# Patient Record
Sex: FEMALE | Race: WHITE | NOT HISPANIC OR LATINO | Employment: OTHER | URBAN - NONMETROPOLITAN AREA
[De-identification: names, ages, dates, MRNs, and addresses within clinical notes are randomized per-mention and may not be internally consistent; named-entity substitution may affect disease eponyms.]

---

## 2017-11-05 ENCOUNTER — APPOINTMENT (EMERGENCY)
Dept: CT IMAGING | Facility: HOSPITAL | Age: 62
DRG: 617 | End: 2017-11-05
Payer: MEDICARE

## 2017-11-05 ENCOUNTER — HOSPITAL ENCOUNTER (INPATIENT)
Facility: HOSPITAL | Age: 62
LOS: 4 days | Discharge: HOME | DRG: 617 | End: 2017-11-09
Attending: EMERGENCY MEDICINE | Admitting: INTERNAL MEDICINE
Payer: MEDICARE

## 2017-11-05 DIAGNOSIS — E55.9 VITAMIN D DEFICIENCY: ICD-10-CM

## 2017-11-05 DIAGNOSIS — E83.39 HYPERPHOSPHATEMIA: ICD-10-CM

## 2017-11-05 DIAGNOSIS — E83.42 HYPOMAGNESEMIA: ICD-10-CM

## 2017-11-05 DIAGNOSIS — E11.9 DIABETES (HCC): ICD-10-CM

## 2017-11-05 DIAGNOSIS — E11.65 TYPE 2 DIABETES MELLITUS WITH HYPERGLYCEMIA, WITHOUT LONG-TERM CURRENT USE OF INSULIN (HCC): ICD-10-CM

## 2017-11-05 DIAGNOSIS — R74.8 ELEVATED ALKALINE PHOSPHATASE LEVEL: ICD-10-CM

## 2017-11-05 DIAGNOSIS — R59.0 INGUINAL LYMPHADENOPATHY: ICD-10-CM

## 2017-11-05 DIAGNOSIS — E83.51 HYPOCALCEMIA: ICD-10-CM

## 2017-11-05 DIAGNOSIS — E87.1 HYPONATREMIA: ICD-10-CM

## 2017-11-05 DIAGNOSIS — L97.519: ICD-10-CM

## 2017-11-05 DIAGNOSIS — L03.031 CELLULITIS OF THIRD TOE, RIGHT: ICD-10-CM

## 2017-11-05 DIAGNOSIS — M86.9 OSTEOMYELITIS (HCC): Primary | ICD-10-CM

## 2017-11-05 LAB
ALBUMIN SERPL BCP-MCNC: 3.6 G/DL (ref 3.5–5)
ALP SERPL-CCNC: 134 U/L (ref 46–116)
ALT SERPL W P-5'-P-CCNC: 23 U/L (ref 12–78)
ANION GAP SERPL CALCULATED.3IONS-SCNC: 9 MMOL/L (ref 4–13)
APTT PPP: 27 SECONDS (ref 23–35)
AST SERPL W P-5'-P-CCNC: 16 U/L (ref 5–45)
BACTERIA UR QL AUTO: ABNORMAL /HPF
BASOPHILS # BLD AUTO: 0.06 THOUSANDS/ΜL (ref 0–0.1)
BASOPHILS NFR BLD AUTO: 1 % (ref 0–1)
BILIRUB SERPL-MCNC: 0.5 MG/DL (ref 0.2–1)
BILIRUB UR QL STRIP: NEGATIVE
BUN SERPL-MCNC: 16 MG/DL (ref 5–25)
CALCIUM SERPL-MCNC: 9.5 MG/DL (ref 8.3–10.1)
CHLORIDE SERPL-SCNC: 98 MMOL/L (ref 100–108)
CLARITY UR: CLEAR
CO2 SERPL-SCNC: 28 MMOL/L (ref 21–32)
COLOR UR: YELLOW
CREAT SERPL-MCNC: 0.9 MG/DL (ref 0.6–1.3)
EOSINOPHIL # BLD AUTO: 0.09 THOUSAND/ΜL (ref 0–0.61)
EOSINOPHIL NFR BLD AUTO: 1 % (ref 0–6)
ERYTHROCYTE [DISTWIDTH] IN BLOOD BY AUTOMATED COUNT: 13.4 % (ref 11.6–15.1)
ERYTHROCYTE [SEDIMENTATION RATE] IN BLOOD: 56 MM/HOUR (ref 0–20)
GFR SERPL CREATININE-BSD FRML MDRD: 69 ML/MIN/1.73SQ M
GLUCOSE SERPL-MCNC: 292 MG/DL (ref 65–140)
GLUCOSE UR STRIP-MCNC: ABNORMAL MG/DL
HCT VFR BLD AUTO: 38.7 % (ref 34.8–46.1)
HGB BLD-MCNC: 13.2 G/DL (ref 11.5–15.4)
HGB UR QL STRIP.AUTO: ABNORMAL
INR PPP: 0.99 (ref 0.86–1.16)
KETONES UR STRIP-MCNC: ABNORMAL MG/DL
LACTATE SERPL-SCNC: 1.3 MMOL/L (ref 0.5–2)
LACTATE SERPL-SCNC: 1.3 MMOL/L (ref 0.5–2)
LEUKOCYTE ESTERASE UR QL STRIP: ABNORMAL
LYMPHOCYTES # BLD AUTO: 2.24 THOUSANDS/ΜL (ref 0.6–4.47)
LYMPHOCYTES NFR BLD AUTO: 20 % (ref 14–44)
MCH RBC QN AUTO: 28.9 PG (ref 26.8–34.3)
MCHC RBC AUTO-ENTMCNC: 34.1 G/DL (ref 31.4–37.4)
MCV RBC AUTO: 85 FL (ref 82–98)
MONOCYTES # BLD AUTO: 0.71 THOUSAND/ΜL (ref 0.17–1.22)
MONOCYTES NFR BLD AUTO: 6 % (ref 4–12)
NEUTROPHILS # BLD AUTO: 7.96 THOUSANDS/ΜL (ref 1.85–7.62)
NEUTS SEG NFR BLD AUTO: 72 % (ref 43–75)
NITRITE UR QL STRIP: NEGATIVE
NON-SQ EPI CELLS URNS QL MICRO: ABNORMAL /HPF
PH UR STRIP.AUTO: 5.5 [PH] (ref 4.5–8)
PLATELET # BLD AUTO: 295 THOUSANDS/UL (ref 149–390)
PMV BLD AUTO: 9.6 FL (ref 8.9–12.7)
POTASSIUM SERPL-SCNC: 3.8 MMOL/L (ref 3.5–5.3)
PROT SERPL-MCNC: 8.2 G/DL (ref 6.4–8.2)
PROT UR STRIP-MCNC: NEGATIVE MG/DL
PROTHROMBIN TIME: 12.9 SECONDS (ref 12.1–14.4)
RBC # BLD AUTO: 4.57 MILLION/UL (ref 3.81–5.12)
RBC #/AREA URNS AUTO: ABNORMAL /HPF
SODIUM SERPL-SCNC: 135 MMOL/L (ref 136–145)
SP GR UR STRIP.AUTO: 1.02 (ref 1–1.03)
TROPONIN I SERPL-MCNC: <0.02 NG/ML
UROBILINOGEN UR QL STRIP.AUTO: 0.2 E.U./DL
WBC # BLD AUTO: 11.06 THOUSAND/UL (ref 4.31–10.16)
WBC #/AREA URNS AUTO: ABNORMAL /HPF

## 2017-11-05 PROCEDURE — 99285 EMERGENCY DEPT VISIT HI MDM: CPT

## 2017-11-05 PROCEDURE — 80053 COMPREHEN METABOLIC PANEL: CPT | Performed by: EMERGENCY MEDICINE

## 2017-11-05 PROCEDURE — 73700 CT LOWER EXTREMITY W/O DYE: CPT

## 2017-11-05 PROCEDURE — 83605 ASSAY OF LACTIC ACID: CPT | Performed by: EMERGENCY MEDICINE

## 2017-11-05 PROCEDURE — 36415 COLL VENOUS BLD VENIPUNCTURE: CPT | Performed by: EMERGENCY MEDICINE

## 2017-11-05 PROCEDURE — 85652 RBC SED RATE AUTOMATED: CPT | Performed by: EMERGENCY MEDICINE

## 2017-11-05 PROCEDURE — 85730 THROMBOPLASTIN TIME PARTIAL: CPT | Performed by: EMERGENCY MEDICINE

## 2017-11-05 PROCEDURE — 85025 COMPLETE CBC W/AUTO DIFF WBC: CPT | Performed by: EMERGENCY MEDICINE

## 2017-11-05 PROCEDURE — 86140 C-REACTIVE PROTEIN: CPT | Performed by: EMERGENCY MEDICINE

## 2017-11-05 PROCEDURE — 85610 PROTHROMBIN TIME: CPT | Performed by: EMERGENCY MEDICINE

## 2017-11-05 PROCEDURE — 87040 BLOOD CULTURE FOR BACTERIA: CPT | Performed by: EMERGENCY MEDICINE

## 2017-11-05 PROCEDURE — 93005 ELECTROCARDIOGRAM TRACING: CPT | Performed by: EMERGENCY MEDICINE

## 2017-11-05 PROCEDURE — 84484 ASSAY OF TROPONIN QUANT: CPT | Performed by: EMERGENCY MEDICINE

## 2017-11-05 PROCEDURE — 81001 URINALYSIS AUTO W/SCOPE: CPT | Performed by: EMERGENCY MEDICINE

## 2017-11-05 PROCEDURE — 87081 CULTURE SCREEN ONLY: CPT | Performed by: INTERNAL MEDICINE

## 2017-11-05 RX ADMIN — CEFEPIME HYDROCHLORIDE 2000 MG: 2 INJECTION, SOLUTION INTRAVENOUS at 19:41

## 2017-11-05 RX ADMIN — SODIUM CHLORIDE 1000 ML: 0.9 INJECTION, SOLUTION INTRAVENOUS at 19:36

## 2017-11-05 RX ADMIN — VANCOMYCIN HYDROCHLORIDE 1750 MG: 1 INJECTION, POWDER, LYOPHILIZED, FOR SOLUTION INTRAVENOUS at 20:47

## 2017-11-05 NOTE — ED PROVIDER NOTES
History  Chief Complaint   Patient presents with    Foot Swelling     Right foot swelling for two weeks  States many objects that have fallen on her foot  The patient is a pleasant 58-year-old male that reports to the emergency department with redness and swelling to the right middle toe the past 2 weeks  She notes that she clips her toes and that she cut her toe about 2 weeks ago  She notes subjective fevers and chills  No vomiting or diarrhea  No crepitus  No lightheadedness or dizziness  No chest pain or shortness of breath  The medical decision making: This is a 58-year-old female concern for possible osteomyelitis, will workup, will perform septic workup as well  Prior to Admission Medications   Prescriptions Last Dose Informant Patient Reported? Taking? HYDROcodone-acetaminophen (VICODIN) 7 5-500 MG per tablet   Yes Yes   Sig: Take 1 tablet by mouth every 6 (six) hours as needed for moderate pain      Facility-Administered Medications: None       Past Medical History:   Diagnosis Date    Diabetes mellitus ([facility] [address] )        Past Surgical History:   Procedure Laterality Date    APPENDECTOMY      BREAST LUMPECTOMY Left     CHOLECYSTECTOMY         History reviewed  No pertinent family history  I have reviewed and agree with the history as documented  Social History   Substance Use Topics    Smoking status: Never Smoker    Smokeless tobacco: Never Used    Alcohol use No        Review of Systems   Constitutional:        Subjective fevers and chills   HENT: Negative for ear discharge and facial swelling  Respiratory: Negative for chest tightness, shortness of breath and wheezing  Cardiovascular: Negative for chest pain and palpitations  Gastrointestinal: Negative for abdominal pain, diarrhea and vomiting  Genitourinary: Negative for dysuria and hematuria  Musculoskeletal: Negative for arthralgias and neck stiffness     Skin: Positive for color change and wound  Negative for rash  Allergic/Immunologic: Negative  Neurological: Negative for tremors, seizures and syncope  Psychiatric/Behavioral: Negative for hallucinations and suicidal ideas  All other systems reviewed and are negative  Physical Exam  ED Triage Vitals [11/05/17 1731]   Temperature Pulse Respirations Blood Pressure SpO2   100 3 °F (37 9 °C) (!) 107 17 (!) 224/112 98 %      Temp Source Heart Rate Source Patient Position - Orthostatic VS BP Location FiO2 (%)   Temporal Monitor Lying Right arm --      Pain Score       5           Orthostatic Vital Signs  Vitals:    11/07/17 2342 11/07/17 2352 11/08/17 0718 11/08/17 0946   BP: (!) 180/81 (!) 172/78 (!) 191/102 160/88   Pulse: 72  (!) 114    Patient Position - Orthostatic VS: Lying  Lying Lying       Physical Exam   Constitutional: She is oriented to person, place, and time  She appears well-developed and well-nourished  HENT:   Head: Normocephalic and atraumatic  Right Ear: External ear normal    Left Ear: External ear normal    Eyes: Conjunctivae and EOM are normal    Neck: Normal range of motion  Neck supple  No JVD present  No tracheal deviation present  Cardiovascular: Normal rate, regular rhythm and normal heart sounds  Pulmonary/Chest: Effort normal  No respiratory distress  She has no wheezes  She has no rales  Abdominal: Soft  Bowel sounds are normal  There is no tenderness  There is no rebound and no guarding  Musculoskeletal: She exhibits no edema or tenderness  Neurological: She is alert and oriented to person, place, and time  Skin: Skin is warm and dry  No rash noted  There is erythema  Psychiatric: She has a normal mood and affect  Thought content normal    Nursing note and vitals reviewed        ED Medications  Medications   sodium chloride 0 9 % infusion (100 mL/hr Intravenous New Bag 11/8/17 4509)   insulin lispro (HumaLOG) 100 units/mL subcutaneous injection 2-12 Units ( Subcutaneous MAR Unhold 11/8/17 1960) insulin lispro (HumaLOG) 100 units/mL subcutaneous injection 1-6 Units ( Subcutaneous MAR Unhold 11/8/17 1359)   acetaminophen (TYLENOL) tablet 650 mg ( Oral MAR Unhold 11/8/17 1359)   oxyCODONE (ROXICODONE) IR tablet 5 mg ( Oral MAR Unhold 11/8/17 1359)   HYDROmorphone (DILAUDID) 1 mg/mL injection 0 5 mg ( Intravenous MAR Unhold 11/8/17 1359)   ondansetron (ZOFRAN) injection 4 mg ( Intravenous MAR Unhold 11/8/17 1359)   cefepime (MAXIPIME) 2,000 mg in dextrose 5 % 50 mL IVPB ( Intravenous MAR Unhold 11/8/17 1359)   vancomycin (VANCOCIN) 1,500 mg in sodium chloride 0 9 % 500 mL IVPB ( Intravenous MAR Unhold 11/8/17 1359)   insulin aspart protamine-insulin aspart (NovoLOG 70/30) 100 units/mL subcutaneous injection 15 Units ( Subcutaneous MAR Unhold 11/8/17 1359)   ergocalciferol (VITAMIN D2) capsule 50,000 Units ( Oral MAR Unhold 11/8/17 1359)   lisinopril (ZESTRIL) tablet 10 mg ( Oral MAR Unhold 11/8/17 1359)   labetalol (NORMODYNE) injection 10 mg ( Intravenous MAR Unhold 11/8/17 1359)   bupivacaine (MARCAINE) 0 5 % injection (15 mL  Given 11/8/17 1415)   sodium chloride 0 9 % bolus 1,000 mL (0 mL Intravenous Stopped 11/5/17 2036)   vancomycin (VANCOCIN) 1,750 mg in sodium chloride 0 9 % 500 mL IVPB (1,750 mg Intravenous New Bag 11/5/17 2047)   cefepime (MAXIPIME) IVPB (premix) 2,000 mg (0 mg Intravenous Stopped 11/5/17 2011)   labetalol (NORMODYNE) injection 10 mg (10 mg Intravenous Given 11/7/17 1328)       Diagnostic Studies  Results Reviewed     Procedure Component Value Units Date/Time    Blood culture #1 [[id number]] Collected:  11/05/17 1936    Lab Status:  Preliminary result Specimen:  Blood from Arm, Right Updated:  11/08/17 0801     Blood Culture No Growth at 48 hrs  Blood culture #2 [[id number]] Collected:  11/05/17 1932    Lab Status:  Preliminary result Specimen:  Blood from Hand, Right Updated:  11/08/17 0801     Blood Culture No Growth at 48 hrs      MRSA culture [[id number]]  (Normal) Collected: 11/05/17 2029    Lab Status:  Final result Specimen:  Nares from Nose Updated:  11/07/17 1452     MRSA Culture Only No Methicillin Resistant Staphlyococcus aureus (MRSA) isolated    C-reactive protein [[id number]]  (Abnormal) Collected:  11/05/17 1936    Lab Status:  Final result Specimen:  Blood from Arm, Right Updated:  11/06/17 0828     CRP 67 7 (H) mg/L     Lactic acid x2 Q2H [[id number]]  (Normal) Collected:  11/05/17 2206    Lab Status:  Final result Specimen:  Blood Updated:  11/05/17 2222     LACTIC ACID 1 3 mmol/L     Narrative:         Result may be elevated if tourniquet was used during collection  Sedimentation rate, automated [[id number]]  (Abnormal) Collected:  11/05/17 1936    Lab Status:  Final result Specimen:  Blood from Arm, Right Updated:  11/05/17 2054     Sed Rate 56 (H) mm/hour     Protime-INR [[id number]]  (Normal) Collected:  11/05/17 1936    Lab Status:  Final result Specimen:  Blood from Arm, Right Updated:  11/05/17 2021     Protime 12 9 seconds      INR 0 99    APTT [[id number]]  (Normal) Collected:  11/05/17 1936    Lab Status:  Final result Specimen:  Blood from Arm, Right Updated:  11/05/17 2021     PTT 27 seconds     Narrative: Therapeutic Heparin Range = 60-90 seconds    Troponin I [[id number]]  (Normal) Collected:  11/05/17 1936    Lab Status:  Final result Specimen:  Blood from Arm, Right Updated:  11/05/17 2010     Troponin I <0 02 ng/mL     Narrative:         [employer] Chemistry analyzer 99% cutoff is > 0 04 ng/mL in network labs    o cTnI 99% cutoff is useful only when applied to patients in the clinical setting of myocardial ischemia  o cTnI 99% cutoff should be interpreted in the context of clinical history, ECG findings and possibly cardiac imaging to establish correct diagnosis  o cTnI 99% cutoff may be suggestive but clearly not indicative of a coronary event without the clinical setting of myocardial ischemia      Lactic acid x2 Q2H [[id number]]  (Normal) Collected:  11/05/17 1936    Lab Status:  Final result Specimen:  Blood from Arm, Right Updated:  11/05/17 2010     LACTIC ACID 1 3 mmol/L     Narrative:         Result may be elevated if tourniquet was used during collection  Comprehensive metabolic panel [[id number]]  (Abnormal) Collected:  11/05/17 1936    Lab Status:  Final result Specimen:  Blood from Arm, Right Updated:  11/05/17 2007     Sodium 135 (L) mmol/L      Potassium 3 8 mmol/L      Chloride 98 (L) mmol/L      CO2 28 mmol/L      Anion Gap 9 mmol/L      BUN 16 mg/dL      Creatinine 0 90 mg/dL      Glucose 292 (H) mg/dL      Calcium 9 5 mg/dL      AST 16 U/L      ALT 23 U/L      Alkaline Phosphatase 134 (H) U/L      Total Protein 8 2 g/dL      Albumin 3 6 g/dL      Total Bilirubin 0 50 mg/dL      eGFR 69 ml/min/1 73sq m     Narrative:         National Kidney Disease Education Program recommendations are as follows:  GFR calculation is accurate only with a steady state creatinine  Chronic Kidney disease less than 60 ml/min/1 73 sq  meters  Kidney failure less than 15 ml/min/1 73 sq  meters  Urine Microscopic [[id number]]  (Abnormal) Collected:  11/05/17 1942    Lab Status:  Final result Specimen:  Urine from Urine, Clean Catch Updated:  11/05/17 1959     RBC, UA 0-1 (A) /hpf      WBC, UA 0-1 (A) /hpf      Epithelial Cells Occasional /hpf      Bacteria, UA None Seen /hpf     UA w Reflex to Microscopic w Reflex to Culture [[id number]]  (Abnormal) Collected:  11/05/17 1942    Lab Status:  Final result Specimen:  Urine from Urine, Clean Catch Updated:  11/05/17 1951     Color, UA Yellow     Clarity, UA Clear     Specific [place], UA 1 020     pH, UA 5 5     Leukocytes, UA Elevated glucose may cause decreased leukocyte values   See urine microscopic for [facility] result/ (A)     Nitrite, UA Negative     Protein, UA Negative mg/dl      Glucose, UA >=1000 (1%) (A) mg/dl      Ketones, UA Trace (A) mg/dl      Urobilinogen, UA 0 2 E U /dl      Bilirubin, UA Negative     Blood, UA Trace-lysed (A)    CBC and differential [[id number]]  (Abnormal) Collected:  11/05/17 1936    Lab Status:  Final result Specimen:  Blood from Arm, Right Updated:  11/05/17 1950     WBC 11 06 (H) Thousand/uL      RBC 4 57 Million/uL      Hemoglobin 13 2 g/dL      Hematocrit 38 7 %      MCV 85 fL      MCH 28 9 pg      MCHC 34 1 g/dL      RDW 13 4 %      MPV 9 6 fL      Platelets 810 Thousands/uL      Neutrophils Relative 72 %      Lymphocytes Relative 20 %      Monocytes Relative 6 %      Eosinophils Relative 1 %      Basophils Relative 1 %      Neutrophils Absolute 7 96 (H) Thousands/µL      Lymphocytes Absolute 2 24 Thousands/µL      Monocytes Absolute 0 71 Thousand/µL      Eosinophils Absolute 0 09 Thousand/µL      Basophils Absolute 0 06 Thousands/µL                  NM radrx ceretec abscess localization limited   Final Result by Lloyd Valencia MD (11/08 1843)   1  Findings suspicious for infection and underlying osteomyelitis of the 3rd toe distal phalanx  ##sigslh##sigslh         Workstation performed: [id number]         VAS lower limb venous duplex study, complete bilateral   Final Result by Jackson Martinez DO (11/07 1849)      CT foot right wo contrast   Final Result by Vikram Garcia MD (11/05 1028)      There is an ulcer of the very distal aspect of the 3rd toe which approaches but does not appear to contact the distal aspect of the 3rd distal phalanx  Mild irregularity of the underlying 3rd distal phalanx may indicate osteomyelitis  Consider MRI    follow-up           ##imslh##imslh         Workstation performed: [id number]                    Procedures  Procedures       Phone Contacts  ED Phone Contact    ED Course  ED Course as of Nov 08 1434   Sun Nov 05, 2017   1901 SO- rule out osteo, admit to slim with abx, pending infectious workup                                MDM  CritCare Time    Disposition  Final diagnoses:   Osteomyelitis ([facility] [address] )   Diabetes ([facility] [address] )     Time reflects when diagnosis was documented in both MDM as applicable and the Disposition within this note     Time User Action Codes Description Comment    11/5/2017  7:30 PM Brandon Farr Add [M86 9] Osteomyelitis ([facility] )     11/5/2017  7:30 PM Brandon Farr Add [E11 9] Diabetes ([place] [address] )     11/6/2017  1:55 AM Holger Rodgers Add [L51 165] Cellulitis of third toe, right     11/6/2017  1:55 AM Holger Rodgers Add [L97 519] Skin ulcer of third toe of right foot ([place] [address] )     11/7/2017 10:14 AM Ursula Padilla Add [E11 65] Type 2 diabetes mellitus with hyperglycemia, without long-term current use of insulin [facility])       ED Disposition     ED Disposition Condition Comment    Admit  Case was discussed with Dr Akanksha Gallardo    None       Current Discharge Medication List      CONTINUE these medications which have NOT CHANGED    Details   HYDROcodone-acetaminophen (VICODIN) 7 5-500 MG per tablet Take 1 tablet by mouth every 6 (six) hours as needed for moderate pain           No discharge procedures on file      ED Provider  Electronically Signed by           Lux Ramsey MD  11/08/17 1718

## 2017-11-06 ENCOUNTER — APPOINTMENT (INPATIENT)
Dept: MRI IMAGING | Facility: HOSPITAL | Age: 62
DRG: 617 | End: 2017-11-06
Payer: MEDICARE

## 2017-11-06 PROBLEM — R74.8 ELEVATED ALKALINE PHOSPHATASE LEVEL: Status: ACTIVE | Noted: 2017-11-06

## 2017-11-06 PROBLEM — L97.519: Status: ACTIVE | Noted: 2017-11-06

## 2017-11-06 PROBLEM — E87.1 HYPONATREMIA: Status: ACTIVE | Noted: 2017-11-06

## 2017-11-06 PROBLEM — R70.0 ELEVATED SED RATE: Status: ACTIVE | Noted: 2017-11-06

## 2017-11-06 PROBLEM — E11.65 TYPE 2 DIABETES MELLITUS WITH HYPERGLYCEMIA (HCC): Status: ACTIVE | Noted: 2017-11-06

## 2017-11-06 PROBLEM — L03.031 CELLULITIS OF THIRD TOE, RIGHT: Status: ACTIVE | Noted: 2017-11-06

## 2017-11-06 LAB
25(OH)D3 SERPL-MCNC: 9.7 NG/ML (ref 30–100)
ALBUMIN SERPL BCP-MCNC: 3.1 G/DL (ref 3.5–5)
ALP SERPL-CCNC: 119 U/L (ref 46–116)
ALT SERPL W P-5'-P-CCNC: 19 U/L (ref 12–78)
ANION GAP SERPL CALCULATED.3IONS-SCNC: 7 MMOL/L (ref 4–13)
AST SERPL W P-5'-P-CCNC: 9 U/L (ref 5–45)
ATRIAL RATE: 91 BPM
BASOPHILS # BLD AUTO: 0.06 THOUSANDS/ΜL (ref 0–0.1)
BASOPHILS NFR BLD AUTO: 1 % (ref 0–1)
BILIRUB SERPL-MCNC: 0.7 MG/DL (ref 0.2–1)
BUN SERPL-MCNC: 11 MG/DL (ref 5–25)
CALCIUM SERPL-MCNC: 8.5 MG/DL (ref 8.3–10.1)
CHLORIDE SERPL-SCNC: 100 MMOL/L (ref 100–108)
CK SERPL-CCNC: 63 U/L (ref 26–192)
CO2 SERPL-SCNC: 29 MMOL/L (ref 21–32)
CREAT SERPL-MCNC: 0.73 MG/DL (ref 0.6–1.3)
CRP SERPL QL: 67.7 MG/L
EOSINOPHIL # BLD AUTO: 0.12 THOUSAND/ΜL (ref 0–0.61)
EOSINOPHIL NFR BLD AUTO: 1 % (ref 0–6)
ERYTHROCYTE [DISTWIDTH] IN BLOOD BY AUTOMATED COUNT: 13.4 % (ref 11.6–15.1)
EST. AVERAGE GLUCOSE BLD GHB EST-MCNC: 298 MG/DL
GFR SERPL CREATININE-BSD FRML MDRD: 89 ML/MIN/1.73SQ M
GLUCOSE SERPL-MCNC: 177 MG/DL (ref 65–140)
GLUCOSE SERPL-MCNC: 232 MG/DL (ref 65–140)
GLUCOSE SERPL-MCNC: 241 MG/DL (ref 65–140)
GLUCOSE SERPL-MCNC: 245 MG/DL (ref 65–140)
GLUCOSE SERPL-MCNC: 248 MG/DL (ref 65–140)
GLUCOSE SERPL-MCNC: 318 MG/DL (ref 65–140)
HBA1C MFR BLD: 12 % (ref 4.2–6.3)
HCT VFR BLD AUTO: 34.9 % (ref 34.8–46.1)
HGB BLD-MCNC: 12 G/DL (ref 11.5–15.4)
LACTATE SERPL-SCNC: 1 MMOL/L (ref 0.5–2)
LYMPHOCYTES # BLD AUTO: 2.2 THOUSANDS/ΜL (ref 0.6–4.47)
LYMPHOCYTES NFR BLD AUTO: 25 % (ref 14–44)
MAGNESIUM SERPL-MCNC: 1.6 MG/DL (ref 1.6–2.6)
MCH RBC QN AUTO: 29.2 PG (ref 26.8–34.3)
MCHC RBC AUTO-ENTMCNC: 34.4 G/DL (ref 31.4–37.4)
MCV RBC AUTO: 85 FL (ref 82–98)
MONOCYTES # BLD AUTO: 0.49 THOUSAND/ΜL (ref 0.17–1.22)
MONOCYTES NFR BLD AUTO: 6 % (ref 4–12)
NEUTROPHILS # BLD AUTO: 5.97 THOUSANDS/ΜL (ref 1.85–7.62)
NEUTS SEG NFR BLD AUTO: 67 % (ref 43–75)
P AXIS: 7 DEGREES
PHOSPHATE SERPL-MCNC: 3.4 MG/DL (ref 2.3–4.1)
PLATELET # BLD AUTO: 269 THOUSANDS/UL (ref 149–390)
PMV BLD AUTO: 9.3 FL (ref 8.9–12.7)
POTASSIUM SERPL-SCNC: 3.6 MMOL/L (ref 3.5–5.3)
PR INTERVAL: 168 MS
PROT SERPL-MCNC: 7.4 G/DL (ref 6.4–8.2)
QRS AXIS: 2 DEGREES
QRSD INTERVAL: 90 MS
QT INTERVAL: 372 MS
QTC INTERVAL: 457 MS
RBC # BLD AUTO: 4.11 MILLION/UL (ref 3.81–5.12)
SODIUM SERPL-SCNC: 136 MMOL/L (ref 136–145)
T WAVE AXIS: 33 DEGREES
TROPONIN I SERPL-MCNC: <0.02 NG/ML
TROPONIN I SERPL-MCNC: <0.02 NG/ML
TSH SERPL DL<=0.05 MIU/L-ACNC: 1.15 UIU/ML (ref 0.36–3.74)
VENTRICULAR RATE: 91 BPM
WBC # BLD AUTO: 8.84 THOUSAND/UL (ref 4.31–10.16)

## 2017-11-06 PROCEDURE — 80053 COMPREHEN METABOLIC PANEL: CPT | Performed by: INTERNAL MEDICINE

## 2017-11-06 PROCEDURE — 83735 ASSAY OF MAGNESIUM: CPT | Performed by: INTERNAL MEDICINE

## 2017-11-06 PROCEDURE — 87205 SMEAR GRAM STAIN: CPT | Performed by: PODIATRIST

## 2017-11-06 PROCEDURE — 82948 REAGENT STRIP/BLOOD GLUCOSE: CPT

## 2017-11-06 PROCEDURE — 84443 ASSAY THYROID STIM HORMONE: CPT | Performed by: INTERNAL MEDICINE

## 2017-11-06 PROCEDURE — 85025 COMPLETE CBC W/AUTO DIFF WBC: CPT | Performed by: INTERNAL MEDICINE

## 2017-11-06 PROCEDURE — 83036 HEMOGLOBIN GLYCOSYLATED A1C: CPT | Performed by: INTERNAL MEDICINE

## 2017-11-06 PROCEDURE — G8979 MOBILITY GOAL STATUS: HCPCS | Performed by: PHYSICAL THERAPIST

## 2017-11-06 PROCEDURE — G8978 MOBILITY CURRENT STATUS: HCPCS | Performed by: PHYSICAL THERAPIST

## 2017-11-06 PROCEDURE — 87070 CULTURE OTHR SPECIMN AEROBIC: CPT | Performed by: PODIATRIST

## 2017-11-06 PROCEDURE — 87147 CULTURE TYPE IMMUNOLOGIC: CPT | Performed by: PODIATRIST

## 2017-11-06 PROCEDURE — 82306 VITAMIN D 25 HYDROXY: CPT | Performed by: INTERNAL MEDICINE

## 2017-11-06 PROCEDURE — 87086 URINE CULTURE/COLONY COUNT: CPT | Performed by: INTERNAL MEDICINE

## 2017-11-06 PROCEDURE — G8980 MOBILITY D/C STATUS: HCPCS | Performed by: PHYSICAL THERAPIST

## 2017-11-06 PROCEDURE — 97163 PT EVAL HIGH COMPLEX 45 MIN: CPT | Performed by: PHYSICAL THERAPIST

## 2017-11-06 PROCEDURE — 84100 ASSAY OF PHOSPHORUS: CPT | Performed by: INTERNAL MEDICINE

## 2017-11-06 PROCEDURE — 83605 ASSAY OF LACTIC ACID: CPT | Performed by: INTERNAL MEDICINE

## 2017-11-06 PROCEDURE — 82550 ASSAY OF CK (CPK): CPT | Performed by: INTERNAL MEDICINE

## 2017-11-06 PROCEDURE — 84484 ASSAY OF TROPONIN QUANT: CPT | Performed by: INTERNAL MEDICINE

## 2017-11-06 RX ORDER — ACETAMINOPHEN 325 MG/1
650 TABLET ORAL EVERY 6 HOURS PRN
Status: DISCONTINUED | OUTPATIENT
Start: 2017-11-06 | End: 2017-11-09 | Stop reason: HOSPADM

## 2017-11-06 RX ORDER — SODIUM CHLORIDE 9 MG/ML
100 INJECTION, SOLUTION INTRAVENOUS CONTINUOUS
Status: DISCONTINUED | OUTPATIENT
Start: 2017-11-06 | End: 2017-11-09

## 2017-11-06 RX ORDER — OXYCODONE HYDROCHLORIDE 5 MG/1
5 TABLET ORAL EVERY 4 HOURS PRN
Status: DISCONTINUED | OUTPATIENT
Start: 2017-11-06 | End: 2017-11-09 | Stop reason: HOSPADM

## 2017-11-06 RX ORDER — ONDANSETRON 2 MG/ML
4 INJECTION INTRAMUSCULAR; INTRAVENOUS EVERY 6 HOURS PRN
Status: DISCONTINUED | OUTPATIENT
Start: 2017-11-06 | End: 2017-11-09 | Stop reason: HOSPADM

## 2017-11-06 RX ORDER — CEFEPIME HYDROCHLORIDE 2 G/1
2000 INJECTION, POWDER, FOR SOLUTION INTRAVENOUS EVERY 12 HOURS
Status: DISCONTINUED | OUTPATIENT
Start: 2017-11-06 | End: 2017-11-06

## 2017-11-06 RX ADMIN — INSULIN LISPRO 3 UNITS: 100 INJECTION, SOLUTION INTRAVENOUS; SUBCUTANEOUS at 23:06

## 2017-11-06 RX ADMIN — VANCOMYCIN HYDROCHLORIDE 1500 MG: 5 INJECTION, POWDER, LYOPHILIZED, FOR SOLUTION INTRAVENOUS at 23:06

## 2017-11-06 RX ADMIN — ENOXAPARIN SODIUM 40 MG: 40 INJECTION SUBCUTANEOUS at 03:02

## 2017-11-06 RX ADMIN — SODIUM CHLORIDE 100 ML/HR: 0.9 INJECTION, SOLUTION INTRAVENOUS at 17:42

## 2017-11-06 RX ADMIN — CEFEPIME 2000 MG: 2 INJECTION, POWDER, FOR SOLUTION INTRAMUSCULAR; INTRAVENOUS at 09:52

## 2017-11-06 RX ADMIN — CEFEPIME 2000 MG: 2 INJECTION, POWDER, FOR SOLUTION INTRAMUSCULAR; INTRAVENOUS at 19:41

## 2017-11-06 RX ADMIN — SODIUM CHLORIDE 100 ML/HR: 0.9 INJECTION, SOLUTION INTRAVENOUS at 03:03

## 2017-11-06 RX ADMIN — INSULIN LISPRO 8 UNITS: 100 INJECTION, SOLUTION INTRAVENOUS; SUBCUTANEOUS at 09:20

## 2017-11-06 RX ADMIN — INSULIN LISPRO 4 UNITS: 100 INJECTION, SOLUTION INTRAVENOUS; SUBCUTANEOUS at 11:44

## 2017-11-06 RX ADMIN — INSULIN LISPRO 2 UNITS: 100 INJECTION, SOLUTION INTRAVENOUS; SUBCUTANEOUS at 16:50

## 2017-11-06 RX ADMIN — VANCOMYCIN HYDROCHLORIDE 1500 MG: 5 INJECTION, POWDER, LYOPHILIZED, FOR SOLUTION INTRAVENOUS at 11:47

## 2017-11-06 NOTE — PHYSICAL THERAPY NOTE
PT Evaluation     11/06/17 0951   Note Type   Note type Eval only   Pain Assessment   Pain Assessment No/denies pain   Pain Score No Pain   Home Living   Type of Home House   Home Layout Multi-level;Bed/bath upstairs;1/2 bath on main level; Able to live on main level with bedroom/bathroom;Stairs to enter without rails  (sleeps on couch, 2 BRANDEE no HR, 12-13 steps to 2nd w/ HR)   Home Equipment Cane   Prior Function   Level of [place] Independent with ADLs and functional mobility   Lives With Alone   ADL Assistance Independent   IADLs Independent   Comments family or friends drive   Restrictions/Precautions   Other Precautions Contact/isolation   General   Family/Caregiver Present No   Cognition   Arousal/Participation Alert   Orientation Level Oriented X4   Following Commands Follows all commands and directions without difficulty   RLE Assessment   RLE Assessment WFL  (5/5)   LLE Assessment   LLE Assessment WFL  (5/5)   Coordination   Sensation WFL   Light Touch   RLE Light Touch Grossly intact   LLE Light Touch Grossly intact   Bed Mobility   Supine to Sit 7  Independent   Sit to Supine 7  Independent   Transfers   Sit to Stand 7  Independent   Stand to Sit 7  Independent   Stand pivot 7  Independent   Additional Comments normal gait pattern, no LOB or instability   Ambulation/Elevation   Gait pattern WNL   Gait Assistance 7  Independent   Assistive Device None   Distance 50ft no A D  (I)   Balance   Static Sitting Good   Dynamic Sitting Good   Static Standing Good   Dynamic Standing Good   Ambulatory Good   Endurance Deficit   Endurance Deficit No   Activity Tolerance   Activity Tolerance Patient tolerated treatment well   Assessment   Prognosis Good   Assessment Pt is a 58year old female presenting at an Independent level  Pt is independently ambulating in room ad windy  Pt with no functional deficits and is not currently in need of skilled PT  Will d/c PT     Goals   Patient Goals To resolve foot ulcer/pain and return home   Plan   Treatment/Interventions Functional transfer training; Endurance training;Patient/family training;Bed mobility;Gait training   PT Frequency One time visit   Recommendation   Recommendation Home independently   PT - OK to Discharge Yes   refused SCD's  Pt supine in bed with call bell in reach

## 2017-11-06 NOTE — PROGRESS NOTES
Patient went down for MRI of the foot this am  Patient refused medication for claustrophobia & refused  MRI once patient arrived for test  Dr Leigh aware  Patient blood glucose elevated; patient refused medication for night shift Rn; Patient educated on the benefit of insulin r/t her disease process; patient did agree to take medication

## 2017-11-06 NOTE — CONSULTS
Consultation - [id number] [facility] 58 y o  female MRN: [id number]  Unit/Bed#: 409-01 Encounter: [id number]    Assessment/Plan     Assessment:  1  Diabetic ulcer right 3rd toe, full thickness, with surrounding cellulitis  2  Eval for osteomyelitis to distal phalanx RT 3rd toe  Plan:  Continue IV antibiotics, took wound culture today but minimal drainage for a good culture  CT scan inconclusive, and patient could not tolerate MRI  Ceretec bone scan to eval for osteomyelitis of distal phalanx of right 3rd toe  If positive, will need distal phalangectomy for surgical cure prior to discharge  Will follow bone scan results  History of Present Illness   HPI:  Mo Caraballo is a 58 y o  female who presents with a wound located at right 3rd toe  The wound has been present for over  1week(s)  The wound is secondary to patient tearing a callous off the toe over a week ago  Present to ER yesterday due to increasing swelling to right  foot and erythema to right 3rd toe  Inpatient consult to Podiatry  Consult performed by: Rolando Wilcox ordered by: Spike Pain          Review of Systems    Historical Information   Past Medical History:   Diagnosis Date    Diabetes mellitus ([facility] [address] )      Past Surgical History:   Procedure Laterality Date    APPENDECTOMY      BREAST LUMPECTOMY Left     CHOLECYSTECTOMY       Social History   History   Alcohol Use No     History   Drug Use No     History   Smoking Status    Never Smoker   Smokeless Tobacco    Never Used     Family History: non-contributory    Meds/Allergies   all current active meds have been reviewed  Allergies   Allergen Reactions    Penicillin Potassium-G [Penicillin G]     Sulfa Antibiotics        Objective   Vitals: Blood pressure (!) 180/88, pulse 88, temperature 97 9 °F (36 6 °C), temperature source Temporal, resp  rate 16, height 5' 8" (1 727 m), weight 116 kg (256 lb 9 9 oz), SpO2 95 %       Wounds:     Other Ulcers 11/05/17 Toe (Comment which one) Right red with yellow edges  (Active)   Wound Description Intact;Edema;Fragile 11/6/2017  1:00 AM   Laurie-wound Assessment Fragile;Erythema 11/6/2017  1:00 AM   Shape round 11/6/2017  1:00 AM   Drainage Amount None 11/6/2017  1:00 AM   Treatment Elevated with pillow(s); Offload 11/6/2017  1:00 AM   Dressing Status Open to air 11/6/2017  1:00 AM         Physical Exam     Derm:  Dry eschar to tip of right 3rd toe without active drainage  Erythema to to, no gangrenous changes noted  Vascular:  Pedal pulses palpable bilaterally  Neuro:  Protective sensation absent to toes bilaterally  Ortho:  Hammertoe deformity right 3rd toe contributing to pressure on tip of digit    Lab Results: I have personally reviewed pertinent reports  Imaging: I have personally reviewed pertinent reports  EKG, Pathology, and Other Studies: I have personally reviewed pertinent reports  Code Status: Level 1 - Full Code  Advance Directive and Living Will:      Power of :    POLST:      Counseling / Coordination of Care  Total floor / unit time spent today 45 minutes  Greater than 50% of total time was spent with the patient and / or family counseling and / or coordination of care  A description of the counseling / coordination of care: Need for close Podiatric follow-up post-op

## 2017-11-06 NOTE — SOCIAL WORK
Cm met with the patient to evaluate the patients prior function and living situation and any barriers to d/c and form a safe d/c plan  Cm also evaluated the patient for any services in the home or needs for services  Pt resides at home alone in a [place] in [facility] [place]  Has 2 BRANDEE then she has been staying on the 1st floor (has a bathroom on her 1st floor)  No services or DME  PCP is Cholo Zheng and pharmacy is [facility] in [facility] [place]  Pt doesn't drive and her family drives her when she has appointments  Pt plans home on dc with outpatient follow up  Cm will continue to follow and assist in dc planning

## 2017-11-06 NOTE — PROGRESS NOTES
Accucheck-241 & patient refusing insulin even after explanation  Dr Janeth Salinas made aware , along with SCD refusal & claustrophobia with MRIs

## 2017-11-06 NOTE — CASE MANAGEMENT
Initial Clinical Review    Admission: Date/Time/Statement: 11/5/17 @ 1931     Orders Placed This Encounter   Procedures    Inpatient Admission (expected length of stay for this patient is greater than two midnights)     Standing Status:   Standing     Number of Occurrences:   1     Order Specific Question:   Admitting Physician     Answer:   Simran Bey     Order Specific Question:   Level of Care     Answer:   Med Surg [16]     Order Specific Question:   Estimated length of stay     Answer:   More than 2 Midnights     Order Specific Question:   Certification     Answer:   I certify that inpatient services are medically necessary for this patient for a duration of greater than two midnights  See H&P and MD Progress Notes for additional information about the patient's course of treatment  ED: Date/Time/Mode of Arrival:   ED Arrival Information     Expected Arrival Acuity Means of Arrival Escorted By Service Admission Type    - 11/5/2017 17:23 Urgent Walk-In Self General Medicine Urgent    Arrival Complaint    foot swelling          Chief Complaint:   Chief Complaint   Patient presents with    Foot Swelling     Right foot swelling for two weeks  States many objects that have fallen on her foot  History of Illness: The patient is 58year-old female who presented to the emergency room with the complaints of an infection of her right 3rd toe  Approximately 1 week ago, the patient experienced minor trauma to the right 3rd toe  She then removed dead skin from the right 3rd toe  She developed an ulceration of the right 3rd toe  She also developed pain and swelling involving her right 3rd toe  Over the last 24-48 hours, she has been experiencing subjective fevers and chills  No chest pain  No shortness of breath  No abdominal pain  No nausea or vomiting  Nothing seemed to relieve her symptoms  Her right 3rd toe pain worsened with weight-bearing       ED Vital Signs:   ED Triage Vitals [11/05/17 1731]   Temperature Pulse Respirations Blood Pressure SpO2   100 3 °F (37 9 °C) (!) 107 17 (!) 224/112 98 %      Temp Source Heart Rate Source Patient Position - Orthostatic VS BP Location FiO2 (%)   Temporal Monitor Lying Right arm --      Pain Score       5        Wt Readings from Last 1 Encounters:   11/06/17 116 kg (256 lb 9 9 oz)       Vital Signs (abnormal):   Date and Time Temp Pulse SpO2 Resp BP Pain Score FACES Pain Rating User   11/05/17 2045 -- 89 97 % --  176/84 -- -- AF   11/05/17 1959 99 1 °F (37 3 °C) -- -- -- -- -- -- AF   11/05/17 1930 -- 91 100 % -- 167/83 -- -- AF   11/05/17 1815 -- 97 98 % --  189/82          Abnormal Labs/Diagnostic Test Results: CRP 67 7, Sed Rate 56, Na 135, Cl 98, Glucose 292, Alk Phos 134, WBC 11 06, Neutrophils Absolute 7 96    CT Right Foot: There is an ulcer of the very distal aspect of the 3rd toe which approaches but does not appear to contact the distal aspect of the 3rd distal phalanx   Mild irregularity of the underlying 3rd distal phalanx may indicate osteomyelitis       ED Treatment:   Medication Administration from 11/05/2017 1723 to 11/05/2017 2058       Date/Time Order Dose Route Action Action by Comments     11/05/2017 2036 sodium chloride 0 9 % bolus 1,000 mL 0 mL Intravenous Stopped Carola Goodman RN      11/05/2017 1936 sodium chloride 0 9 % bolus 1,000 mL 1,000 mL Intravenous [address] [facility] Mary Biba, [address] [place] [address]      11/05/2017 2047 vancomycin (VANCOCIN) 1,750 mg in sodium chloride 0 9 % 500 mL IVPB 1,750 mg Intravenous [facility] Mary Biba, [address] [place] [address]      11/05/2017 2011 cefepime (MAXIPIME) IVPB (premix) 2,000 mg 0 mg Intravenous Stopped Carola Goodman RN      11/05/2017 1941 cefepime (MAXIPIME) IVPB (premix) 2,000 mg 2,000 mg Intravenous [address] , RN         Physical Exam   General:  NAD, awake, alert, oriented x 3  CV:  + S1, +S2, Tachycardic at times, Regular rhythm  Pulm:  Lung fields are CTA bilaterally  Derm:  Dry eschar to tip of right 3rd toe without active drainage  Erythema to to, no gangrenous changes noted  Vascular:  Pedal pulses palpable bilaterally  Neuro:  Protective sensation absent to toes bilaterally  Ortho:  Hammertoe deformity right 3rd toe contributing to pressure on tip of digit     Past Medical/Surgical History: Active Ambulatory Problems     Diagnosis Date Noted    No Active Ambulatory Problems     Resolved Ambulatory Problems     Diagnosis Date Noted    No Resolved Ambulatory Problems     Past Medical History:   Diagnosis Date    Diabetes mellitus ([facility] )        Admitting Diagnosis: Diabetes ([place] [address] ) [E11 9]  Osteomyelitis ([facility] ) [M86 9]  Foot swelling [M79 89]    Age/Sex: 58 y o  female    Assessment/Plan:     Assessment:      Patient Active Problem List   Diagnosis    Hyponatremia    Elevated alkaline phosphatase level    Cellulitis of third toe, right    Skin ulcer of third toe of right foot ([facility] )    Type 2 diabetes mellitus with hyperglycemia (HCC)    Elevated sed rate         Plan:  1)  Cellulitis of the right third toe/Skin ulcer of the third toe of the right foot/Possible osteomyelitis/Elevated sed rate:  Admit the patient to inpatient status, med/surg level of care with telemetry monitoring  The patient will require at least a 2-midnight inpatient hospitalization for work-up and treatment of the symptomatology  Check blood cultures x 2 sets  Check an MRI with contrast of the right foot and right toes  Check a CRP level  Consult Dr Howie José of Podiatry  Normal saline IV fluids  Check a MRSA nasal screen  The patient will be initiated on broad-spectrum antibiotics with IV vancomycin and IV cefepime  The IV antibiotics can be narrowed depending on the MRI results and depending on the culture results     2)  Hyponatremia: This is likely a component of pseudohyponatremia in setting of hyperglycemia  Normal saline IV fluids    Follow the patient's sodium level      3)  Type 2 diabetes mellitus with hyperglycemia:  Check hemoglobin A1c  Follow patient's blood glucose results     4)  DVT Prophylaxis:  Lovenox 40 mg SQ every 24 hours    SCD's bilaterally                  Admission Orders:  Inpatient/Tele  Continuous Cardiac Monitoring  Serial Cardiac Enzymes q6h x 3  Consult Podiatry r/e right foot cellulitis (third toe)   Bilateral Sequential Compression Device  OT/PT Consult  MRI of Right Foot  SSI  NSS @ 100  Scheduled Meds:   cefepime 2,000 mg Intravenous Q12H   enoxaparin 40 mg Subcutaneous Q24H   insulin lispro 1-6 Units Subcutaneous HS   insulin lispro 2-12 Units Subcutaneous TID AC   vancomycin 17 5 mg/kg (Adjusted) Intravenous Q12H

## 2017-11-06 NOTE — PLAN OF CARE
Problem: PHYSICAL THERAPY ADULT  Goal: Performs mobility at highest level of function for planned discharge setting  See evaluation for individualized goals  Outcome: Completed Date Met: 11/06/17  Prognosis: Good     Assessment: Pt is a 58year old female presenting at an Independent level  Pt is independently ambulating in room ad windy  Pt with no functional deficits and is not currently in need of skilled PT  Will d/c PT  Recommendation: Home independently     PT - OK to Discharge: Yes    See flowsheet documentation for full assessment

## 2017-11-06 NOTE — H&P
H&P Exam - Lindsay Andino 58 y o  female MRN: [id number]    Unit/Bed#: 409-01 Encounter: [id number]    Assessment:  Patient Active Problem List   Diagnosis    Hyponatremia    Elevated alkaline phosphatase level    Cellulitis of third toe, right    Skin ulcer of third toe of right foot (HCC)    Type 2 diabetes mellitus with hyperglycemia (HCC)    Elevated sed rate       Plan:  1)  Cellulitis of the right third toe/Skin ulcer of the third toe of the right foot/Possible osteomyelitis/Elevated sed rate:  Admit the patient to inpatient status, med/surg level of care with telemetry monitoring  The patient will require at least a 2-midnight inpatient hospitalization for work-up and treatment of the symptomatology  Check blood cultures x 2 sets  Check an MRI with contrast of the right foot and right toes  Check a CRP level  Consult Dr Sapphire Doyle of Podiatry  Normal saline IV fluids  Check a MRSA nasal screen  The patient will be initiated on broad-spectrum antibiotics with IV vancomycin and IV cefepime  The IV antibiotics can be narrowed depending on the MRI results and depending on the culture results  2)  Hyponatremia: This is likely a component of pseudohyponatremia in setting of hyperglycemia  Normal saline IV fluids  Follow the patient's sodium level  3)  Type 2 diabetes mellitus with hyperglycemia:  Check hemoglobin A1c  Follow patient's blood glucose results  4)  DVT Prophylaxis:  Lovenox 40 mg SQ every 24 hours  SCD's bilaterally  History of Present Illness   The patient is 58year-old female who presented to the emergency room with the complaints of an infection of her right 3rd toe  Approximately 1 week ago, the patient experienced minor trauma to the right 3rd toe  She then removed dead skin from the right 3rd toe  She developed an ulceration of the right 3rd toe  She also developed pain and swelling involving her right 3rd toe    Over the last 24-48 hours, she has been experiencing subjective fevers and chills  No chest pain  No shortness of breath  No abdominal pain  No nausea or vomiting  Nothing seemed to relieve her symptoms  Her right 3rd toe pain worsened with weight-bearing  Review of Systems:  Per HPI, all other systems have been reviewed and were negative      Historical Information   Past Medical History:   Diagnosis Date    Diabetes mellitus ([facility] [address] )      Past Surgical History:   Procedure Laterality Date    APPENDECTOMY      BREAST LUMPECTOMY Left     CHOLECYSTECTOMY       Social History   History   Alcohol Use No     History   Drug Use No     History   Smoking Status    Never Smoker   Smokeless Tobacco    Never Used     Family History: non-contributory    Meds/Allergies   all medications and allergies reviewed  Allergies   Allergen Reactions    Penicillin Potassium-G [Penicillin G]     Sulfa Antibiotics        Objective   First Vitals:   Blood Pressure: (!) 224/112 (11/05/17 1731)  Pulse: (!) 107 (11/05/17 1731)  Temperature: 100 3 °F (37 9 °C) (11/05/17 1731)  Temp Source: Temporal (11/05/17 1731)  Respirations: 17 (11/05/17 1731)  Height: 5' 8" (172 7 cm) (11/05/17 2114)  Weight - Scale: 118 kg (260 lb 2 3 oz) (11/05/17 1731)  SpO2: 98 % (11/05/17 1731)    Current Vitals:   Blood Pressure: 157/74 (11/06/17 0052)  Pulse: 64 (11/06/17 0052)  Temperature: 98 3 °F (36 8 °C) (11/06/17 0052)  Temp Source: Temporal (11/06/17 0052)  Respirations: 17 (11/06/17 0052)  Height: 5' 8" (172 7 cm) (11/05/17 2114)  Weight - Scale: 118 kg (261 lb 3 9 oz) (11/05/17 2114)  SpO2: 97 % (11/06/17 0052)      Intake/Output Summary (Last 24 hours) at 11/06/17 0203  Last data filed at 11/05/17 2036   Gross per 24 hour   Intake             1050 ml   Output                0 ml   Net             1050 ml       Invasive Devices     Peripheral Intravenous Line            Peripheral IV 11/05/17 Right;Left Antecubital less than 1 day    Peripheral IV 11/05/17 Right;Left Wrist less than 1 day                Physical Exam   General:  NAD, awake, alert, oriented x 3  HEENT:  NC/AT, mucous membranes dry  Neck:  Supple, No JVP elevation  CV:  + S1, +S2, Tachycardic at times, Regular rhythm  Pulm:  Lung fields are CTA bilaterally  Abd:  Soft, Non-tender, Non-distended  Ext:  No clubbing/cyanosis/edema      Lab Results:  Reviewed  Lab Results   Component Value Date    WBC 11 06 (H) 11/05/2017    HGB 13 2 11/05/2017    HCT 38 7 11/05/2017    MCV 85 11/05/2017     11/05/2017     Lab Results   Component Value Date    GLUCOSE 292 (H) 11/05/2017    CALCIUM 9 5 11/05/2017     (L) 11/05/2017    K 3 8 11/05/2017    CO2 28 11/05/2017    CL 98 (L) 11/05/2017    BUN 16 11/05/2017    CREATININE 0 90 11/05/2017     Lab Results   Component Value Date    ALT 23 11/05/2017    AST 16 11/05/2017    ALKPHOS 134 (H) 11/05/2017    BILITOT 0 50 11/05/2017       Imaging:  CT scan of the right foot:  FINDINGS:     OSSEOUS STRUCTURES:  No fracture or dislocation  There is an ulcer of the very distal aspect of the 3rd toe which approaches but does not appear to contact the distal aspect of the 3rd distal phalanx  Mild irregularity of the underlying 3rd distal   phalanx may indicate osteomyelitis  Consider MRI follow-up      VISUALIZED MUSCULATURE:  Unremarkable      SOFT TISSUES:  No definite abscess      OTHER PERTINENT FINDINGS:  Moderate diffuse midfoot degenerative changes  Diffuse soft tissue swelling         IMPRESSION:     There is an ulcer of the very distal aspect of the 3rd toe which approaches but does not appear to contact the distal aspect of the 3rd distal phalanx  Mild irregularity of the underlying 3rd distal phalanx may indicate osteomyelitis  Consider MRI   follow-up          Code Status: Level 1 - Full Code  Advance Directive and Living Will:      Power of :    POLST:      Counseling / Coordination of Care: Total floor / unit time spent today 25 minutes

## 2017-11-06 NOTE — PLAN OF CARE
Problem: DISCHARGE PLANNING - CARE MANAGEMENT  Goal: Discharge to post-acute care or home with appropriate resources  INTERVENTIONS:  - Conduct assessment to determine patient/family and health care team treatment goals, and need for post-acute services based on payer coverage, community resources, and patient preferences, and barriers to discharge  - Address psychosocial, clinical, and financial barriers to discharge as identified in assessment in conjunction with the patient/family and health care team  - Arrange appropriate level of post-acute services according to patient's   needs and preference and payer coverage in collaboration with the physician and health care team  - Communicate with and update the patient/family, physician, and health care team regarding progress on the discharge plan  - Arrange appropriate transportation to post-acute venues  Outcome: Progressing  -home on dc without patient follow up

## 2017-11-06 NOTE — ED PROVIDER NOTES
History  Chief Complaint   Patient presents with    Foot Swelling     Right foot swelling for two weeks  States many objects that have fallen on her foot  HPI    None       Past Medical History:   Diagnosis Date    Diabetes mellitus ([address] )        Past Surgical History:   Procedure Laterality Date    APPENDECTOMY      BREAST LUMPECTOMY Left     CHOLECYSTECTOMY         History reviewed  No pertinent family history  I have reviewed and agree with the history as documented      Social History   Substance Use Topics    Smoking status: Never Smoker    Smokeless tobacco: Never Used    Alcohol use No        Review of Systems    Physical Exam  ED Triage Vitals [11/05/17 1731]   Temperature Pulse Respirations Blood Pressure SpO2   100 3 °F (37 9 °C) (!) 107 17 (!) 224/112 98 %      Temp Source Heart Rate Source Patient Position - Orthostatic VS BP Location FiO2 (%)   Temporal Monitor Lying Right arm --      Pain Score       5           Orthostatic Vital Signs  Vitals:    11/05/17 1815 11/05/17 1830 11/05/17 1845 11/05/17 1900   BP: (!) 189/82      Pulse: 97 103 89 88   Patient Position - Orthostatic VS:           Physical Exam    ED Medications  Medications   sodium chloride 0 9 % bolus 1,000 mL (not administered)   vancomycin (VANCOCIN) 1,750 mg in sodium chloride 0 9 % 500 mL IVPB (not administered)   cefepime (MAXIPIME) IVPB (premix) 2,000 mg (not administered)       Diagnostic Studies  Results Reviewed     Procedure Component Value Units Date/Time    CBC and differential [[id number]]     Lab Status:  No result Specimen:  Blood     Comprehensive metabolic panel [[id number]]     Lab Status:  No result Specimen:  Blood     Protime-INR [[id number]]     Lab Status:  No result Specimen:  Blood     APTT [[id number]]     Lab Status:  No result Specimen:  Blood     Sedimentation rate, automated [[id number]]     Lab Status:  No result Specimen:  Blood     C-reactive protein [[id number]]     Lab Status:  No result Specimen:  Blood Troponin I [[id number]]     Lab Status:  No result Specimen:  Blood     UA w Reflex to Microscopic w Reflex to Culture [[id number]]     Lab Status:  No result Specimen:  Urine     Lactic acid x2 Q2H [[id number]]     Lab Status:  No result Specimen:  Blood     Lactic acid x2 Q2H [[id number]]     Lab Status:  No result Specimen:  Blood     Blood culture #1 [[id number]]     Lab Status:  No result Specimen:  Blood     Blood culture #2 [[id number]]     Lab Status:  No result Specimen:  Blood                  CT foot right wo contrast   Final Result by Maria Elena Nur MD (11/05 1858)      There is an ulcer of the very distal aspect of the 3rd toe which approaches but does not appear to contact the distal aspect of the 3rd distal phalanx  Mild irregularity of the underlying 3rd distal phalanx may indicate osteomyelitis  Consider MRI    follow-up  ##imslh##imslh         Workstation performed: [id number]                    Procedures  Procedures       Phone Contacts  ED Phone Contact    ED Course  ED Course                                MDM  Number of Diagnoses or Management Options  Diagnosis management comments: Patient received in sign-out  Patient with possible osteomyelitis to the 3rd toe of the right foot  Patient is being given broad-spectrum antibiotics  I have spoken with Dr Frida Lane to admit    CritCare Time    Disposition  Final diagnoses:   None     ED Disposition     None      Follow-up Information    None       Patient's Medications    No medications on file     No discharge procedures on file      ED Provider  Electronically Signed by           Madai Hilliard DO  11/09/17 6093

## 2017-11-07 ENCOUNTER — APPOINTMENT (INPATIENT)
Dept: ULTRASOUND IMAGING | Facility: HOSPITAL | Age: 62
DRG: 617 | End: 2017-11-07
Payer: MEDICARE

## 2017-11-07 ENCOUNTER — APPOINTMENT (INPATIENT)
Dept: NUCLEAR MEDICINE | Facility: HOSPITAL | Age: 62
DRG: 617 | End: 2017-11-07
Payer: MEDICARE

## 2017-11-07 PROBLEM — E55.9 VITAMIN D DEFICIENCY: Status: ACTIVE | Noted: 2017-11-07

## 2017-11-07 PROBLEM — I10 ESSENTIAL HYPERTENSION: Status: ACTIVE | Noted: 2017-11-07

## 2017-11-07 LAB
ALBUMIN SERPL BCP-MCNC: 2.7 G/DL (ref 3.5–5)
ALP SERPL-CCNC: 102 U/L (ref 46–116)
ALT SERPL W P-5'-P-CCNC: 14 U/L (ref 12–78)
ANION GAP SERPL CALCULATED.3IONS-SCNC: 10 MMOL/L (ref 4–13)
AST SERPL W P-5'-P-CCNC: 11 U/L (ref 5–45)
BACTERIA UR CULT: NORMAL
BASOPHILS # BLD AUTO: 0.05 THOUSANDS/ΜL (ref 0–0.1)
BASOPHILS NFR BLD AUTO: 1 % (ref 0–1)
BILIRUB SERPL-MCNC: 0.4 MG/DL (ref 0.2–1)
BUN SERPL-MCNC: 6 MG/DL (ref 5–25)
CALCIUM SERPL-MCNC: 8.4 MG/DL (ref 8.3–10.1)
CHLORIDE SERPL-SCNC: 104 MMOL/L (ref 100–108)
CO2 SERPL-SCNC: 25 MMOL/L (ref 21–32)
CREAT SERPL-MCNC: 0.57 MG/DL (ref 0.6–1.3)
EOSINOPHIL # BLD AUTO: 0.17 THOUSAND/ΜL (ref 0–0.61)
EOSINOPHIL NFR BLD AUTO: 2 % (ref 0–6)
ERYTHROCYTE [DISTWIDTH] IN BLOOD BY AUTOMATED COUNT: 13.5 % (ref 11.6–15.1)
GFR SERPL CREATININE-BSD FRML MDRD: 100 ML/MIN/1.73SQ M
GLUCOSE SERPL-MCNC: 212 MG/DL (ref 65–140)
GLUCOSE SERPL-MCNC: 216 MG/DL (ref 65–140)
GLUCOSE SERPL-MCNC: 233 MG/DL (ref 65–140)
GLUCOSE SERPL-MCNC: 290 MG/DL (ref 65–140)
GLUCOSE SERPL-MCNC: 300 MG/DL (ref 65–140)
HCT VFR BLD AUTO: 36.5 % (ref 34.8–46.1)
HGB BLD-MCNC: 12.3 G/DL (ref 11.5–15.4)
LYMPHOCYTES # BLD AUTO: 1.87 THOUSANDS/ΜL (ref 0.6–4.47)
LYMPHOCYTES NFR BLD AUTO: 25 % (ref 14–44)
MCH RBC QN AUTO: 28.7 PG (ref 26.8–34.3)
MCHC RBC AUTO-ENTMCNC: 33.7 G/DL (ref 31.4–37.4)
MCV RBC AUTO: 85 FL (ref 82–98)
MONOCYTES # BLD AUTO: 0.53 THOUSAND/ΜL (ref 0.17–1.22)
MONOCYTES NFR BLD AUTO: 7 % (ref 4–12)
MRSA NOSE QL CULT: NORMAL
NEUTROPHILS # BLD AUTO: 4.92 THOUSANDS/ΜL (ref 1.85–7.62)
NEUTS SEG NFR BLD AUTO: 65 % (ref 43–75)
PLATELET # BLD AUTO: 271 THOUSANDS/UL (ref 149–390)
PMV BLD AUTO: 9.5 FL (ref 8.9–12.7)
POTASSIUM SERPL-SCNC: 3.5 MMOL/L (ref 3.5–5.3)
PROT SERPL-MCNC: 7.1 G/DL (ref 6.4–8.2)
RBC # BLD AUTO: 4.28 MILLION/UL (ref 3.81–5.12)
SODIUM SERPL-SCNC: 139 MMOL/L (ref 136–145)
VANCOMYCIN TROUGH SERPL-MCNC: 12.2 UG/ML (ref 10–20)
WBC # BLD AUTO: 7.54 THOUSAND/UL (ref 4.31–10.16)

## 2017-11-07 PROCEDURE — 80053 COMPREHEN METABOLIC PANEL: CPT | Performed by: PHYSICIAN ASSISTANT

## 2017-11-07 PROCEDURE — 85025 COMPLETE CBC W/AUTO DIFF WBC: CPT | Performed by: PHYSICIAN ASSISTANT

## 2017-11-07 PROCEDURE — 93970 EXTREMITY STUDY: CPT

## 2017-11-07 PROCEDURE — 82948 REAGENT STRIP/BLOOD GLUCOSE: CPT

## 2017-11-07 PROCEDURE — 80202 ASSAY OF VANCOMYCIN: CPT | Performed by: INTERNAL MEDICINE

## 2017-11-07 RX ORDER — LABETALOL HYDROCHLORIDE 5 MG/ML
10 INJECTION, SOLUTION INTRAVENOUS ONCE
Status: COMPLETED | OUTPATIENT
Start: 2017-11-07 | End: 2017-11-07

## 2017-11-07 RX ORDER — INSULIN ASPART 100 [IU]/ML
15 INJECTION, SUSPENSION SUBCUTANEOUS
Status: DISCONTINUED | OUTPATIENT
Start: 2017-11-07 | End: 2017-11-09 | Stop reason: HOSPADM

## 2017-11-07 RX ORDER — LISINOPRIL 10 MG/1
10 TABLET ORAL DAILY
Status: DISCONTINUED | OUTPATIENT
Start: 2017-11-07 | End: 2017-11-09 | Stop reason: HOSPADM

## 2017-11-07 RX ORDER — HYDRALAZINE HYDROCHLORIDE 20 MG/ML
10 INJECTION INTRAMUSCULAR; INTRAVENOUS EVERY 6 HOURS PRN
Status: DISCONTINUED | OUTPATIENT
Start: 2017-11-07 | End: 2017-11-08

## 2017-11-07 RX ORDER — ERGOCALCIFEROL 1.25 MG/1
50000 CAPSULE ORAL WEEKLY
Status: DISCONTINUED | OUTPATIENT
Start: 2017-11-07 | End: 2017-11-09 | Stop reason: HOSPADM

## 2017-11-07 RX ADMIN — HYDRALAZINE HYDROCHLORIDE 10 MG: 20 INJECTION INTRAMUSCULAR; INTRAVENOUS at 12:34

## 2017-11-07 RX ADMIN — OXYCODONE HYDROCHLORIDE 5 MG: 5 TABLET ORAL at 08:53

## 2017-11-07 RX ADMIN — LISINOPRIL 10 MG: 10 TABLET ORAL at 10:45

## 2017-11-07 RX ADMIN — CEFEPIME 2000 MG: 2 INJECTION, POWDER, FOR SOLUTION INTRAMUSCULAR; INTRAVENOUS at 19:29

## 2017-11-07 RX ADMIN — LABETALOL 20 MG/4 ML (5 MG/ML) INTRAVENOUS SYRINGE 10 MG: at 13:28

## 2017-11-07 RX ADMIN — VANCOMYCIN HYDROCHLORIDE 1500 MG: 5 INJECTION, POWDER, LYOPHILIZED, FOR SOLUTION INTRAVENOUS at 20:19

## 2017-11-07 RX ADMIN — INSULIN LISPRO 4 UNITS: 100 INJECTION, SOLUTION INTRAVENOUS; SUBCUTANEOUS at 21:49

## 2017-11-07 RX ADMIN — INSULIN LISPRO 4 UNITS: 100 INJECTION, SOLUTION INTRAVENOUS; SUBCUTANEOUS at 12:27

## 2017-11-07 RX ADMIN — SODIUM CHLORIDE 100 ML/HR: 0.9 INJECTION, SOLUTION INTRAVENOUS at 06:26

## 2017-11-07 RX ADMIN — INSULIN ASPART 15 UNITS: 100 INJECTION, SUSPENSION SUBCUTANEOUS at 15:54

## 2017-11-07 RX ADMIN — HYDRALAZINE HYDROCHLORIDE 10 MG: 20 INJECTION INTRAMUSCULAR; INTRAVENOUS at 23:59

## 2017-11-07 RX ADMIN — OXYCODONE HYDROCHLORIDE 5 MG: 5 TABLET ORAL at 13:21

## 2017-11-07 RX ADMIN — INSULIN LISPRO 6 UNITS: 100 INJECTION, SOLUTION INTRAVENOUS; SUBCUTANEOUS at 15:54

## 2017-11-07 RX ADMIN — ERGOCALCIFEROL 50000 UNITS: 1.25 CAPSULE ORAL at 10:45

## 2017-11-07 RX ADMIN — ENOXAPARIN SODIUM 40 MG: 40 INJECTION SUBCUTANEOUS at 03:08

## 2017-11-07 RX ADMIN — VANCOMYCIN HYDROCHLORIDE 1500 MG: 5 INJECTION, POWDER, LYOPHILIZED, FOR SOLUTION INTRAVENOUS at 10:45

## 2017-11-07 RX ADMIN — INSULIN LISPRO 4 UNITS: 100 INJECTION, SOLUTION INTRAVENOUS; SUBCUTANEOUS at 08:35

## 2017-11-07 RX ADMIN — CEFEPIME 2000 MG: 2 INJECTION, POWDER, FOR SOLUTION INTRAMUSCULAR; INTRAVENOUS at 08:37

## 2017-11-07 NOTE — PROGRESS NOTES
Progress Note - Podiatry/Wound   Katie Villagomez 58 y o  female MRN: [id number]  Unit/Bed#: 409-01 Encounter: [id number]      Assessment:   1  Diabetic wound RT 3rd toe, possible underlying osteomyelitis  2  Resolving cellulitis RT 3rd toe/forefoot    Plan:   Continue IV antibiotics  Ceretec WBC scan to be completed tomorrow around noon  If negative, can discharge home on PO antibiotics  If positive, to OR around 3PM for phalangectomy to remove infected bone, discussed with patient  Expect surgical cure with phalangectomy  NPO past midnight tonight until Ceretec scan results are obtained early tomorrow afternoon due to likelihood of bone infection  Subjective:  Patient feels well  Minimal pain  Blood draw for Ceretec performed this AM     Objective:    Vitals: Blood pressure (!) 187/81, pulse 75, temperature 97 7 °F (36 5 °C), temperature source Temporal, resp  rate 18, height 5' 8" (1 727 m), weight 116 kg (254 lb 12 8 oz), SpO2 96 %  ,Body mass index is 38 74 kg/m²  Physical Exam:     Right 3rd toe - no acute drainage from eschar/ulcer site  Decreasing erythema/edeam to digit and right foot  No gangrenous changes noted  Pedal pulses palpable  Lab, Imaging and other studies: I have personally reviewed pertinent reports  Other Ulcers 11/05/17 Toe (Comment which one) Right red with yellow edges  (Active)   Wound Description Intact;Edema;Fragile 11/6/2017  8:00 AM   Laurie-wound Assessment Fragile;Erythema 11/6/2017  8:00 AM   Shape round 11/6/2017  1:00 AM   Drainage Amount None 11/6/2017  8:00 AM   Treatment Elevated with pillow(s); Offload 11/6/2017  1:00 AM   Dressing Status Clean;Dry; Intact 11/6/2017  7:35 PM

## 2017-11-07 NOTE — PROGRESS NOTES
BP rechecked and /100  Chaparro Mew PA made aware  See new orders for one time dose  Will continue to monitor

## 2017-11-07 NOTE — PROGRESS NOTES
[address] Internal Medicine Progress Note  Patient: Laura Ye 58 y o  female   MRN: [id number]  PCP: Cristela Beltre MD  Unit/Bed#: 856-17 Encounter: [id number]  Date Of Visit: 11/07/17    Assessment:    Principal Problem:    Cellulitis of third toe, right  Active Problems:    Hyponatremia    Elevated alkaline phosphatase level    Skin ulcer of third toe of right foot ([address] )    Type 2 diabetes mellitus with hyperglycemia (HCC)    Elevated sed rate    Essential hypertension    Vitamin D deficiency      Plan:    · Cellulitis of the right third toe/skin ulcer of the third of the right foot/Possible osteomyelitis: Continue IV Cefepime and IV Vancomycin  The patient refused MRI due to claustrophobia  Ceretec WBC scan to be completed tomorrow around noon  If negative, can discharge home on PO antibiotics  If positive, to OR around 3 pm for phalangectomy  The patient will be NPO after midnight for possible OR until Ceretec scan results are obtained  MRSA culture pending  Blood cultures show no growth at 24 hours  · Hyponatremia: resolved with IV fluids  · Diabetes mellitus type 2 with hyperglycemia: Hgb A1C is 12  The patients she stopped taking her PO antihyperglycemics due to cost  Will start the patient on NovoLog 70/30 15 units subcutaneous BID  The patient does not have a prescription plan and she will be able to get Relion brand 70/30 at [facility] at low cost  Continue with insulin sliding scale  Nutrition consulted for diabetic teaching  Will check lipid panel  · Essential hypertension: the patient's blood pressure has been consistently elevated and the patient denies pain  The patient states she's never taking any medications for her blood pressure  Will start the patient on Lisinopril 10 mg PO daily since it is kidney protective with the patient's history of DM type 2  The patient will also be able to get this medication at low cost from [place]     · Vitamin D deficiency: the patient's vitamin D level is noted to be 9 7  Will initiate Ergocalciferol 50,000 units PO weekly x 8 weeks  The patient will need to have a repeat vitamin D level in 8 weeks  VTE Pharmacologic Prophylaxis:   Pharmacologic: Pharmacologic VTE Prophylaxis contraindicated due to held for possible OR tomorrow  Mechanical VTE Prophylaxis in Place: Yes    Discussions with Specialists or Other Care Team Provider: Nursing, CM, and attending Dr Waldo Grimes for Care: 30 minutes  More than 50% of total time spent on counseling and coordination of care as described above  Current Length of Stay: 2 day(s)    Current Patient Status: Inpatient   Certification Statement: The patient will continue to require additional inpatient hospital stay due to continued need for IV antibiotics and workup for possible osteomyelitis      Code Status: Level 1 - Full Code      Subjective: The patient was seen and examined  The patient denies any complaints  No acute events overnight  Objective:     Vitals:   Temp (24hrs), Av 4 °F (36 9 °C), Min:97 7 °F (36 5 °C), Max:99 °F (37 2 °C)    HR:  [75-78] 75  Resp:  [18] 18  BP: (186-187)/(81-90) 187/81  SpO2:  [96 %] 96 %  Body mass index is 38 74 kg/m²  Input and Output Summary (last 24 hours): Intake/Output Summary (Last 24 hours) at 17 1008  Last data filed at 17 0842   Gross per 24 hour   Intake             4025 ml   Output             1650 ml   Net             2375 ml       Physical Exam:     Physical Exam   Constitutional: She is oriented to person, place, and time  She appears well-developed and well-nourished  She is active and cooperative  obese   Cardiovascular: Normal rate, regular rhythm and normal heart sounds  Pulmonary/Chest: Effort normal and breath sounds normal  She has no wheezes  She has no rhonchi  She has no rales  Abdominal: Soft  Normal appearance and bowel sounds are normal  There is no tenderness     Neurological: She is alert and oriented to person, place, and time  No cranial nerve deficit  Skin:   Right 3rd toe ulcer  Nursing note and vitals reviewed  Additional Data:     Labs:      Results from last 7 days  Lab Units 11/07/17  0502   WBC Thousand/uL 7 54   HEMOGLOBIN g/dL 12 3   HEMATOCRIT % 36 5   PLATELETS Thousands/uL 271   NEUTROS PCT % 65   LYMPHS PCT % 25   MONOS PCT % 7   EOS PCT % 2       Results from last 7 days  Lab Units 11/07/17  0502   SODIUM mmol/L 139   POTASSIUM mmol/L 3 5   CHLORIDE mmol/L 104   CO2 mmol/L 25   BUN mg/dL 6   CREATININE mg/dL 0 57*   CALCIUM mg/dL 8 4   TOTAL PROTEIN g/dL 7 1   BILIRUBIN TOTAL mg/dL 0 40   ALK PHOS U/L 102   ALT U/L 14   AST U/L 11   GLUCOSE RANDOM mg/dL 216*       Results from last 7 days  Lab Units 11/05/17  1936   INR  0 99       * I Have Reviewed All Lab Data Listed Above  * Additional Pertinent Lab Tests Reviewed: All Labs Within Last 24 Hours Reviewed    Imaging:    Imaging Reports Reviewed Today Include: none  Imaging Personally Reviewed by Myself Includes:  none    Recent Cultures (last 7 days):       Results from last 7 days  Lab Units 11/06/17  1013 11/06/17  0405 11/05/17  1936 11/05/17  1932   BLOOD CULTURE   --   --  No Growth at 24 hrs  No Growth at 24 hrs  GRAM STAIN RESULT  No polys seen  1+ Gram positive cocci in pairs  --   --   --    URINE CULTURE   --  No Growth <1000 cfu/mL  --   --        Last 24 Hours Medication List:     cefepime 2,000 mg Intravenous Q12H   ergocalciferol 50,000 Units Oral Weekly   insulin aspart protamine-insulin aspart 15 Units Subcutaneous BID AC   insulin lispro 1-6 Units Subcutaneous HS   insulin lispro 2-12 Units Subcutaneous TID AC   lisinopril 10 mg Oral Daily   vancomycin 17 5 mg/kg (Adjusted) Intravenous Q12H        Today, Patient Was Seen By: Shameka Esteves PA-C    ** Please Note: This note has been constructed using a voice recognition system   **

## 2017-11-07 NOTE — OCCUPATIONAL THERAPY NOTE
Occupational Therapy         Patient Name: Mitchel BYNUMZMOKIKE Date: 11/7/2017        Order received and chart review performed; pt is performing at (I) level at [facility]; screened by PT and noted the same; d/c OT orders at this time

## 2017-11-08 ENCOUNTER — ANESTHESIA (INPATIENT)
Dept: PERIOP | Facility: HOSPITAL | Age: 62
DRG: 617 | End: 2017-11-08
Payer: MEDICARE

## 2017-11-08 ENCOUNTER — APPOINTMENT (INPATIENT)
Dept: NUCLEAR MEDICINE | Facility: HOSPITAL | Age: 62
DRG: 617 | End: 2017-11-08
Payer: MEDICARE

## 2017-11-08 ENCOUNTER — ANESTHESIA EVENT (INPATIENT)
Dept: PERIOP | Facility: HOSPITAL | Age: 62
DRG: 617 | End: 2017-11-08
Payer: MEDICARE

## 2017-11-08 ENCOUNTER — APPOINTMENT (INPATIENT)
Dept: RADIOLOGY | Facility: HOSPITAL | Age: 62
DRG: 617 | End: 2017-11-08
Payer: MEDICARE

## 2017-11-08 LAB
ANION GAP SERPL CALCULATED.3IONS-SCNC: 8 MMOL/L (ref 4–13)
BACTERIA WND AEROBE CULT: ABNORMAL
BACTERIA WND AEROBE CULT: ABNORMAL
BASOPHILS # BLD AUTO: 0.04 THOUSANDS/ΜL (ref 0–0.1)
BASOPHILS NFR BLD AUTO: 1 % (ref 0–1)
BUN SERPL-MCNC: 8 MG/DL (ref 5–25)
CALCIUM SERPL-MCNC: 8.6 MG/DL (ref 8.3–10.1)
CHLORIDE SERPL-SCNC: 103 MMOL/L (ref 100–108)
CHOLEST SERPL-MCNC: 154 MG/DL (ref 50–200)
CO2 SERPL-SCNC: 29 MMOL/L (ref 21–32)
CREAT SERPL-MCNC: 0.65 MG/DL (ref 0.6–1.3)
EOSINOPHIL # BLD AUTO: 0.17 THOUSAND/ΜL (ref 0–0.61)
EOSINOPHIL NFR BLD AUTO: 2 % (ref 0–6)
ERYTHROCYTE [DISTWIDTH] IN BLOOD BY AUTOMATED COUNT: 13.8 % (ref 11.6–15.1)
GFR SERPL CREATININE-BSD FRML MDRD: 95 ML/MIN/1.73SQ M
GLUCOSE SERPL-MCNC: 123 MG/DL (ref 65–140)
GLUCOSE SERPL-MCNC: 133 MG/DL (ref 65–140)
GLUCOSE SERPL-MCNC: 153 MG/DL (ref 65–140)
GLUCOSE SERPL-MCNC: 190 MG/DL (ref 65–140)
GLUCOSE SERPL-MCNC: 193 MG/DL (ref 65–140)
GLUCOSE SERPL-MCNC: 220 MG/DL (ref 65–140)
GRAM STN SPEC: ABNORMAL
GRAM STN SPEC: ABNORMAL
HCT VFR BLD AUTO: 36.8 % (ref 34.8–46.1)
HDLC SERPL-MCNC: 47 MG/DL (ref 40–60)
HGB BLD-MCNC: 12.2 G/DL (ref 11.5–15.4)
LDLC SERPL CALC-MCNC: 87 MG/DL (ref 0–100)
LYMPHOCYTES # BLD AUTO: 2.28 THOUSANDS/ΜL (ref 0.6–4.47)
LYMPHOCYTES NFR BLD AUTO: 26 % (ref 14–44)
MCH RBC QN AUTO: 28.5 PG (ref 26.8–34.3)
MCHC RBC AUTO-ENTMCNC: 33.2 G/DL (ref 31.4–37.4)
MCV RBC AUTO: 86 FL (ref 82–98)
MONOCYTES # BLD AUTO: 0.56 THOUSAND/ΜL (ref 0.17–1.22)
MONOCYTES NFR BLD AUTO: 6 % (ref 4–12)
NEUTROPHILS # BLD AUTO: 5.79 THOUSANDS/ΜL (ref 1.85–7.62)
NEUTS SEG NFR BLD AUTO: 65 % (ref 43–75)
PLATELET # BLD AUTO: 291 THOUSANDS/UL (ref 149–390)
PMV BLD AUTO: 9.4 FL (ref 8.9–12.7)
POTASSIUM SERPL-SCNC: 3.2 MMOL/L (ref 3.5–5.3)
RBC # BLD AUTO: 4.28 MILLION/UL (ref 3.81–5.12)
SODIUM SERPL-SCNC: 140 MMOL/L (ref 136–145)
TRIGL SERPL-MCNC: 100 MG/DL
WBC # BLD AUTO: 8.84 THOUSAND/UL (ref 4.31–10.16)

## 2017-11-08 PROCEDURE — 0Y6T0Z3 DETACHMENT AT RIGHT 3RD TOE, LOW, OPEN APPROACH: ICD-10-PCS | Performed by: PODIATRIST

## 2017-11-08 PROCEDURE — 87147 CULTURE TYPE IMMUNOLOGIC: CPT | Performed by: PODIATRIST

## 2017-11-08 PROCEDURE — 78805 HB ABSCESS IMAGING LTD AREA: CPT

## 2017-11-08 PROCEDURE — 82948 REAGENT STRIP/BLOOD GLUCOSE: CPT

## 2017-11-08 PROCEDURE — 80061 LIPID PANEL: CPT | Performed by: PHYSICIAN ASSISTANT

## 2017-11-08 PROCEDURE — A9569 TECHNETIUM TC-99M AUTO WBC: HCPCS

## 2017-11-08 PROCEDURE — 87176 TISSUE HOMOGENIZATION CULTR: CPT | Performed by: PODIATRIST

## 2017-11-08 PROCEDURE — 87070 CULTURE OTHR SPECIMN AEROBIC: CPT | Performed by: PODIATRIST

## 2017-11-08 PROCEDURE — 73630 X-RAY EXAM OF FOOT: CPT

## 2017-11-08 PROCEDURE — 88311 DECALCIFY TISSUE: CPT | Performed by: PODIATRIST

## 2017-11-08 PROCEDURE — 87205 SMEAR GRAM STAIN: CPT | Performed by: PODIATRIST

## 2017-11-08 PROCEDURE — 88304 TISSUE EXAM BY PATHOLOGIST: CPT | Performed by: PODIATRIST

## 2017-11-08 PROCEDURE — 85025 COMPLETE CBC W/AUTO DIFF WBC: CPT | Performed by: PHYSICIAN ASSISTANT

## 2017-11-08 PROCEDURE — 80048 BASIC METABOLIC PNL TOTAL CA: CPT | Performed by: PHYSICIAN ASSISTANT

## 2017-11-08 RX ORDER — ONDANSETRON 2 MG/ML
4 INJECTION INTRAMUSCULAR; INTRAVENOUS ONCE AS NEEDED
Status: DISCONTINUED | OUTPATIENT
Start: 2017-11-08 | End: 2017-11-08

## 2017-11-08 RX ORDER — FENTANYL CITRATE 50 UG/ML
INJECTION, SOLUTION INTRAMUSCULAR; INTRAVENOUS AS NEEDED
Status: DISCONTINUED | OUTPATIENT
Start: 2017-11-08 | End: 2017-11-08 | Stop reason: SURG

## 2017-11-08 RX ORDER — HEPARIN SODIUM 5000 [USP'U]/ML
5000 INJECTION, SOLUTION INTRAVENOUS; SUBCUTANEOUS EVERY 8 HOURS SCHEDULED
Status: DISCONTINUED | OUTPATIENT
Start: 2017-11-08 | End: 2017-11-09 | Stop reason: HOSPADM

## 2017-11-08 RX ORDER — LABETALOL HYDROCHLORIDE 5 MG/ML
10 INJECTION, SOLUTION INTRAVENOUS EVERY 4 HOURS PRN
Status: DISCONTINUED | OUTPATIENT
Start: 2017-11-08 | End: 2017-11-09 | Stop reason: HOSPADM

## 2017-11-08 RX ORDER — PROPOFOL 10 MG/ML
INJECTION, EMULSION INTRAVENOUS CONTINUOUS PRN
Status: DISCONTINUED | OUTPATIENT
Start: 2017-11-08 | End: 2017-11-08 | Stop reason: SURG

## 2017-11-08 RX ORDER — MIDAZOLAM HYDROCHLORIDE 1 MG/ML
INJECTION INTRAMUSCULAR; INTRAVENOUS AS NEEDED
Status: DISCONTINUED | OUTPATIENT
Start: 2017-11-08 | End: 2017-11-08 | Stop reason: SURG

## 2017-11-08 RX ORDER — LIDOCAINE HYDROCHLORIDE 10 MG/ML
INJECTION, SOLUTION INFILTRATION; PERINEURAL AS NEEDED
Status: DISCONTINUED | OUTPATIENT
Start: 2017-11-08 | End: 2017-11-08 | Stop reason: SURG

## 2017-11-08 RX ORDER — PROPOFOL 10 MG/ML
INJECTION, EMULSION INTRAVENOUS AS NEEDED
Status: DISCONTINUED | OUTPATIENT
Start: 2017-11-08 | End: 2017-11-08 | Stop reason: SURG

## 2017-11-08 RX ORDER — LABETALOL HYDROCHLORIDE 5 MG/ML
INJECTION, SOLUTION INTRAVENOUS AS NEEDED
Status: DISCONTINUED | OUTPATIENT
Start: 2017-11-08 | End: 2017-11-08 | Stop reason: SURG

## 2017-11-08 RX ORDER — FENTANYL CITRATE/PF 50 MCG/ML
25 SYRINGE (ML) INJECTION
Status: DISCONTINUED | OUTPATIENT
Start: 2017-11-08 | End: 2017-11-08

## 2017-11-08 RX ORDER — BUPIVACAINE HYDROCHLORIDE 5 MG/ML
INJECTION, SOLUTION PERINEURAL AS NEEDED
Status: DISCONTINUED | OUTPATIENT
Start: 2017-11-08 | End: 2017-11-08 | Stop reason: HOSPADM

## 2017-11-08 RX ORDER — SODIUM CHLORIDE, SODIUM LACTATE, POTASSIUM CHLORIDE, CALCIUM CHLORIDE 600; 310; 30; 20 MG/100ML; MG/100ML; MG/100ML; MG/100ML
INJECTION, SOLUTION INTRAVENOUS CONTINUOUS PRN
Status: DISCONTINUED | OUTPATIENT
Start: 2017-11-08 | End: 2017-11-08 | Stop reason: SURG

## 2017-11-08 RX ADMIN — FENTANYL CITRATE 25 MCG: 50 INJECTION, SOLUTION INTRAMUSCULAR; INTRAVENOUS at 14:10

## 2017-11-08 RX ADMIN — CEFEPIME 2000 MG: 2 INJECTION, POWDER, FOR SOLUTION INTRAMUSCULAR; INTRAVENOUS at 19:43

## 2017-11-08 RX ADMIN — SODIUM CHLORIDE 100 ML/HR: 0.9 INJECTION, SOLUTION INTRAVENOUS at 05:05

## 2017-11-08 RX ADMIN — INSULIN ASPART 15 UNITS: 100 INJECTION, SUSPENSION SUBCUTANEOUS at 07:57

## 2017-11-08 RX ADMIN — INSULIN LISPRO 4 UNITS: 100 INJECTION, SOLUTION INTRAVENOUS; SUBCUTANEOUS at 07:54

## 2017-11-08 RX ADMIN — CEFEPIME 2000 MG: 2 INJECTION, POWDER, FOR SOLUTION INTRAMUSCULAR; INTRAVENOUS at 07:52

## 2017-11-08 RX ADMIN — LIDOCAINE HYDROCHLORIDE 3030 MG: 10 INJECTION, SOLUTION INFILTRATION; PERINEURAL at 14:04

## 2017-11-08 RX ADMIN — FENTANYL CITRATE 25 MCG: 50 INJECTION, SOLUTION INTRAMUSCULAR; INTRAVENOUS at 14:20

## 2017-11-08 RX ADMIN — PROPOFOL 30 MCG/KG/MIN: 10 INJECTION, EMULSION INTRAVENOUS at 14:10

## 2017-11-08 RX ADMIN — MIDAZOLAM HYDROCHLORIDE 1 MG: 1 INJECTION, SOLUTION INTRAMUSCULAR; INTRAVENOUS at 14:00

## 2017-11-08 RX ADMIN — LABETALOL HYDROCHLORIDE 10 MG: 5 INJECTION, SOLUTION INTRAVENOUS at 14:31

## 2017-11-08 RX ADMIN — LISINOPRIL 10 MG: 10 TABLET ORAL at 08:03

## 2017-11-08 RX ADMIN — PROPOFOL 20 MG: 10 INJECTION, EMULSION INTRAVENOUS at 14:04

## 2017-11-08 RX ADMIN — VANCOMYCIN HYDROCHLORIDE 1500 MG: 5 INJECTION, POWDER, LYOPHILIZED, FOR SOLUTION INTRAVENOUS at 09:30

## 2017-11-08 RX ADMIN — INSULIN LISPRO 2 UNITS: 100 INJECTION, SOLUTION INTRAVENOUS; SUBCUTANEOUS at 21:12

## 2017-11-08 RX ADMIN — FENTANYL CITRATE 50 MCG: 50 INJECTION, SOLUTION INTRAMUSCULAR; INTRAVENOUS at 14:03

## 2017-11-08 RX ADMIN — MIDAZOLAM HYDROCHLORIDE 1 MG: 1 INJECTION, SOLUTION INTRAMUSCULAR; INTRAVENOUS at 14:03

## 2017-11-08 RX ADMIN — LABETALOL HYDROCHLORIDE 5 MG: 5 INJECTION, SOLUTION INTRAVENOUS at 14:25

## 2017-11-08 RX ADMIN — OXYCODONE HYDROCHLORIDE 5 MG: 5 TABLET ORAL at 07:58

## 2017-11-08 RX ADMIN — INSULIN ASPART 15 UNITS: 100 INJECTION, SUSPENSION SUBCUTANEOUS at 17:47

## 2017-11-08 RX ADMIN — OXYCODONE HYDROCHLORIDE 5 MG: 5 TABLET ORAL at 11:51

## 2017-11-08 RX ADMIN — VANCOMYCIN HYDROCHLORIDE 1500 MG: 5 INJECTION, POWDER, LYOPHILIZED, FOR SOLUTION INTRAVENOUS at 21:12

## 2017-11-08 RX ADMIN — HEPARIN SODIUM 5000 UNITS: 5000 INJECTION, SOLUTION INTRAVENOUS; SUBCUTANEOUS at 21:12

## 2017-11-08 RX ADMIN — SODIUM CHLORIDE, SODIUM LACTATE, POTASSIUM CHLORIDE, AND CALCIUM CHLORIDE: .6; .31; .03; .02 INJECTION, SOLUTION INTRAVENOUS at 13:53

## 2017-11-08 NOTE — SOCIAL WORK
CM continuing to follow pt and assist in dc planning  Pt to the OR today with Dr Marielos Pond  Will continue to follow

## 2017-11-08 NOTE — CONSULTS
Consult received and appreciated  Full assessment completed on 11/7/2017  Met with patient today to review diabetic diet and label reading  Provided written information on diet and brochures on MNT outpatient counseling and DSMT classes  Patient verbalized understanding  RD contact information provided  For OR today for possible phalangectomy  Continue to reinforce diet education

## 2017-11-08 NOTE — PLAN OF CARE
Problem: DISCHARGE PLANNING - CARE MANAGEMENT  Goal: Discharge to post-acute care or home with appropriate resources  INTERVENTIONS:  - Conduct assessment to determine patient/family and health care team treatment goals, and need for post-acute services based on payer coverage, community resources, and patient preferences, and barriers to discharge  - Address psychosocial, clinical, and financial barriers to discharge as identified in assessment in conjunction with the patient/family and health care team  - Arrange appropriate level of post-acute services according to patient's   needs and preference and payer coverage in collaboration with the physician and health care team  - Communicate with and update the patient/family, physician, and health care team regarding progress on the discharge plan  - Arrange appropriate transportation to post-acute venues   Outcome: Progressing  -home on dc with outpatient follow up  -probable po abx on dc

## 2017-11-08 NOTE — PROGRESS NOTES
Progress Note - Podiatry/Wound   Abdoulaye Lilly 58 y o  female MRN: [id number]  Unit/Bed#: 409-01 Encounter: [id number]      Assessment:   1  Diabetic wound RT 3rd toe, possible underlying osteomyelitis  2  Resolving cellulitis RT 3rd toe/forefoot    Plan:   Continue IV antibiotics  Ceretec WBC scan to be completed this AM   If negative, can discharge home on PO antibiotics  If positive, to OR around 2PM for phalangectomy to remove infected bone, discussed with patient, all A/R/C fully explained and informed surgical consent obtained for surgery if needed  Expect surgical cure with phalangectomy  Cont NPO  Subjective:  Patient feels well  Minimal pain  Ceretec to be performed this AM     Objective:    Vitals: Blood pressure (!) 191/102, pulse (!) 114, temperature 97 6 °F (36 4 °C), temperature source Temporal, resp  rate 18, height 5' 8" (1 727 m), weight 116 kg (255 lb 8 2 oz), SpO2 93 %  ,Body mass index is 38 85 kg/m²  Physical Exam:     Right 3rd toe - no acute drainage from eschar/ulcer site  Decreasing erythema/edeam to digit and right foot  No gangrenous changes noted  Pedal pulses palpable  Lab, Imaging and other studies: I have personally reviewed pertinent reports  Other Ulcers 11/05/17 Toe (Comment which one) Right red with yellow edges  (Active)   Wound Description Intact;Edema;Fragile 11/6/2017  8:00 AM   Laurie-wound Assessment Fragile;Erythema 11/6/2017  8:00 AM   Shape round 11/6/2017  1:00 AM   Drainage Amount None 11/6/2017  8:00 AM   Treatment Elevated with pillow(s); Offload 11/6/2017  1:00 AM   Dressing Status Clean;Dry; Intact 11/7/2017  1:21 PM

## 2017-11-08 NOTE — PROGRESS NOTES
Progress Note - Laura Angelo 58 y o  female MRN: [id number]    Unit/Bed#: 409-01 Encounter: [id number]      Assessment:  Patient Active Problem List   Diagnosis    Hyponatremia    Elevated alkaline phosphatase level    Cellulitis of third toe, right    Skin ulcer of third toe of right foot ([address] )    Type 2 diabetes mellitus with hyperglycemia (HCC)    Elevated sed rate    Essential hypertension    Vitamin D deficiency         Plan:  1)  Cellulitis of the right third toe/Skin ulcer of the third toe of the right foot/Possible osteomyelitis/Elevated sed rate: The patient refused an MRI secondary to having claustrophobia  A Ceretec white blood cell tagged scan will be completed today, 11/08/2017  If the test is positive for osteomyelitis, the patient will have surgical procedure completed today, 11/08/2017, per Dr Rajendra Brooke of Podiatry  Continue IV cefepime and IV vancomycin  Follow culture results     2)  Hyponatremia:  The hyponatremia has resolved  There was likely a component of pseudohyponatremia in setting of hyperglycemia  Continue normal saline IV fluids for now     3)  Type 2 diabetes mellitus with hyperglycemia:  Hemoglobin A1c was completed with the following results:  Results for Cleve Villagomez (MRN [id number]) as of 11/8/2017 10:11   Ref  Range 11/6/2017 04:05 11/6/2017 05:41   Hemoglobin A1C Latest Ref Range: 4 2 - 6 3 %  12 0 (H)   EAG Latest Units: mg/dl  298     Continue the current insulin regimen, which will need to be continued upon discharge  The patient admits to having a diet mostly consisting of soda and candy  She will need close follow-up primary care provider for diabetic management  Continue lisinopril for renal protection  4)  Vitamin D deficiency:  Continue Ergocalciferol  Follow-up with PCP for a repeat Vitamin D 25-OH level checked in 2 months  Subjective:   Patient seen and examined  The patient is doing better today, 11/08/2017    Her right 3rd toe pain has improved  Her right 3rd toe and right foot edema has also improved  Objective:     Vitals: Blood pressure 160/88, pulse (!) 114, temperature 97 6 °F (36 4 °C), temperature source Temporal, resp  rate 18, height 5' 8" (1 727 m), weight 116 kg (255 lb 8 2 oz), SpO2 93 %  ,Body mass index is 38 85 kg/m²    Weight (last 2 days)     Date/Time   Weight    11/08/17 0542  116 (255 51)    11/07/17 0600  116 (254 8)    11/06/17 0600  116 (256 62)              Intake/Output Summary (Last 24 hours) at 11/08/17 0959  Last data filed at 11/08/17 0930   Gross per 24 hour   Intake          1693 33 ml   Output              700 ml   Net           993 33 ml       Physical Exam: General:  NAD, awake, alert, oriented x 3  HEENT:  NC/AT, mucous membranes moist  Neck:  Supple, No JVP elevation  CV:  + S1, + S2, Tachycardic, regular rhythm  Pulm:  Lung fields are CTA bilaterally  Abd:  Soft, Non-tender, Non-distended  Ext:  No clubbing/cyanosis, Decreased erythema/edema of right 3rd toe and decreased erythema/edema of right foot    Scheduled Meds:  cefepime 2,000 mg Intravenous Q12H   ergocalciferol 50,000 Units Oral Weekly   insulin aspart protamine-insulin aspart 15 Units Subcutaneous BID AC   insulin lispro 1-6 Units Subcutaneous HS   insulin lispro 2-12 Units Subcutaneous TID AC   lisinopril 10 mg Oral Daily   vancomycin 17 5 mg/kg (Adjusted) Intravenous Q12H     Continuous Infusions:  sodium chloride 100 mL/hr Last Rate: 100 mL/hr (11/08/17 0505)     PRN Meds:   acetaminophen    hydrALAZINE    HYDROmorphone    ondansetron    oxyCODONE      Invasive Devices     Peripheral Intravenous Line            Peripheral IV 11/08/17 Right Forearm less than 1 day                  Results from last 7 days  Lab Units 11/08/17  0539 11/07/17  0502 11/06/17  0541   WBC Thousand/uL 8 84 7 54 8 84   HEMOGLOBIN g/dL 12 2 12 3 12 0   HEMATOCRIT % 36 8 36 5 34 9   PLATELETS Thousands/uL 291 271 269   NEUTROS PCT % 65 65 67   MONOS PCT % 6 7 6 Results from last 7 days  Lab Units 11/08/17  0539 11/07/17  0502 11/06/17  0541 11/05/17  1936   SODIUM mmol/L 140 139 136 135*   POTASSIUM mmol/L 3 2* 3 5 3 6 3 8   CHLORIDE mmol/L 103 104 100 98*   CO2 mmol/L 29 25 29 28   BUN mg/dL 8 6 11 16   CREATININE mg/dL 0 65 0 57* 0 73 0 90   CALCIUM mg/dL 8 6 8 4 8 5 9 5   TOTAL PROTEIN g/dL  --  7 1 7 4 8 2   BILIRUBIN TOTAL mg/dL  --  0 40 0 70 0 50   ALK PHOS U/L  --  102 119* 134*   ALT U/L  --  14 19 23   AST U/L  --  11 9 16   GLUCOSE RANDOM mg/dL 193* 216* 245* 292*       Lab Results   Component Value Date    CKTOTAL 63 11/06/2017    TROPONINI <0 02 11/06/2017       Lab Results   Component Value Date    INR 0 99 11/05/2017    PROTIME 12 9 11/05/2017                 Lab, Imaging and other studies: I have personally reviewed pertinent reports  VTE Pharmacologic Prophylaxis: RX contraindicated due to:  On hold for possible surgical procedure today, 11/8/17  VTE Mechanical Prophylaxis: sequential compression device

## 2017-11-08 NOTE — INTERIM OP NOTE
*Distal phalangectomy, NOT amputation*RIGHT THIRD TOE  Postoperative Note  PATIENT NAME: Tegan Bonilla  : 1955  MRN: [id number]  [facility] OR ROOM 02    Surgery Date: 2017    Preop Diagnosis:  1    Osteomyelitis distal phalanx RT 3rd toe    Procedure:  Distal phalangectomy RT 3rd toe    Surgeon(s) and Role:     * Ronel Parrish DPM - Primary    Specimens:  ID Type Source Tests Collected by Time Destination   1 : right third toe Tissue Toe, Right TISSUE EXAM Ronel Parrish DPM 2017 1426    A : bone culture right third toe Tissue Toe, Right CULTURE, TISSUE AND GRAM STAIN Ronel Parrish DPM 2017 1418        Estimated Blood Loss:   Minimal    Anesthesia Type:   MAC with local block with 15 cc 0 5% Marcaine plain    Findings:    Grossly infected distal phalanx with pathologic fracture  Complications:   None    SIGNATURE: Ronel Parrish DPM   DATE: 2017   TIME: 2:49 PM

## 2017-11-08 NOTE — ANESTHESIA PREPROCEDURE EVALUATION
Review of Systems/Medical History  Patient summary reviewed  Chart reviewed  No history of anesthetic complications     Cardiovascular  Hypertension ,    Pulmonary       GI/Hepatic  Negative GI/hepatic ROS               Endo/Other  Diabetes type 2 Insulin,      GYN       Hematology   Musculoskeletal  Obesity ,        Neurology  Negative neurology ROS      Psychology           Physical Exam    Airway    Mallampati score: II  TM Distance: >3 FB  Neck ROM: full     Dental       Cardiovascular  Cardiovascular exam normal    Pulmonary  Pulmonary exam normal     Other Findings  Many broken, missing and carious teeth  Anesthesia Plan  ASA Score- 3       Anesthesia Type- IV sedation with anesthesia with ASA Monitors  Additional Monitors:   Airway Plan:     Comment: Routine monitors, IV sedation  Questions answered, consent obtained          Induction- intravenous  Informed Consent- Anesthetic plan and risks discussed with patient  I personally reviewed this patient with the CRNA  Discussed and agreed on the Anesthesia Plan with the CRNA  Alex Mcdaniel

## 2017-11-08 NOTE — ANESTHESIA POSTPROCEDURE EVALUATION
Post-Op Assessment Note      CV Status:  Stable    Mental Status:  Alert and awake    Hydration Status:  Euvolemic    PONV Controlled:  Controlled    Airway Patency:  Patent    Post Op Vitals Reviewed: Yes          Staff: Anesthesiologist, CRNA       Comments: VSS, awake, maintains own airway            BP      Temp      Pulse     Resp      SpO2

## 2017-11-09 VITALS
DIASTOLIC BLOOD PRESSURE: 82 MMHG | RESPIRATION RATE: 20 BRPM | HEART RATE: 87 BPM | WEIGHT: 259.48 LBS | TEMPERATURE: 97.8 F | OXYGEN SATURATION: 96 % | SYSTOLIC BLOOD PRESSURE: 140 MMHG | BODY MASS INDEX: 39.33 KG/M2 | HEIGHT: 68 IN

## 2017-11-09 PROBLEM — R59.0 INGUINAL LYMPHADENOPATHY: Status: ACTIVE | Noted: 2017-11-09

## 2017-11-09 PROBLEM — E83.51 HYPOCALCEMIA: Status: ACTIVE | Noted: 2017-11-09

## 2017-11-09 PROBLEM — E83.42 HYPOMAGNESEMIA: Status: ACTIVE | Noted: 2017-11-09

## 2017-11-09 PROBLEM — M86.9 OSTEOMYELITIS OF RIGHT FOOT (HCC): Status: ACTIVE | Noted: 2017-11-09

## 2017-11-09 PROBLEM — E83.39 HYPERPHOSPHATEMIA: Status: ACTIVE | Noted: 2017-11-09

## 2017-11-09 LAB
ALBUMIN SERPL BCP-MCNC: 2.8 G/DL (ref 3.5–5)
ALP SERPL-CCNC: 100 U/L (ref 46–116)
ALT SERPL W P-5'-P-CCNC: 19 U/L (ref 12–78)
ANION GAP SERPL CALCULATED.3IONS-SCNC: 10 MMOL/L (ref 4–13)
AST SERPL W P-5'-P-CCNC: 16 U/L (ref 5–45)
BASOPHILS # BLD AUTO: 0.08 THOUSANDS/ΜL (ref 0–0.1)
BASOPHILS NFR BLD AUTO: 1 % (ref 0–1)
BILIRUB SERPL-MCNC: 0.3 MG/DL (ref 0.2–1)
BUN SERPL-MCNC: 10 MG/DL (ref 5–25)
CALCIUM SERPL-MCNC: 8.2 MG/DL (ref 8.3–10.1)
CHLORIDE SERPL-SCNC: 103 MMOL/L (ref 100–108)
CO2 SERPL-SCNC: 27 MMOL/L (ref 21–32)
CREAT SERPL-MCNC: 0.66 MG/DL (ref 0.6–1.3)
EOSINOPHIL # BLD AUTO: 0.18 THOUSAND/ΜL (ref 0–0.61)
EOSINOPHIL NFR BLD AUTO: 2 % (ref 0–6)
ERYTHROCYTE [DISTWIDTH] IN BLOOD BY AUTOMATED COUNT: 13.7 % (ref 11.6–15.1)
GFR SERPL CREATININE-BSD FRML MDRD: 95 ML/MIN/1.73SQ M
GLUCOSE SERPL-MCNC: 180 MG/DL (ref 65–140)
GLUCOSE SERPL-MCNC: 192 MG/DL (ref 65–140)
GLUCOSE SERPL-MCNC: 198 MG/DL (ref 65–140)
HCT VFR BLD AUTO: 35.4 % (ref 34.8–46.1)
HGB BLD-MCNC: 11.7 G/DL (ref 11.5–15.4)
LACTATE SERPL-SCNC: 1 MMOL/L (ref 0.5–2)
LYMPHOCYTES # BLD AUTO: 1.88 THOUSANDS/ΜL (ref 0.6–4.47)
LYMPHOCYTES NFR BLD AUTO: 21 % (ref 14–44)
MAGNESIUM SERPL-MCNC: 1.5 MG/DL (ref 1.6–2.6)
MCH RBC QN AUTO: 28.5 PG (ref 26.8–34.3)
MCHC RBC AUTO-ENTMCNC: 33.1 G/DL (ref 31.4–37.4)
MCV RBC AUTO: 86 FL (ref 82–98)
MONOCYTES # BLD AUTO: 0.6 THOUSAND/ΜL (ref 0.17–1.22)
MONOCYTES NFR BLD AUTO: 7 % (ref 4–12)
NEUTROPHILS # BLD AUTO: 6.12 THOUSANDS/ΜL (ref 1.85–7.62)
NEUTS SEG NFR BLD AUTO: 69 % (ref 43–75)
PHOSPHATE SERPL-MCNC: 4.4 MG/DL (ref 2.3–4.1)
PLATELET # BLD AUTO: 289 THOUSANDS/UL (ref 149–390)
PMV BLD AUTO: 9.5 FL (ref 8.9–12.7)
POTASSIUM SERPL-SCNC: 3.8 MMOL/L (ref 3.5–5.3)
PROT SERPL-MCNC: 6.3 G/DL (ref 6.4–8.2)
RBC # BLD AUTO: 4.1 MILLION/UL (ref 3.81–5.12)
SODIUM SERPL-SCNC: 140 MMOL/L (ref 136–145)
WBC # BLD AUTO: 8.86 THOUSAND/UL (ref 4.31–10.16)

## 2017-11-09 PROCEDURE — 82948 REAGENT STRIP/BLOOD GLUCOSE: CPT

## 2017-11-09 PROCEDURE — 80053 COMPREHEN METABOLIC PANEL: CPT | Performed by: INTERNAL MEDICINE

## 2017-11-09 PROCEDURE — 85025 COMPLETE CBC W/AUTO DIFF WBC: CPT | Performed by: INTERNAL MEDICINE

## 2017-11-09 PROCEDURE — 84100 ASSAY OF PHOSPHORUS: CPT | Performed by: INTERNAL MEDICINE

## 2017-11-09 PROCEDURE — 83735 ASSAY OF MAGNESIUM: CPT | Performed by: INTERNAL MEDICINE

## 2017-11-09 PROCEDURE — 83605 ASSAY OF LACTIC ACID: CPT | Performed by: INTERNAL MEDICINE

## 2017-11-09 RX ORDER — OXYCODONE HYDROCHLORIDE 5 MG/1
5 TABLET ORAL EVERY 4 HOURS PRN
Qty: 10 TABLET | Refills: 0 | Status: SHIPPED | OUTPATIENT
Start: 2017-11-09 | End: 2017-11-19

## 2017-11-09 RX ORDER — ERGOCALCIFEROL 1.25 MG/1
50000 CAPSULE ORAL WEEKLY
Qty: 7 CAPSULE | Refills: 0 | Status: ON HOLD | OUTPATIENT
Start: 2017-11-14 | End: 2021-01-24 | Stop reason: CLARIF

## 2017-11-09 RX ORDER — MAGNESIUM SULFATE HEPTAHYDRATE 40 MG/ML
2 INJECTION, SOLUTION INTRAVENOUS ONCE
Status: COMPLETED | OUTPATIENT
Start: 2017-11-09 | End: 2017-11-09

## 2017-11-09 RX ORDER — POTASSIUM CHLORIDE 20 MEQ/1
20 TABLET, EXTENDED RELEASE ORAL ONCE
Status: COMPLETED | OUTPATIENT
Start: 2017-11-09 | End: 2017-11-09

## 2017-11-09 RX ORDER — LISINOPRIL 10 MG/1
10 TABLET ORAL DAILY
Qty: 30 TABLET | Refills: 0 | Status: SHIPPED | OUTPATIENT
Start: 2017-11-10 | End: 2021-01-26 | Stop reason: HOSPADM

## 2017-11-09 RX ORDER — ACETAMINOPHEN 325 MG/1
650 TABLET ORAL EVERY 6 HOURS PRN
Qty: 30 TABLET | Refills: 0
Start: 2017-11-09

## 2017-11-09 RX ORDER — CEPHALEXIN 500 MG/1
500 CAPSULE ORAL 4 TIMES DAILY
Qty: 28 CAPSULE | Refills: 0 | Status: SHIPPED | OUTPATIENT
Start: 2017-11-09 | End: 2017-11-16

## 2017-11-09 RX ORDER — CEPHALEXIN 250 MG/1
500 CAPSULE ORAL 4 TIMES DAILY
Status: DISCONTINUED | OUTPATIENT
Start: 2017-11-09 | End: 2017-11-09 | Stop reason: HOSPADM

## 2017-11-09 RX ADMIN — LISINOPRIL 10 MG: 10 TABLET ORAL at 08:07

## 2017-11-09 RX ADMIN — CEFEPIME 2000 MG: 2 INJECTION, POWDER, FOR SOLUTION INTRAMUSCULAR; INTRAVENOUS at 08:01

## 2017-11-09 RX ADMIN — POTASSIUM CHLORIDE 20 MEQ: 1500 TABLET, EXTENDED RELEASE ORAL at 12:05

## 2017-11-09 RX ADMIN — MAGNESIUM SULFATE IN WATER 2 G: 40 INJECTION, SOLUTION INTRAVENOUS at 12:05

## 2017-11-09 RX ADMIN — OXYCODONE HYDROCHLORIDE 5 MG: 5 TABLET ORAL at 07:47

## 2017-11-09 RX ADMIN — INSULIN LISPRO 2 UNITS: 100 INJECTION, SOLUTION INTRAVENOUS; SUBCUTANEOUS at 12:07

## 2017-11-09 RX ADMIN — HEPARIN SODIUM 5000 UNITS: 5000 INJECTION, SOLUTION INTRAVENOUS; SUBCUTANEOUS at 05:09

## 2017-11-09 RX ADMIN — OXYCODONE HYDROCHLORIDE 5 MG: 5 TABLET ORAL at 12:07

## 2017-11-09 RX ADMIN — INSULIN LISPRO 2 UNITS: 100 INJECTION, SOLUTION INTRAVENOUS; SUBCUTANEOUS at 07:46

## 2017-11-09 RX ADMIN — SODIUM CHLORIDE 100 ML/HR: 0.9 INJECTION, SOLUTION INTRAVENOUS at 06:18

## 2017-11-09 RX ADMIN — INSULIN ASPART 15 UNITS: 100 INJECTION, SUSPENSION SUBCUTANEOUS at 07:46

## 2017-11-09 NOTE — DISCHARGE SUMMARY
Discharge Summary - Tiburcio Tuttle 58 y o  female MRN: [id number]    Unit/Bed#: 409-01 Encounter: [id number]    Admission Date: 11/5/2017     Admitting Diagnosis: Diabetes ([facility] ) [E11 9]  Osteomyelitis ([facility] [address] ) [M86 9]  Foot swelling [M79 89]    HPI:  Please refer to the H/P for complete admission details  Procedures Performed: No orders of the defined types were placed in this encounter  Hospital Course/Significant Findings, Care, Treatment and Services Provided:  The patient is a 58year-old female who presented to the emergency department with the complaint of a right 3rd toe infection  CT scan of the right foot without contrast was completed on 11/05/2017 with following results:  FINDINGS:     OSSEOUS STRUCTURES:  No fracture or dislocation  There is an ulcer of the very distal aspect of the 3rd toe which approaches but does not appear to contact the distal aspect of the 3rd distal phalanx  Mild irregularity of the underlying 3rd distal   phalanx may indicate osteomyelitis  Consider MRI follow-up      VISUALIZED MUSCULATURE:  Unremarkable      SOFT TISSUES:  No definite abscess      OTHER PERTINENT FINDINGS:  Moderate diffuse midfoot degenerative changes  Diffuse soft tissue swelling         IMPRESSION:     There is an ulcer of the very distal aspect of the 3rd toe which approaches but does not appear to contact the distal aspect of the 3rd distal phalanx  Mild irregularity of the underlying 3rd distal phalanx may indicate osteomyelitis  Consider MRI   follow-up  She was initiated on IV cefepime and IV vancomycin    Cultures were completed with the following results:  Procedure Component Value - Date/Time   Culture, tissue and Gram stain [[id number]] Collected: 11/08/17 1418   Lab Status: Preliminary result Specimen: Tissue from Toe, Right Updated: 11/09/17 1214    Tissue Culture Culture too young- will reincubate    Gram Stain Result No polys seen     No bacteria seen   Wound culture and Gram stain [[id number]] (Abnormal) Collected: 11/06/17 1013   Lab Status: Final result Specimen: Wound from Toe, Right Updated: 11/08/17 0723    Wound Culture 2+ Growth of Beta Hemolytic Streptococcus Group B (A)    Comment: This organism is intrinisically susceptible to Penicillin  If sensitivites to other antibiotics are required, please call the Microbiology Department at [phone] within 5 days  1+ Growth of     Comment: Mixed Skin Eboni       Gram Stain Result No polys seen     1+ Gram positive cocci in pairs   Urine culture [[id number]] Collected: 11/06/17 0405   Lab Status: Final result Specimen: Urine from Urine, Clean Catch Updated: 11/07/17 0803    Urine Culture No Growth <1000 cfu/mL   MRSA culture [[id number]] (Normal) Collected: 11/05/17 2029   Lab Status: Final result Specimen: Nares from Nose Updated: 11/07/17 1452    MRSA Culture Only No Methicillin Resistant Staphlyococcus aureus (MRSA) isolated   Blood culture #1 [[id number]] Collected: 11/05/17 1936   Lab Status: Preliminary result Specimen: Blood from Arm, Right Updated: 11/09/17 0801    Blood Culture No Growth at 72 hrs  Blood culture #2 [[id number]] Collected: 11/05/17 1932   Lab Status: Preliminary result Specimen: Blood from Hand, Right Updated: 11/09/17 0801    Blood Culture No Growth at 72 hrs  She was seen in consultation by Dr Howie José of Podiatry  Venous duplex of bilateral lower extremities was completed on 11/07/2017 with the following results:  CONCLUSION:     Impression:  RIGHT LOWER LIMB:  No evidence of acute or chronic deep vein thrombosis  No evidence of superficial thrombophlebitis noted  Doppler evaluation shows a normal response to augmentation maneuvers  Popliteal, posterior tibial and anterior tibial arterial Doppler waveforms are  triphasic  LEFT LOWER LIMB:  No evidence of acute or chronic deep vein thrombosis  No evidence of superficial thrombophlebitis noted    Doppler evaluation shows a normal response to augmentation maneuvers  Popliteal, posterior tibial and anterior tibial arterial Doppler waveforms are  triphasic  Tech Note: There are multiple echogenic structures located in the right and  left inguinal regions  The largest structure in the right groin measures  approximately 4 4x1 6 cm and is consistent with enlarged lymph node and  channels  Tech note: Preliminary report given to nurse Hossein Edgar immediately following the  exam     She will need to repeat ultrasound of bilateral inguinal area to ensure resolution of her bilateral inguinal lymphadenopathy  This test will need to be completed in approximately in 1 month by her primary care provider  The patient refused an MRI secondary to claustrophobia  A nuclear medicine Ceretec white blood cell tagged scan was completed on 11/08/2017 with the following results:  FINDINGS: Focal increased radiotracer uptake is noted at the right foot 3rd toe distally on the early images  This persists on the 22 hour delayed images  Findings suspicious for infection and underlying osteomyelitis of the 3rd toe distal phalanx   given the CT findings      There is no abnormal uptake in the left foot      IMPRESSION:  1  Findings suspicious for infection and underlying osteomyelitis of the 3rd toe distal phalanx  Patient underwent a distal phalangectomy on 11/08/2017 by Dr Bin Landeros of Podiatry  A post-operative right foot x-ray was completed on 11/08/2017 with the following results:  FINDINGS:     Patient is status post surgery of the distal third digit for osteomyelitis  There is no distal phalanx identified  Third digit soft tissue swelling noted      No acute fracture  Degenerative osteoarthritis involving the third, fourth and fifth tarsometatarsal joints   Large plantar calcaneal spur and small retrocalcaneal spur      The remaining soft tissues are unremarkable      IMPRESSION:     Status post removal of the distal phalanx of the third digit for osteomyelitis with surrounding soft tissue swelling  She was transitioned to p o  cephalexin 500 milligrams p o  q i d  for a 7-day antibiotic course upon discharge  She does have a penicillin allergy, but she tolerated IV cefepime well during the hospitalization  She was found to have accelerated hypertension was placed on lisinopril 10 milligrams p o  daily, which will be continued after discharge  This will also give her renal protection in the setting of diabetes mellitus  She was found to have uncontrolled type 2 diabetes mellitus  A hemoglobin A1c was completed with the following results:  Results for Joselito Jessica (MRN [id number]) as of 11/9/2017 14:33   Ref  Range 11/6/2017 05:41   Hemoglobin A1C Latest Ref Range: 4 2 - 6 3 % 12 0 (H)   EAG Latest Units: mg/dl 298     She had a previous intolerance to the p o  diabetic medications in the past   She was initiated on insulin therapy  She was discharged home on Novolin 70/30, 15 units b i d  before meals  She will need close follow-up with the primary care provider for diabetic management  She will need a yearly ophthalmology examination and routine follow-up Podiatry  I also instructed the patient to have a routine checkup with Ob/Gyn for health maintenance  She needs age-appropriate cancer screening including a colonoscopy  She was found to have vitamin D deficiency with following vitamin D 25-OH level result:  Results for Joselito Jessica (MRN [id number]) as of 11/9/2017 14:33   Ref  Range 11/6/2017 05:41   Vit D, 25-Hydroxy Latest Ref Range: 30 0 - 100 0 ng/mL 9 7 (L)     For the vitamin D deficiency, the patient was placed on Ergocalciferol 50,000 I  U  PO Qweekly for 8 weekly doses  She will then need a repeat Vitamin D 25-OH level checked in 2 months and will need a daily Cholecalciferol supplement prescribed by her PCP based on the repeat Vitamin D 25-OH level results    She was found to have hypomagnesemia, and she received IV magnesium sulfate supplementation  She will have repeat blood work including a magnesium level completed next week as an outpatient  She was cleared for discharge by Dr Christina Womack of Podiatry  She was discharged home in hemodynamically stable condition  Complications:  None    Discharge Diagnosis:  Patient Active Problem List   Diagnosis    Hyponatremia    Elevated alkaline phosphatase level    Cellulitis of third toe, right    Skin ulcer of third toe of right foot ([facility] [address] )    Type 2 diabetes mellitus with hyperglycemia (HCC)    Elevated sed rate    Essential hypertension    Vitamin D deficiency    Hyperphosphatemia    Hypomagnesemia    Hypocalcemia    Inguinal lymphadenopathy    Osteomyelitis of right foot ([facility] [address] [facility] )       Results from last 7 days  Lab Units 11/09/17  0456 11/08/17  0539 11/07/17  0502   WBC Thousand/uL 8 86 8 84 7 54   HEMOGLOBIN g/dL 11 7 12 2 12 3   HEMATOCRIT % 35 4 36 8 36 5   PLATELETS Thousands/uL 289 291 271         Results from last 7 days  Lab Units 11/09/17  0456 11/08/17  0539 11/07/17  0502   SODIUM mmol/L 140 140 139   POTASSIUM mmol/L 3 8 3 2* 3 5   CHLORIDE mmol/L 103 103 104   CO2 mmol/L 27 29 25   BUN mg/dL 10 8 6   CREATININE mg/dL 0 66 0 65 0 57*   GLUCOSE RANDOM mg/dL 198* 193* 216*   CALCIUM mg/dL 8 2* 8 6 8 4         Results from last 7 days  Lab Units 11/09/17  0456 11/07/17  0502 11/06/17  0541   ALK PHOS U/L 100 102 119*   BILIRUBIN TOTAL mg/dL 0 30 0 40 0 70   TOTAL PROTEIN g/dL 6 3* 7 1 7 4   ALT U/L 19 14 19   AST U/L 16 11 9       Results from last 7 days  Lab Units 11/09/17  0456 11/06/17  0541   MAGNESIUM mg/dL 1 5* 1 6       Condition at Discharge: stable     Discharge instructions/Information to patient and family:   See after visit summary for information provided to patient and family  Provisions for Follow-Up Care:  See after visit summary for information related to follow-up care and any pertinent home health orders        Disposition: Home    Planned Readmission: No    Discharge Statement   I spent 45 minutes discharging the patient  This time was spent on the day of discharge  I had direct contact with the patient on the day of discharge  Additional documentation is required if more than 30 minutes were spent on discharge  Discharge Medications:  See after visit summary for reconciled discharge medications provided to patient and family

## 2017-11-09 NOTE — PLAN OF CARE
Problem: DISCHARGE PLANNING - CARE MANAGEMENT  Goal: Discharge to post-acute care or home with appropriate resources  INTERVENTIONS:  - Conduct assessment to determine patient/family and health care team treatment goals, and need for post-acute services based on payer coverage, community resources, and patient preferences, and barriers to discharge  - Address psychosocial, clinical, and financial barriers to discharge as identified in assessment in conjunction with the patient/family and health care team  - Arrange appropriate level of post-acute services according to patient's   needs and preference and payer coverage in collaboration with the physician and health care team  - Communicate with and update the patient/family, physician, and health care team regarding progress on the discharge plan  - Arrange appropriate transportation to post-acute venues   Outcome: Completed Date Met: 11/09/17  -[place] home with outpatient follow up with PCP and podiatry

## 2017-11-09 NOTE — PROGRESS NOTES
Progress Note - Podiatry/Wound   Megha Nguyễn 58 y o  female MRN: [id number]  Unit/Bed#: 409-01 Encounter: [id number]      Assessment:   1  POD #1 S/P distal phalangectomy secondary to osteomyelitis  2  Resolving cellulitis RT 3rd toe    Plan:   Can discontinue IV antibiotics and send home on PO Keflex x 1 week (culture + for GBS)  Expect surgical cure with phalangectomy  Will follow-up in my office next Weds  Told to minimize ambulation, WB in surgical shoe only  Subjective:  Patient feels well  Minimal pain  Objective:    Vitals: Blood pressure 151/85, pulse 80, temperature 98 9 °F (37 2 °C), temperature source Temporal, resp  rate 18, height 5' 8" (1 727 m), weight 118 kg (259 lb 7 7 oz), SpO2 93 %  ,Body mass index is 39 45 kg/m²  Physical Exam:     Right 3rd toe - No acute drainage from incision site which is well coapted  Minimal remaining erythema to digit, no erythema and edema remaining to right foot  No necrtotic changes noted  Pedal pulses palpable  Lab, Imaging and other studies: I have personally reviewed pertinent reports

## 2017-11-09 NOTE — DISCHARGE INSTRUCTIONS
PLEASE DISPOSE OF YOUR INSULIN NEEDLES/SYRINGES, LANCETS USED TO CHECK YOUR BLOOD SUGARS, AND TEST STRIPS IN A PROPER NEEDLE/SHARPS CONTAINER  ALTERNATIVELY, YOU CAN USE AN EMPTY LAUNDRY DETERGENT BOTTLE  WHEN THE BOTTLE IS FULL, SEAL IT WITH THE LID, AND WRAP IT COMPLETELY IN DUCT TAPE PRIOR TO THROWING IT IN THE GARBAGE  Type 2 Diabetes in Adults   WHAT YOU NEED TO KNOW:   Type 2 diabetes is a disease that affects how your body uses glucose (sugar)  Normally, when the blood sugar level increases, the pancreas makes more insulin  Insulin helps move sugar out of the blood so it can be used for energy  Type 2 diabetes develops because either the body cannot make enough insulin, or it cannot use the insulin correctly  After many years, your pancreas may stop making insulin  DISCHARGE INSTRUCTIONS:   Call 911 for any of the following:   · You have any of the following signs of a stroke:      ¨ Numbness or drooping on one side of your face     ¨ Weakness in an arm or leg    ¨ Confusion or difficulty speaking    ¨ Dizziness, a severe headache, or vision loss    · You have any of the following signs of a heart attack:      ¨ Squeezing, pressure, or pain in your chest that lasts longer than 5 minutes or returns    ¨ Discomfort or pain in your back, neck, jaw, stomach, or arm     ¨ Trouble breathing    ¨ Nausea or vomiting    ¨ Lightheadedness or a sudden cold sweat, especially with chest pain or trouble breathing  Seek care immediately if:   · You have severe abdominal pain, or the pain spreads to your back  You may also be vomiting  · You have trouble staying awake or focusing  · You are shaking or sweating  · You have blurred or double vision  · Your breath has a fruity, sweet smell  · Your breathing is deep and labored, or rapid and shallow  · Your heartbeat is fast and weak  Contact your healthcare provider if:   · You are vomiting or have diarrhea       · You have an upset stomach and cannot eat the foods on your meal plan  · You feel weak or more tired than usual      · You feel dizzy, have headaches, or are easily irritated  · Your skin is red, warm, dry, or swollen  · You have a wound that does not heal      · You have numbness in your arms or legs  · You have trouble coping with your illness, or you feel anxious or depressed  · You have questions or concerns about your condition or care  Medicines: You may  need any of the following:  · Hypoglycemic medicines or insulin  may be given to decrease the amount of sugar in your blood  · Blood pressure medicine  may be given to lower your blood pressure  Your blood pressure should be less than 140/90  · Cholesterol lowering medicine  may be given to prevent heart disease  · Antiplatelets , such as aspirin, help prevent blood clots  Take your antiplatelet medicine exactly as directed  These medicines make it more likely for you to bleed or bruise  If you are told to take aspirin, do not take acetaminophen or ibuprofen instead  · Take your medicine as directed  Contact your healthcare provider if you think your medicine is not helping or if you have side effects  Tell him or her if you are allergic to any medicine  Keep a list of the medicines, vitamins, and herbs you take  Include the amounts, and when and why you take them  Bring the list or the pill bottles to follow-up visits  Carry your medicine list with you in case of an emergency  Check your blood sugar level as directed: You will be taught how to use a glucose monitor  Ask your healthcare provider when and how often to check during the day  You will need to check your blood sugar level at least 3 times each day if you are on insulin  If you check your blood sugar level before a meal , it should be between 80 and 130 mg/dL  If you check your blood sugar level 1 to 2 hours after a meal , it should be less than 180 mg/dL   Ask your healthcare provider if these are good goals for you  Write down your results, and show them to your healthcare provider  He may use the results to make changes to your medicine, food, or exercise schedules  If your blood sugar level is too low: Your blood sugar level is too low if it goes below 70 mg/dL  If the level is too low, eat or drink 15 grams of fast-acting carbohydrate  These are found naturally in fruits  Fast-acting carbohydrates will raise your blood sugar level quickly  Examples of 15 grams of fast-acting carbohydrate are 4 ounces (½ cup) of fruit juice or 4 ounces of regular soda  Other examples are 2 tablespoons of raisins or 3 to 4 glucose tablets  Check your blood sugar level 15 minutes later  If the level is still low (less than 100 mg/dL), eat another 15 grams of carbohydrate  When the level returns to 100 mg/dL, eat a snack or meal that contains carbohydrates  This will help prevent another drop in blood sugar  Always carefully follow your healthcare provider's instructions on how to treat low blood sugar levels  Wear medical alert identification:  Wear medical alert jewelry or carry a card that says you have diabetes  Ask your healthcare provider where to get these items  Check your feet each day for sores:  Wear shoes and socks that fit correctly  Do not trim your toenails  Ask your healthcare provider for more information about foot care  Maintain a healthy weight:  Ask your healthcare provider how much you should weigh  A healthy weight can help you control your diabetes  Ask your provider to help you create a weight loss plan if you are overweight  Together you can set manageable weight loss goals  Follow your meal plan:  A dietitian will help you make a meal plan to keep your blood sugar level steady  Do not skip meals  Your blood sugar level may drop too low if you have taken diabetes medicine and do not eat  · Keep track of carbohydrates (sugar and starchy foods)    Your blood sugar level can get too high if you eat too many carbohydrates  Your dietitian will help you plan meals and snacks that have the right amount of carbohydrates  · Eat low-fat foods , such as skinless chicken and low-fat milk  · Eat less sodium (salt)  Limit high-sodium foods, such as soy sauce, potato chips, and soup  Do not add salt to food you cook  Limit your use of table salt  You should have less than 2,300 mg of sodium per day  · Eat high-fiber foods , such as vegetables, whole grain breads, and beans  · Limit alcohol  Alcohol affects your blood sugar level and can make it harder to manage your diabetes  Limit alcohol to 1 drink a day if you are a woman  Limit alcohol to 2 drinks a day if you are a man  A drink of alcohol is 12 ounces of beer, 5 ounces of wine, or 1½ ounces of liquor  Exercise as directed:  Exercise can help keep your blood sugar level steady, decrease your risk of heart disease, and help you lose weight  Stretch before and after you exercise  Exercise for at least 150 minutes every week  Spread this amount of exercise over at least 3 days a week  Do not skip exercise more than 2 days in a row  Include muscle strengthening activities 2 to 3 days each week  Older adults should include balance training 2 to 3 times each week  Activities that help increase balance include yoga and lesa chi  Work with your healthcare provider to create an exercise plan  · Check your blood sugar level before and after exercise  Healthcare providers may tell you to change the amount of insulin you take or food you eat  If your blood sugar level is high, check your blood or urine for ketones before you exercise  Do not exercise if your blood sugar level is high and you have ketones  · If your blood sugar level is less than 100 mg/dL, have a carbohydrate snack before you exercise  Examples are 4 to 6 crackers, ½ banana, 8 ounces (1 cup) of milk, or 4 ounces (½ cup) of juice   Drink water or liquids that do not contain sugar before, during, and after exercise  Ask your dietitian or healthcare provider which liquids you should drink when you exercise  · Do not sit for longer than 30 minutes  If you cannot walk around, at least stand up  This will help you stay active and keep your blood circulating  Do not smoke:  Nicotine and other chemicals in cigarettes and cigars can cause lung damage and make it more difficult to manage your diabetes  Ask your healthcare provider for information if you currently smoke and need help to quit  Do not use e-cigarettes or smokeless tobacco in place of cigarettes or to help you quit  They still contain nicotine  Check your blood pressure as directed:  Ask your healthcare provider what your blood pressure should be  Most adults with diabetes and high blood pressure should have a systolic blood pressure (first number) less than 140  Your diastolic blood pressure (second number) should be less than 90  Ask about vaccines: You have a higher risk for serious illness if you get the flu, pneumonia, or hepatitis  Ask your healthcare provider if you should get a flu, pneumonia, or hepatitis B vaccine, and when to get the vaccine  Follow up with your healthcare provider as directed: You may need to return to have your A1c checked every 3 months  You will need to return at least once each year to have your feet checked  You will need an eye exam once a year to check for retinopathy  You will also need urine tests every year to check for kidney problems  You may need tests to monitor for heart disease such as an EKG, stress test, blood pressure monitoring, and blood tests  Write down your questions so you remember to ask them during your visits  © 2017 [address] Information is for End User's use only and may not be sold, redistributed or otherwise used for commercial purposes   All illustrations and images included in CareNotes® are the copyrighted property of [employer]  or Parag Bernal  The above information is an  only  It is not intended as medical advice for individual conditions or treatments  Talk to your doctor, nurse or pharmacist before following any medical regimen to see if it is safe and effective for you  Type 2 Diabetes in Adults   WHAT YOU NEED TO KNOW:   What is type 2 diabetes? Type 2 diabetes is a disease that affects how your body uses glucose (sugar)  Normally, when the blood sugar level increases, the pancreas makes more insulin  Insulin helps move sugar out of the blood so it can be used for energy  Type 2 diabetes develops because either the body cannot make enough insulin, or it cannot use the insulin correctly  After many years, your pancreas may stop making insulin  What increases my risk for type 2 diabetes? · Obesity    · Physical inactivity    · Older age    · High blood pressure or high cholesterol    · A history of heart disease, gestational diabetes, or polycystic ovary syndrome     · A family member with diabetes    · Being [place] American, , , [place] [employer], or [facility]  What are the signs and symptoms of type 2 diabetes? You may have high blood sugar levels for a long time before symptoms appear  You may have any of the following:  · More hunger or thirst than usual     · Frequent urination     · Weight loss without trying     · Blurred vision  How is type 2 diabetes diagnosed? You may need tests to check for type 2 diabetes starting at age 39  You may need any of the following:  · An A1c test  shows the average amount of sugar in your blood over the past 2 to 3 months  Your healthcare provider will tell you the A1c level that is right for you  The goal for your A1c is usually below 7%  Your provider can help you make changes if a check shows the A1c is too high      · A fasting plasma glucose test  is when your blood sugar level is tested after you have not eaten for 8 hours  · A 2-hour plasma glucose test  starts with a blood sugar level check after you have not eaten for 8 hours  You are then given a glucose drink  Your blood sugar level is checked after 2 hours  · A random glucose test  may be done any time of day, no matter how long ago you ate  How is type 2 diabetes treated? Type 2 diabetes can be controlled to prevent damage to your heart, blood vessels, and other organs  The goal is to keep your blood sugar at a normal level  You must eat the right foods, and exercise regularly  You may need 1 or more hypoglycemic medicines or insulin if you cannot control your blood sugar level with nutrition and exercise  You may also need medicine to lower your risk for heart disease  An example includes medicine to lower or control your cholesterol  How do I check my blood sugar level? You will be taught how to check a small drop of blood in a glucose monitor  You will need to check your blood sugar level at least 3 times each day if you are on insulin  Ask your healthcare provider when and how often to check during the day  If you check your blood sugar level before a meal , it should be between 80 and 130 mg/dL  If you check your blood sugar level 1 to 2 hours after a meal , it should be less than 180 mg/dL  Ask your healthcare provider if these are good goals for you  Write down your results, and show them to your healthcare provider  Your provider may use the results to make changes to your medicine, food, and exercise schedules  What should I do if my blood sugar level is too low? Your blood sugar level is too low if it goes below 70 mg/dL  If the level is too low, eat or drink 15 grams of fast-acting carbohydrate  These are found naturally in fruits  Fast-acting carbohydrates will raise your blood sugar level quickly  Examples of 15 grams of fast-acting carbohydrate are 4 ounces (½ cup) of fruit juice or 4 ounces of regular soda   Other examples are 2 tablespoons of raisins or 3 to 4 glucose tablets  Check your blood sugar level 15 minutes later  If the level is still low (less than 100 mg/dL), eat another 15 grams of carbohydrate  When the level returns to 100 mg/dL, eat a snack or meal that contains carbohydrates  This will help prevent another drop in blood sugar  Always carefully follow your healthcare provider's instructions on how to treat low blood sugar levels  What do I need to know about nutrition? A dietitian will help you make a meal plan to keep your blood sugar level steady  Do not skip meals  Your blood sugar level may drop too low if you have taken diabetes medicine and do not eat  · Keep track of carbohydrates (sugar and starchy foods)  Your blood sugar level can get too high if you eat too many carbohydrates  Eat fruits, legumes, vegetables, and whole grains  Your dietitian will help you plan meals and snacks that have the right amount of carbohydrates  · Eat low-fat foods , such as skinless chicken and low-fat milk  · Eat less sodium (salt)  Limit high-sodium foods, such as soy sauce, potato chips, and soup  Do not add salt to food you cook  Limit your use of table salt  You should have less than 2,300 mg of sodium per day  · Eat high-fiber foods , such as vegetables, whole-grain breads, and beans  · Limit alcohol  Alcohol affects your blood sugar level and can make it harder to manage your diabetes  Limit alcohol to 1 drink a day if you are a woman  Limit alcohol to 2 drinks a day if you are a man  A drink of alcohol is 12 ounces of beer, 5 ounces of wine, or 1½ ounces of liquor  How much exercise do I need? Exercise can help keep your blood sugar level steady, decrease your risk of heart disease, and help you lose weight  Stretch before and after you exercise  Exercise for at least 150 minutes every week  Spread this amount of exercise over at least 3 days a week   Do not skip exercise more than 2 days in a row  Include muscle strengthening activities 2 to 3 days each week  Older adults should include balance training 2 to 3 times each week  Activities that help increase balance include yoga and lesa chi  Work with your healthcare provider to create an exercise plan  · Check your blood sugar level before and after exercise  Healthcare providers may tell you to change the amount of insulin you take or food you eat  If your blood sugar level is high, check your blood or urine for ketones before you exercise  Do not exercise if your blood sugar level is high and you have ketones  · If your blood sugar level is less than 100 mg/dL, have a carbohydrate snack before you exercise  Examples are 4 to 6 crackers, ½ banana, 8 ounces (1 cup) of milk, or 4 ounces (½ cup) of juice  Drink water or liquids that do not contain sugar before, during, and after exercise  Ask your dietitian or healthcare provider which liquids you should drink when you exercise  · Do not sit for longer than 30 minutes  If you cannot walk around, at least stand up  This will help you stay active and keep your blood circulating  What else can I do to manage type 2 diabetes? · Check your feet each day for sores  Wear shoes and socks that fit correctly  Do not trim your toenails  Ask your healthcare provider for more information about foot care  · Maintain a healthy weight  Ask your healthcare provider how much you should weigh  A healthy weight can help you control your diabetes and prevent heart disease  Ask your provider to help you create a weight loss plan if you are overweight  Together you can set manageable weight loss goals  · Do not smoke  Nicotine and other chemicals in cigarettes and cigars can cause lung damage and make it more difficult to manage your diabetes  Ask your healthcare provider for information if you currently smoke and need help to quit   Do not use e-cigarettes or smokeless tobacco in place of cigarettes or to help you quit  They still contain nicotine  · Check your blood pressure as directed  Ask your healthcare provider what your blood pressure should be  Most adults with diabetes and high blood pressure should have a systolic blood pressure (first number) less than 140  Your diastolic blood pressure (second number) should be less than 90  · Wear medical alert identification  Wear medical alert jewelry or carry a card that says you have diabetes  Ask your healthcare provider where to get these items  · Ask about vaccines  You have a higher risk for serious illness if you get the flu, pneumonia, or hepatitis  Ask your healthcare provider if you should get a flu, pneumonia, or hepatitis B vaccine, and when to get the vaccine  What are the risks of type 2 diabetes? Uncontrolled diabetes can damage your nerves, veins, and arteries  High blood sugar levels may damage other body tissue and organs over time  Damage to arteries may increase your risk for heart attack and stroke  Nerve damage may also lead to other heart, stomach, and nerve problems  Diabetes is life-threatening if it is not controlled  Control your blood glucose levels to prevent health problems  Call 911 for any of the following:   · You have any of the following signs of a stroke:      ¨ Numbness or drooping on one side of your face     ¨ Weakness in an arm or leg    ¨ Confusion or difficulty speaking    ¨ Dizziness, a severe headache, or vision loss    · You have any of the following signs of a heart attack:      ¨ Squeezing, pressure, or pain in your chest that lasts longer than 5 minutes or returns    ¨ Discomfort or pain in your back, neck, jaw, stomach, or arm     ¨ Trouble breathing    ¨ Nausea or vomiting    ¨ Lightheadedness or a sudden cold sweat, especially with chest pain or trouble breathing  When should I seek immediate care? · You have severe abdominal pain, or the pain spreads to your back   You may also be vomiting  · You have trouble staying awake or focusing  · You are shaking or sweating  · You have blurred or double vision  · Your breath has a fruity, sweet smell  · Your breathing is deep and labored, or rapid and shallow  · Your heartbeat is fast and weak  When should I contact my healthcare provider? · You are vomiting or have diarrhea  · You have an upset stomach and cannot eat the foods on your meal plan  · You feel weak or more tired than usual      · You feel dizzy, have headaches, or are easily irritated  · Your skin is red, warm, dry, or swollen  · You have a wound that does not heal      · You have numbness in your arms or legs  · You have trouble coping with your illness, or you feel anxious or depressed  · You have questions or concerns about your condition or care  CARE AGREEMENT:   You have the right to help plan your care  Learn about your health condition and how it may be treated  Discuss treatment options with your caregivers to decide what care you want to receive  You always have the right to refuse treatment  The above information is an  only  It is not intended as medical advice for individual conditions or treatments  Talk to your doctor, nurse or pharmacist before following any medical regimen to see if it is safe and effective for you  © 2017 [address] Information is for End User's use only and may not be sold, redistributed or otherwise used for commercial purposes  All illustrations and images included in CareNotes® are the copyrighted property of [employer]  or [employer]

## 2017-11-09 NOTE — SOCIAL WORK
Discussed with patient preferences on discharge;understanding how to manage health at home; purpose of taking medications; importance of follow up care/appointments; and symptoms to watch out for once discharged home  Pt already has an appointment scheduled with Dr Jennifer Larkin for next Wednesday  Re: PCP follow up with Dr Alberta Bowman, pt prefers to schedule herself  Pt's sister will be coming to pick her up after work today

## 2017-11-09 NOTE — NURSING NOTE
Instructions and scripts given to patient  Patient verbalized understanding of instructions and offers no complaints at time of d/c

## 2017-11-11 LAB
BACTERIA BLD CULT: NORMAL
BACTERIA BLD CULT: NORMAL
BACTERIA TISS AEROBE CULT: ABNORMAL
BACTERIA TISS AEROBE CULT: ABNORMAL
GRAM STN SPEC: ABNORMAL
GRAM STN SPEC: ABNORMAL

## 2017-11-13 ENCOUNTER — APPOINTMENT (OUTPATIENT)
Dept: LAB | Facility: MEDICAL CENTER | Age: 62
End: 2017-11-13
Payer: MEDICARE

## 2017-11-13 ENCOUNTER — TRANSCRIBE ORDERS (OUTPATIENT)
Dept: RADIOLOGY | Facility: MEDICAL CENTER | Age: 62
End: 2017-11-13

## 2017-11-13 DIAGNOSIS — E83.39 HYPERPHOSPHATEMIA: ICD-10-CM

## 2017-11-13 DIAGNOSIS — L03.031 CELLULITIS OF THIRD TOE, RIGHT: ICD-10-CM

## 2017-11-13 DIAGNOSIS — E83.42 HYPOMAGNESEMIA: ICD-10-CM

## 2017-11-13 DIAGNOSIS — M86.9 OSTEOMYELITIS (HCC): ICD-10-CM

## 2017-11-13 DIAGNOSIS — E55.9 VITAMIN D DEFICIENCY: ICD-10-CM

## 2017-11-13 DIAGNOSIS — E87.1 HYPONATREMIA: ICD-10-CM

## 2017-11-13 DIAGNOSIS — E83.51 HYPOCALCEMIA: ICD-10-CM

## 2017-11-13 DIAGNOSIS — R74.8 ELEVATED ALKALINE PHOSPHATASE LEVEL: ICD-10-CM

## 2017-11-13 LAB
ALBUMIN SERPL BCP-MCNC: 3.3 G/DL (ref 3.5–5)
ALP SERPL-CCNC: 94 U/L (ref 46–116)
ALT SERPL W P-5'-P-CCNC: 28 U/L (ref 12–78)
ANION GAP SERPL CALCULATED.3IONS-SCNC: 6 MMOL/L (ref 4–13)
AST SERPL W P-5'-P-CCNC: 19 U/L (ref 5–45)
BASOPHILS # BLD AUTO: 0.1 THOUSANDS/ΜL (ref 0–0.1)
BASOPHILS NFR BLD AUTO: 1 % (ref 0–1)
BILIRUB SERPL-MCNC: 0.39 MG/DL (ref 0.2–1)
BUN SERPL-MCNC: 11 MG/DL (ref 5–25)
CA-I BLD-SCNC: 1.18 MMOL/L (ref 1.12–1.32)
CALCIUM SERPL-MCNC: 9.2 MG/DL (ref 8.3–10.1)
CHLORIDE SERPL-SCNC: 101 MMOL/L (ref 100–108)
CO2 SERPL-SCNC: 29 MMOL/L (ref 21–32)
CREAT SERPL-MCNC: 0.61 MG/DL (ref 0.6–1.3)
EOSINOPHIL # BLD AUTO: 0.18 THOUSAND/ΜL (ref 0–0.61)
EOSINOPHIL NFR BLD AUTO: 2 % (ref 0–6)
ERYTHROCYTE [DISTWIDTH] IN BLOOD BY AUTOMATED COUNT: 13.7 % (ref 11.6–15.1)
GFR SERPL CREATININE-BSD FRML MDRD: 97 ML/MIN/1.73SQ M
GLUCOSE SERPL-MCNC: 149 MG/DL (ref 65–140)
HCT VFR BLD AUTO: 39.5 % (ref 34.8–46.1)
HGB BLD-MCNC: 13.1 G/DL (ref 11.5–15.4)
LYMPHOCYTES # BLD AUTO: 2.73 THOUSANDS/ΜL (ref 0.6–4.47)
LYMPHOCYTES NFR BLD AUTO: 28 % (ref 14–44)
MAGNESIUM SERPL-MCNC: 1.8 MG/DL (ref 1.6–2.6)
MCH RBC QN AUTO: 28.4 PG (ref 26.8–34.3)
MCHC RBC AUTO-ENTMCNC: 33.2 G/DL (ref 31.4–37.4)
MCV RBC AUTO: 86 FL (ref 82–98)
MONOCYTES # BLD AUTO: 0.57 THOUSAND/ΜL (ref 0.17–1.22)
MONOCYTES NFR BLD AUTO: 6 % (ref 4–12)
NEUTROPHILS # BLD AUTO: 6.04 THOUSANDS/ΜL (ref 1.85–7.62)
NEUTS SEG NFR BLD AUTO: 63 % (ref 43–75)
NRBC BLD AUTO-RTO: 0 /100 WBCS
PHOSPHATE SERPL-MCNC: 3.2 MG/DL (ref 2.3–4.1)
PLATELET # BLD AUTO: 360 THOUSANDS/UL (ref 149–390)
PMV BLD AUTO: 9.4 FL (ref 8.9–12.7)
POTASSIUM SERPL-SCNC: 3.6 MMOL/L (ref 3.5–5.3)
PROT SERPL-MCNC: 8.4 G/DL (ref 6.4–8.2)
RBC # BLD AUTO: 4.61 MILLION/UL (ref 3.81–5.12)
SODIUM SERPL-SCNC: 136 MMOL/L (ref 136–145)
WBC # BLD AUTO: 9.67 THOUSAND/UL (ref 4.31–10.16)

## 2017-11-13 PROCEDURE — 85025 COMPLETE CBC W/AUTO DIFF WBC: CPT

## 2017-11-13 PROCEDURE — 83735 ASSAY OF MAGNESIUM: CPT

## 2017-11-13 PROCEDURE — 36415 COLL VENOUS BLD VENIPUNCTURE: CPT

## 2017-11-13 PROCEDURE — 84100 ASSAY OF PHOSPHORUS: CPT

## 2017-11-13 PROCEDURE — 80053 COMPREHEN METABOLIC PANEL: CPT

## 2017-11-13 PROCEDURE — 82330 ASSAY OF CALCIUM: CPT

## 2017-11-14 NOTE — OP NOTE
OPERATIVE REPORT  PATIENT NAME: Daniel Zurita    :  1955  MRN: [id number]  Pt Location: [facility] OR ROOM 03    SURGERY DATE: 2017    Surgeon(s) and Role:     Precious Meckel, DPM - Primary    Preop Diagnosis:  1  Osteomyelitis right third toe distal phalanx    Procedure:  1  Distal phalangectomy RT 3rd toe (NOT amputation)    Specimen(s):  ID Type Source Tests Collected by Time Destination   1 : right third toe Tissue Bone TISSUE EXAM Tyler Rush DPM 2017 1426    A : bone culture right third toe Tissue Toe, Right CULTURE, TISSUE AND GRAM STAIN Tyler Rush DPM 2017 1418        Estimated Blood Loss:   Minimal    Drains:  None       Anesthesia Type: Mc with local block    Operative Indications:  Osteomyelitis RT 3rd toe      Operative Findings:  Pathologic fracture of distal phalanx RT 3rd toe    Complications:   None    Procedure and Technique:    Patient and surgical site were identified and marked pre-operatively  Consent was reviewed and all questions were answered  Discussed positive ceretec scan with the patient prior to procedure  Patient taken into operating room and placed on table in the supine position  Attention was directed to the right third toe where a digital block with 15 cc of 0 5% Marcaine was infiltrated  PAT applied to the right ankle and set at 250 mmHg  Right foot was prepped and draped in usual sterile technique  #15 scalpel was used to make a transverse incision over the distal tip of the right third toe and reflecting dorsal and plantar flaps off of underlying distal phalanx  Distal phalanx was noted to be in 3 pieces due to pathological fracture from infection  Bone sent for path and culture  Entire distal phalanx was removed and surgical site was flushed with NSS  Skin closure achieved with 4-O Nylon using simple interrupted sutures  DSD and lightly compressive ACE applied to right foot   Patient discharged back to the floor, likely discharge home tomorrow on PO antibiotics  Expect surgical cure with removal of distal phalanx       I was present for the entire procedure    Patient Disposition:  PACU     SIGNATURE: Fani Remy DPM  DATE: November 14, 2017  TIME: 8:49 AM

## 2019-02-14 ENCOUNTER — ANESTHESIA EVENT (OUTPATIENT)
Dept: PERIOP | Facility: HOSPITAL | Age: 64
End: 2019-02-14
Payer: MEDICARE

## 2019-02-15 ENCOUNTER — APPOINTMENT (OUTPATIENT)
Dept: RADIOLOGY | Facility: HOSPITAL | Age: 64
End: 2019-02-15
Payer: MEDICARE

## 2019-02-15 ENCOUNTER — ANESTHESIA (OUTPATIENT)
Dept: PERIOP | Facility: HOSPITAL | Age: 64
End: 2019-02-15
Payer: MEDICARE

## 2019-02-15 ENCOUNTER — HOSPITAL ENCOUNTER (OUTPATIENT)
Facility: HOSPITAL | Age: 64
Setting detail: OUTPATIENT SURGERY
Discharge: HOME | End: 2019-02-15
Attending: PODIATRIST | Admitting: PODIATRIST
Payer: MEDICARE

## 2019-02-15 VITALS
OXYGEN SATURATION: 94 % | BODY MASS INDEX: 39.4 KG/M2 | RESPIRATION RATE: 18 BRPM | WEIGHT: 260 LBS | TEMPERATURE: 98.1 F | DIASTOLIC BLOOD PRESSURE: 74 MMHG | SYSTOLIC BLOOD PRESSURE: 134 MMHG | HEART RATE: 84 BPM | HEIGHT: 68 IN

## 2019-02-15 DIAGNOSIS — M86.271 SUBACUTE OSTEOMYELITIS OF RIGHT FOOT (HCC): Primary | ICD-10-CM

## 2019-02-15 DIAGNOSIS — M86.171 OTHER ACUTE OSTEOMYELITIS, RIGHT ANKLE AND FOOT (HCC): ICD-10-CM

## 2019-02-15 LAB — GLUCOSE SERPL-MCNC: 147 MG/DL (ref 65–140)

## 2019-02-15 PROCEDURE — 73630 X-RAY EXAM OF FOOT: CPT

## 2019-02-15 PROCEDURE — 82948 REAGENT STRIP/BLOOD GLUCOSE: CPT

## 2019-02-15 PROCEDURE — 88300 SURGICAL PATH GROSS: CPT | Performed by: PATHOLOGY

## 2019-02-15 RX ORDER — SODIUM CHLORIDE, SODIUM LACTATE, POTASSIUM CHLORIDE, CALCIUM CHLORIDE 600; 310; 30; 20 MG/100ML; MG/100ML; MG/100ML; MG/100ML
125 INJECTION, SOLUTION INTRAVENOUS CONTINUOUS
Status: DISCONTINUED | OUTPATIENT
Start: 2019-02-15 | End: 2019-02-15 | Stop reason: HOSPADM

## 2019-02-15 RX ORDER — FENTANYL CITRATE/PF 50 MCG/ML
25 SYRINGE (ML) INJECTION
Status: DISCONTINUED | OUTPATIENT
Start: 2019-02-15 | End: 2019-02-15 | Stop reason: HOSPADM

## 2019-02-15 RX ORDER — FENTANYL CITRATE 50 UG/ML
INJECTION, SOLUTION INTRAMUSCULAR; INTRAVENOUS AS NEEDED
Status: DISCONTINUED | OUTPATIENT
Start: 2019-02-15 | End: 2019-02-15 | Stop reason: SURG

## 2019-02-15 RX ORDER — CEPHALEXIN 500 MG/1
500 CAPSULE ORAL EVERY 8 HOURS SCHEDULED
Qty: 30 CAPSULE | Refills: 0 | Status: SHIPPED | OUTPATIENT
Start: 2019-02-15 | End: 2019-02-25

## 2019-02-15 RX ORDER — PROPOFOL 10 MG/ML
INJECTION, EMULSION INTRAVENOUS CONTINUOUS PRN
Status: DISCONTINUED | OUTPATIENT
Start: 2019-02-15 | End: 2019-02-15 | Stop reason: SURG

## 2019-02-15 RX ORDER — ONDANSETRON 2 MG/ML
4 INJECTION INTRAMUSCULAR; INTRAVENOUS ONCE AS NEEDED
Status: DISCONTINUED | OUTPATIENT
Start: 2019-02-15 | End: 2019-02-15 | Stop reason: HOSPADM

## 2019-02-15 RX ORDER — HYDROCODONE BITARTRATE AND ACETAMINOPHEN 5; 325 MG/1; MG/1
1 TABLET ORAL EVERY 6 HOURS PRN
Qty: 20 TABLET | Refills: 0 | Status: SHIPPED | OUTPATIENT
Start: 2019-02-15 | End: 2019-02-20

## 2019-02-15 RX ORDER — MIDAZOLAM HYDROCHLORIDE 1 MG/ML
INJECTION INTRAMUSCULAR; INTRAVENOUS AS NEEDED
Status: DISCONTINUED | OUTPATIENT
Start: 2019-02-15 | End: 2019-02-15 | Stop reason: SURG

## 2019-02-15 RX ORDER — BUPIVACAINE HYDROCHLORIDE 5 MG/ML
INJECTION, SOLUTION PERINEURAL AS NEEDED
Status: DISCONTINUED | OUTPATIENT
Start: 2019-02-15 | End: 2019-02-15 | Stop reason: HOSPADM

## 2019-02-15 RX ADMIN — FENTANYL CITRATE 25 MCG: 50 INJECTION, SOLUTION INTRAMUSCULAR; INTRAVENOUS at 10:25

## 2019-02-15 RX ADMIN — SODIUM CHLORIDE, SODIUM LACTATE, POTASSIUM CHLORIDE, AND CALCIUM CHLORIDE 125 ML/HR: .6; .31; .03; .02 INJECTION, SOLUTION INTRAVENOUS at 08:36

## 2019-02-15 RX ADMIN — PROPOFOL 100 MCG/KG/MIN: 10 INJECTION, EMULSION INTRAVENOUS at 09:41

## 2019-02-15 RX ADMIN — FENTANYL CITRATE 25 MCG: 50 INJECTION, SOLUTION INTRAMUSCULAR; INTRAVENOUS at 10:34

## 2019-02-15 RX ADMIN — MIDAZOLAM HYDROCHLORIDE 2 MG: 1 INJECTION, SOLUTION INTRAMUSCULAR; INTRAVENOUS at 09:41

## 2019-02-15 RX ADMIN — FENTANYL CITRATE 25 MCG: 50 INJECTION, SOLUTION INTRAMUSCULAR; INTRAVENOUS at 10:30

## 2019-02-15 RX ADMIN — FENTANYL CITRATE 25 MCG: 50 INJECTION, SOLUTION INTRAMUSCULAR; INTRAVENOUS at 09:44

## 2019-02-15 NOTE — OP NOTE
OPERATIVE REPORT  PATIENT NAME: Juan Francisco Sullivan    :  1955  MRN: [id number]  Pt Location: [facility] OR ROOM 02    SURGERY DATE: 2/15/2019    Surgeon(s) and Role:     * Shameka Gaines DPM - Primary    Preop Diagnosis:  Other acute osteomyelitis, right 5th metatarsal ([address] ) [M86 171]  Diabetic ulcer RT lateral forefoot through subQ E11 621/L97 512    Post-Op Diagnosis Codes:     * Other acute osteomyelitis, right 5th metatarsal ([address] ) [M86 171]     Diabetic ulcer RT lateral forefoot through subQ E11 621/L97 512    Procedure(s) (LRB):  5th METATARSAL HEAD RESECTION (Right)    Specimen(s):  ID Type Source Tests Collected by Time Destination   1 : 5th metatarsil head Right Foot Tissue Foot, Right TISSUE EXAM Shameka Gaines DPM 2/15/2019 1002        Estimated Blood Loss:   5 mL    Drains:  * No LDAs found *    Anesthesia Type:   IV Sedation with Anesthesia    Operative Indications: Other acute osteomyelitis, right 5th metatarsal ([address] ) [M86 171]  Diabetic ulcer right lateral foot    Operative Findings:  Infected lateral 5th metatarsal head    Complications:   None    Procedure and Technique:    The patient was identified and the surgical site to the right foot was identified and marked pre-op  Patient taken to OR and placed on table in the supine position  Anesthesia administered as per the anesthesia department  Following sedation, a local block with 20 cc's of 0 5% Marcaine plain was infiltrated into the right forefoot proximal to the 5th metatarsal head in a reverse Pelaez block fashion      A 4 cm linear skin incision was made to the dorsal lateral aspect of the right 5th MPJ with a #15 scalpel  Dissection continued through the subcutaneous tissues with a #15 scalpel and the tissues overlying the 5th metatarsal head and shaft were reflected  A sagittal saw was used to resect the 5th metatarsal head which was removed and sent to pathology    Approximately 5mm of the 5th metatarsal neck was resected at this time and the remaining bone bled briskly on dropping the tourniquet  The incision was flushed with NSS and the incisions was re-approximated with 4-O Monocryl for subcutaneous closure and 4-O Nylon for skin closure  The plantar diabetic wound was then surgically debrided with #15 scalpel over a 1 5 cm x 1 0 cm x 0 5 cm area, 1 5 square cm debrided of non-viable and chronicly infected soft tissues  The wound was then partially re-approximated with 3-O Nylon  DSD with lightly compressive ACE wrap applied to the right foot  Discharged from [address] [place] [address] medically stable and with her pain well controlled          I was present for the entire procedure    Patient Disposition:  PACU     SIGNATURE: Fantasma Macdonald DPM  DATE: February 15, 2019  TIME: 10:46 AM

## 2019-02-15 NOTE — ANESTHESIA POSTPROCEDURE EVALUATION
Post-Op Assessment Note    CV Status:  Stable  Pain Score: 0    Pain management: adequate     Mental Status:  Alert and awake   Hydration Status:  Euvolemic   PONV Controlled:  Controlled   Airway Patency:  Patent   Post Op Vitals Reviewed: Yes      Staff: CRNA, Anesthesiologist   Comments: no surgical pain; c/o 7/10 pain in back (baseline); also c/o  posterior ankle pain (baseline)          /68 (02/15/19 1034)    Temp (!) 97 4 °F (36 3 °C) (02/15/19 1034)    Pulse 80 (02/15/19 1034)   Resp 20 (02/15/19 1034)    SpO2 99 % (02/15/19 1034)

## 2019-02-15 NOTE — ANESTHESIA PREPROCEDURE EVALUATION
Review of Systems/Medical History          Cardiovascular  EKG reviewed, Hypertension ,    Pulmonary  Not a smoker ,        GI/Hepatic            Endo/Other  Diabetes ,      GYN       Hematology   Musculoskeletal       Neurology   Psychology           Physical Exam    Airway    Mallampati score: III  TM Distance: >3 FB  Neck ROM: full     Dental       Cardiovascular  Rhythm: irregular, Rate: normal,     Pulmonary  Breath sounds clear to auscultation,     Other Findings        Anesthesia Plan  ASA Score- 3     Anesthesia Type- IV sedation with anesthesia with ASA Monitors  Additional Monitors:   Airway Plan:         Plan Factors-Patient not instructed to abstain from smoking on day of procedure  Patient did not smoke on day of surgery  Induction- intravenous  Postoperative Plan-     Informed Consent- Anesthetic plan and risks discussed with patient  I personally reviewed this patient with the CRNA  Discussed and agreed on the Anesthesia Plan with the CRNA  Gerhardt Sep

## 2019-02-15 NOTE — PROGRESS NOTES
History and Physical Reviewed  No New changes  Heart and Lungs Examination performed   Ifeoma Randhawa MD

## 2021-01-24 ENCOUNTER — APPOINTMENT (EMERGENCY)
Dept: RADIOLOGY | Facility: HOSPITAL | Age: 66
DRG: 177 | End: 2021-01-24
Payer: MEDICARE

## 2021-01-24 ENCOUNTER — APPOINTMENT (EMERGENCY)
Dept: CT IMAGING | Facility: HOSPITAL | Age: 66
DRG: 177 | End: 2021-01-24
Payer: MEDICARE

## 2021-01-24 ENCOUNTER — HOSPITAL ENCOUNTER (INPATIENT)
Facility: HOSPITAL | Age: 66
LOS: 2 days | Discharge: HOME | DRG: 177 | End: 2021-01-26
Attending: EMERGENCY MEDICINE | Admitting: INTERNAL MEDICINE
Payer: MEDICARE

## 2021-01-24 DIAGNOSIS — I48.91 ATRIAL FIBRILLATION WITH RAPID VENTRICULAR RESPONSE (HCC): Primary | ICD-10-CM

## 2021-01-24 DIAGNOSIS — R91.1 LUNG NODULE: ICD-10-CM

## 2021-01-24 DIAGNOSIS — I50.9 CHF (CONGESTIVE HEART FAILURE) (HCC): ICD-10-CM

## 2021-01-24 DIAGNOSIS — U07.1 COVID-19 VIRUS INFECTION: ICD-10-CM

## 2021-01-24 DIAGNOSIS — R77.8 ELEVATED TROPONIN: ICD-10-CM

## 2021-01-24 DIAGNOSIS — R59.0 MEDIASTINAL LYMPHADENOPATHY: ICD-10-CM

## 2021-01-24 PROBLEM — E83.51 HYPOCALCEMIA: Status: RESOLVED | Noted: 2017-11-09 | Resolved: 2021-01-24

## 2021-01-24 PROBLEM — E83.39 HYPERPHOSPHATEMIA: Status: RESOLVED | Noted: 2017-11-09 | Resolved: 2021-01-24

## 2021-01-24 PROBLEM — J12.82 PNEUMONIA DUE TO COVID-19 VIRUS: Status: ACTIVE | Noted: 2021-01-24

## 2021-01-24 PROBLEM — L97.519: Status: RESOLVED | Noted: 2017-11-06 | Resolved: 2021-01-24

## 2021-01-24 PROBLEM — E83.42 HYPOMAGNESEMIA: Status: RESOLVED | Noted: 2017-11-09 | Resolved: 2021-01-24

## 2021-01-24 PROBLEM — I50.32 CHRONIC DIASTOLIC CONGESTIVE HEART FAILURE (HCC): Status: ACTIVE | Noted: 2021-01-24

## 2021-01-24 PROBLEM — R70.0 ELEVATED SED RATE: Status: RESOLVED | Noted: 2017-11-06 | Resolved: 2021-01-24

## 2021-01-24 PROBLEM — L03.031 CELLULITIS OF THIRD TOE, RIGHT: Status: RESOLVED | Noted: 2017-11-06 | Resolved: 2021-01-24

## 2021-01-24 LAB
ALBUMIN SERPL BCP-MCNC: 3.1 G/DL (ref 3.5–5)
ALP SERPL-CCNC: 107 U/L (ref 46–116)
ALT SERPL W P-5'-P-CCNC: 32 U/L (ref 12–78)
ANION GAP SERPL CALCULATED.3IONS-SCNC: 8 MMOL/L (ref 4–13)
APTT PPP: 38 SECONDS (ref 23–37)
AST SERPL W P-5'-P-CCNC: 31 U/L (ref 5–45)
BASOPHILS # BLD AUTO: 0.02 THOUSANDS/ΜL (ref 0–0.1)
BASOPHILS NFR BLD AUTO: 0 % (ref 0–1)
BILIRUB SERPL-MCNC: 0.5 MG/DL (ref 0.2–1)
BILIRUB UR QL STRIP: NEGATIVE
BUN SERPL-MCNC: 21 MG/DL (ref 5–25)
CALCIUM ALBUM COR SERPL-MCNC: 9.4 MG/DL (ref 8.3–10.1)
CALCIUM SERPL-MCNC: 8.7 MG/DL (ref 8.3–10.1)
CHLORIDE SERPL-SCNC: 100 MMOL/L (ref 100–108)
CK MB SERPL-MCNC: 0.5 NG/ML (ref 0–5)
CK MB SERPL-MCNC: <1 % (ref 0–2.5)
CK SERPL-CCNC: 222 U/L (ref 26–192)
CLARITY UR: CLEAR
CO2 SERPL-SCNC: 26 MMOL/L (ref 21–32)
COLOR UR: NORMAL
CREAT SERPL-MCNC: 1.07 MG/DL (ref 0.6–1.3)
CRP SERPL QL: 91.6 MG/L
D DIMER PPP FEU-MCNC: 1.88 UG/ML FEU
EOSINOPHIL # BLD AUTO: 0 THOUSAND/ΜL (ref 0–0.61)
EOSINOPHIL NFR BLD AUTO: 0 % (ref 0–6)
ERYTHROCYTE [DISTWIDTH] IN BLOOD BY AUTOMATED COUNT: 14.6 % (ref 11.6–15.1)
FLUAV RNA RESP QL NAA+PROBE: NEGATIVE
FLUBV RNA RESP QL NAA+PROBE: NEGATIVE
GFR SERPL CREATININE-BSD FRML MDRD: 55 ML/MIN/1.73SQ M
GLUCOSE SERPL-MCNC: 120 MG/DL (ref 65–140)
GLUCOSE SERPL-MCNC: 87 MG/DL (ref 65–140)
GLUCOSE UR STRIP-MCNC: NEGATIVE MG/DL
HCT VFR BLD AUTO: 33.4 % (ref 34.8–46.1)
HGB BLD-MCNC: 10.8 G/DL (ref 11.5–15.4)
HGB UR QL STRIP.AUTO: NEGATIVE
IMM GRANULOCYTES # BLD AUTO: 0.02 THOUSAND/UL (ref 0–0.2)
IMM GRANULOCYTES NFR BLD AUTO: 0 % (ref 0–2)
INR PPP: 1.1 (ref 0.84–1.19)
KETONES UR STRIP-MCNC: NEGATIVE MG/DL
LACTATE SERPL-SCNC: 1.2 MMOL/L (ref 0.5–2)
LEUKOCYTE ESTERASE UR QL STRIP: NEGATIVE
LYMPHOCYTES # BLD AUTO: 1.23 THOUSANDS/ΜL (ref 0.6–4.47)
LYMPHOCYTES NFR BLD AUTO: 21 % (ref 14–44)
MAGNESIUM SERPL-MCNC: 2 MG/DL (ref 1.6–2.6)
MCH RBC QN AUTO: 28.1 PG (ref 26.8–34.3)
MCHC RBC AUTO-ENTMCNC: 32.3 G/DL (ref 31.4–37.4)
MCV RBC AUTO: 87 FL (ref 82–98)
MONOCYTES # BLD AUTO: 0.34 THOUSAND/ΜL (ref 0.17–1.22)
MONOCYTES NFR BLD AUTO: 6 % (ref 4–12)
NEUTROPHILS # BLD AUTO: 4.29 THOUSANDS/ΜL (ref 1.85–7.62)
NEUTS SEG NFR BLD AUTO: 73 % (ref 43–75)
NITRITE UR QL STRIP: NEGATIVE
NRBC BLD AUTO-RTO: 0 /100 WBCS
NT-PROBNP SERPL-MCNC: 4249 PG/ML
PH UR STRIP.AUTO: 6 [PH]
PLATELET # BLD AUTO: 244 THOUSANDS/UL (ref 149–390)
PMV BLD AUTO: 9.6 FL (ref 8.9–12.7)
POTASSIUM SERPL-SCNC: 3.8 MMOL/L (ref 3.5–5.3)
PROT SERPL-MCNC: 7.5 G/DL (ref 6.4–8.2)
PROT UR STRIP-MCNC: NEGATIVE MG/DL
PROTHROMBIN TIME: 14 SECONDS (ref 11.6–14.5)
RBC # BLD AUTO: 3.84 MILLION/UL (ref 3.81–5.12)
RSV RNA RESP QL NAA+PROBE: NEGATIVE
SARS-COV-2 RNA RESP QL NAA+PROBE: POSITIVE
SODIUM SERPL-SCNC: 134 MMOL/L (ref 136–145)
SP GR UR STRIP.AUTO: <=1.005 (ref 1–1.03)
TROPONIN I SERPL-MCNC: 0.28 NG/ML
TROPONIN I SERPL-MCNC: 0.29 NG/ML
TROPONIN I SERPL-MCNC: 0.3 NG/ML
TSH SERPL DL<=0.05 MIU/L-ACNC: 0.89 UIU/ML (ref 0.36–3.74)
UROBILINOGEN UR QL STRIP.AUTO: 0.2 E.U./DL
WBC # BLD AUTO: 5.9 THOUSAND/UL (ref 4.31–10.16)

## 2021-01-24 PROCEDURE — 80053 COMPREHEN METABOLIC PANEL: CPT | Performed by: PHYSICIAN ASSISTANT

## 2021-01-24 PROCEDURE — 1124F ACP DISCUSS-NO DSCNMKR DOCD: CPT | Performed by: PHYSICIAN ASSISTANT

## 2021-01-24 PROCEDURE — 85610 PROTHROMBIN TIME: CPT | Performed by: PHYSICIAN ASSISTANT

## 2021-01-24 PROCEDURE — 87040 BLOOD CULTURE FOR BACTERIA: CPT | Performed by: PHYSICIAN ASSISTANT

## 2021-01-24 PROCEDURE — 85025 COMPLETE CBC W/AUTO DIFF WBC: CPT | Performed by: PHYSICIAN ASSISTANT

## 2021-01-24 PROCEDURE — 96374 THER/PROPH/DIAG INJ IV PUSH: CPT

## 2021-01-24 PROCEDURE — 83605 ASSAY OF LACTIC ACID: CPT | Performed by: PHYSICIAN ASSISTANT

## 2021-01-24 PROCEDURE — 99223 1ST HOSP IP/OBS HIGH 75: CPT | Performed by: INTERNAL MEDICINE

## 2021-01-24 PROCEDURE — 99291 CRITICAL CARE FIRST HOUR: CPT

## 2021-01-24 PROCEDURE — 36415 COLL VENOUS BLD VENIPUNCTURE: CPT | Performed by: PHYSICIAN ASSISTANT

## 2021-01-24 PROCEDURE — 85379 FIBRIN DEGRADATION QUANT: CPT | Performed by: PHYSICIAN ASSISTANT

## 2021-01-24 PROCEDURE — 0241U HB NFCT DS VIR RESP RNA 4 TRGT: CPT | Performed by: PHYSICIAN ASSISTANT

## 2021-01-24 PROCEDURE — 71275 CT ANGIOGRAPHY CHEST: CPT

## 2021-01-24 PROCEDURE — 85730 THROMBOPLASTIN TIME PARTIAL: CPT | Performed by: PHYSICIAN ASSISTANT

## 2021-01-24 PROCEDURE — 83036 HEMOGLOBIN GLYCOSYLATED A1C: CPT | Performed by: INTERNAL MEDICINE

## 2021-01-24 PROCEDURE — 84443 ASSAY THYROID STIM HORMONE: CPT | Performed by: PHYSICIAN ASSISTANT

## 2021-01-24 PROCEDURE — 81003 URINALYSIS AUTO W/O SCOPE: CPT | Performed by: INTERNAL MEDICINE

## 2021-01-24 PROCEDURE — 86140 C-REACTIVE PROTEIN: CPT | Performed by: PHYSICIAN ASSISTANT

## 2021-01-24 PROCEDURE — 99291 CRITICAL CARE FIRST HOUR: CPT | Performed by: PHYSICIAN ASSISTANT

## 2021-01-24 PROCEDURE — 84484 ASSAY OF TROPONIN QUANT: CPT | Performed by: INTERNAL MEDICINE

## 2021-01-24 PROCEDURE — 82550 ASSAY OF CK (CPK): CPT | Performed by: PHYSICIAN ASSISTANT

## 2021-01-24 PROCEDURE — 82553 CREATINE MB FRACTION: CPT | Performed by: PHYSICIAN ASSISTANT

## 2021-01-24 PROCEDURE — 83880 ASSAY OF NATRIURETIC PEPTIDE: CPT | Performed by: PHYSICIAN ASSISTANT

## 2021-01-24 PROCEDURE — 83735 ASSAY OF MAGNESIUM: CPT | Performed by: PHYSICIAN ASSISTANT

## 2021-01-24 PROCEDURE — 93005 ELECTROCARDIOGRAM TRACING: CPT

## 2021-01-24 PROCEDURE — 84145 PROCALCITONIN (PCT): CPT | Performed by: PHYSICIAN ASSISTANT

## 2021-01-24 PROCEDURE — 96361 HYDRATE IV INFUSION ADD-ON: CPT

## 2021-01-24 PROCEDURE — G1004 CDSM NDSC: HCPCS

## 2021-01-24 PROCEDURE — 82948 REAGENT STRIP/BLOOD GLUCOSE: CPT

## 2021-01-24 PROCEDURE — 84484 ASSAY OF TROPONIN QUANT: CPT | Performed by: PHYSICIAN ASSISTANT

## 2021-01-24 PROCEDURE — 82728 ASSAY OF FERRITIN: CPT | Performed by: PHYSICIAN ASSISTANT

## 2021-01-24 PROCEDURE — 71045 X-RAY EXAM CHEST 1 VIEW: CPT

## 2021-01-24 RX ORDER — MELATONIN
2000 DAILY
Status: DISCONTINUED | OUTPATIENT
Start: 2021-01-25 | End: 2021-01-26 | Stop reason: HOSPADM

## 2021-01-24 RX ORDER — ASCORBIC ACID 500 MG
1000 TABLET ORAL EVERY 12 HOURS SCHEDULED
Status: DISCONTINUED | OUTPATIENT
Start: 2021-01-24 | End: 2021-01-26 | Stop reason: HOSPADM

## 2021-01-24 RX ORDER — MAGNESIUM HYDROXIDE/ALUMINUM HYDROXICE/SIMETHICONE 120; 1200; 1200 MG/30ML; MG/30ML; MG/30ML
30 SUSPENSION ORAL EVERY 6 HOURS PRN
Status: DISCONTINUED | OUTPATIENT
Start: 2021-01-24 | End: 2021-01-26 | Stop reason: HOSPADM

## 2021-01-24 RX ORDER — MULTIVITAMIN/IRON/FOLIC ACID 18MG-0.4MG
1 TABLET ORAL DAILY
Status: DISCONTINUED | OUTPATIENT
Start: 2021-02-01 | End: 2021-01-26 | Stop reason: HOSPADM

## 2021-01-24 RX ORDER — ZINC SULFATE 50(220)MG
220 CAPSULE ORAL DAILY
Status: DISCONTINUED | OUTPATIENT
Start: 2021-01-25 | End: 2021-01-26 | Stop reason: HOSPADM

## 2021-01-24 RX ORDER — ONDANSETRON 2 MG/ML
4 INJECTION INTRAMUSCULAR; INTRAVENOUS EVERY 6 HOURS PRN
Status: DISCONTINUED | OUTPATIENT
Start: 2021-01-24 | End: 2021-01-26 | Stop reason: HOSPADM

## 2021-01-24 RX ORDER — MELATONIN
2000 DAILY
COMMUNITY

## 2021-01-24 RX ORDER — ACETAMINOPHEN 325 MG/1
650 TABLET ORAL EVERY 6 HOURS PRN
Status: DISCONTINUED | OUTPATIENT
Start: 2021-01-24 | End: 2021-01-26 | Stop reason: HOSPADM

## 2021-01-24 RX ORDER — SODIUM CHLORIDE 9 MG/ML
75 INJECTION, SOLUTION INTRAVENOUS ONCE
Status: COMPLETED | OUTPATIENT
Start: 2021-01-24 | End: 2021-01-25

## 2021-01-24 RX ORDER — DILTIAZEM HYDROCHLORIDE 5 MG/ML
20 INJECTION INTRAVENOUS ONCE
Status: COMPLETED | OUTPATIENT
Start: 2021-01-24 | End: 2021-01-24

## 2021-01-24 RX ORDER — INSULIN ASPART 100 [IU]/ML
15 INJECTION, SUSPENSION SUBCUTANEOUS
Status: DISCONTINUED | OUTPATIENT
Start: 2021-01-25 | End: 2021-01-26 | Stop reason: HOSPADM

## 2021-01-24 RX ORDER — FUROSEMIDE 40 MG/1
40 TABLET ORAL
COMMUNITY
Start: 2020-12-01

## 2021-01-24 RX ORDER — ASPIRIN 325 MG
325 TABLET ORAL ONCE
Status: COMPLETED | OUTPATIENT
Start: 2021-01-24 | End: 2021-01-24

## 2021-01-24 RX ORDER — BENZONATATE 100 MG/1
200 CAPSULE ORAL ONCE
Status: COMPLETED | OUTPATIENT
Start: 2021-01-24 | End: 2021-01-24

## 2021-01-24 RX ORDER — HYDROCODONE BITARTRATE AND ACETAMINOPHEN 5; 325 MG/1; MG/1
1 TABLET ORAL ONCE
Status: COMPLETED | OUTPATIENT
Start: 2021-01-24 | End: 2021-01-24

## 2021-01-24 RX ADMIN — APIXABAN 5 MG: 5 TABLET, FILM COATED ORAL at 21:31

## 2021-01-24 RX ADMIN — ASPIRIN 325 MG ORAL TABLET 325 MG: 325 PILL ORAL at 15:56

## 2021-01-24 RX ADMIN — SODIUM CHLORIDE 75 ML/HR: 0.9 INJECTION, SOLUTION INTRAVENOUS at 21:30

## 2021-01-24 RX ADMIN — BENZONATATE 200 MG: 100 CAPSULE ORAL at 17:38

## 2021-01-24 RX ADMIN — HYDROCODONE BITARTRATE AND ACETAMINOPHEN 1 TABLET: 5; 325 TABLET ORAL at 17:38

## 2021-01-24 RX ADMIN — OXYCODONE HYDROCHLORIDE AND ACETAMINOPHEN 1000 MG: 500 TABLET ORAL at 21:31

## 2021-01-24 RX ADMIN — IOHEXOL 85 ML: 350 INJECTION, SOLUTION INTRAVENOUS at 15:51

## 2021-01-24 RX ADMIN — DILTIAZEM HYDROCHLORIDE 5 MG/HR: 5 INJECTION INTRAVENOUS at 17:43

## 2021-01-24 RX ADMIN — DILTIAZEM HYDROCHLORIDE 20 MG: 5 INJECTION INTRAVENOUS at 14:32

## 2021-01-24 RX ADMIN — SODIUM CHLORIDE 250 ML: 0.9 INJECTION, SOLUTION INTRAVENOUS at 14:29

## 2021-01-24 RX ADMIN — DILTIAZEM HYDROCHLORIDE 7.5 MG/HR: 5 INJECTION INTRAVENOUS at 20:24

## 2021-01-24 NOTE — PLAN OF CARE
Problem: Potential for Falls  Goal: Patient will remain free of falls  Description: INTERVENTIONS:  - Assess patient frequently for physical needs  -  Identify cognitive and physical deficits and behaviors that affect risk of falls    -  [place] fall precautions as indicated by assessment   - Educate patient/family on patient safety including physical limitations  - Instruct patient to call for assistance with activity based on assessment  - Modify environment to reduce risk of injury  - Consider OT/PT consult to assist with strengthening/mobility  Outcome: Progressing     Problem: CARDIOVASCULAR - ADULT  Goal: Maintains optimal cardiac output and hemodynamic stability  Description: INTERVENTIONS:  - Monitor I/O, vital signs and rhythm  - Monitor for S/S and trends of decreased cardiac output  - Administer and titrate ordered vasoactive medications to optimize hemodynamic stability  - Assess quality of pulses, skin color and temperature  - Assess for signs of decreased coronary artery perfusion  - Instruct patient to report change in severity of symptoms  Outcome: Progressing  Goal: Absence of cardiac dysrhythmias or at baseline rhythm  Description: INTERVENTIONS:  - Continuous cardiac monitoring, vital signs, obtain 12 lead EKG if ordered  - Administer antiarrhythmic and heart rate control medications as ordered  - Monitor electrolytes and administer replacement therapy as ordered  Outcome: Progressing     Problem: RESPIRATORY - ADULT  Goal: Achieves optimal ventilation and oxygenation  Description: INTERVENTIONS:  - Assess for changes in respiratory status  - Assess for changes in mentation and behavior  - Position to facilitate oxygenation and minimize respiratory effort  - Oxygen administered by appropriate delivery if ordered  - Initiate smoking cessation education as indicated  - Encourage broncho-pulmonary hygiene including cough, deep breathe, Incentive Spirometry  - Assess the need for suctioning and aspirate as needed  - Assess and instruct to report SOB or any respiratory difficulty  - Respiratory Therapy support as indicated  Outcome: Progressing     Problem: GASTROINTESTINAL - ADULT  Goal: Maintains or returns to baseline bowel function  Description: INTERVENTIONS:  - Assess bowel function  - Encourage oral fluids to ensure adequate hydration  - Administer IV fluids if ordered to ensure adequate hydration  - Administer ordered medications as needed  - Encourage mobilization and activity  - Consider nutritional services referral to assist patient with adequate nutrition and appropriate food choices  Outcome: Progressing  Goal: Maintains adequate nutritional intake  Description: INTERVENTIONS:  - Monitor percentage of each meal consumed  - Identify factors contributing to decreased intake, treat as appropriate  - Assist with meals as needed  - Monitor I&O, weight, and lab values if indicated  - Obtain nutrition services referral as needed  Outcome: Progressing     Problem: GENITOURINARY - ADULT  Goal: Maintains or returns to baseline urinary function  Description: INTERVENTIONS:  - Assess urinary function  - Encourage oral fluids to ensure adequate hydration if ordered  - Administer IV fluids as ordered to ensure adequate hydration  - Administer ordered medications as needed  - Offer frequent toileting  - Follow urinary retention protocol if ordered  Outcome: Progressing     Problem: METABOLIC, FLUID AND ELECTROLYTES - ADULT  Goal: Electrolytes maintained within normal limits  Description: INTERVENTIONS:  - Monitor labs and assess patient for signs and symptoms of electrolyte imbalances  - Administer electrolyte replacement as ordered  - Monitor response to electrolyte replacements, including repeat lab results as appropriate  - Instruct patient on fluid and nutrition as appropriate  Outcome: Progressing  Goal: Fluid balance maintained  Description: INTERVENTIONS:  - Monitor labs   - Monitor I/O and WT  - Instruct patient on fluid and nutrition as appropriate  - Assess for signs & symptoms of volume excess or deficit  Outcome: Progressing  Goal: Glucose maintained within target range  Description: INTERVENTIONS:  - Monitor Blood Glucose as ordered  - Assess for signs and symptoms of hyperglycemia and hypoglycemia  - Administer ordered medications to maintain glucose within target range  - Assess nutritional intake and initiate nutrition service referral as needed  Outcome: Progressing     Problem: SKIN/TISSUE INTEGRITY - ADULT  Goal: Skin integrity remains intact  Description: INTERVENTIONS  - Identify patients at risk for skin breakdown  - Assess and monitor skin integrity  - Assess and monitor nutrition and hydration status  - Monitor labs (i e  albumin)  - Assess for incontinence   - Turn and reposition patient  - Assist with mobility/ambulation  - Relieve pressure over bony prominences  - Avoid friction and shearing  - Provide appropriate hygiene as needed including keeping skin clean and dry  - Evaluate need for skin moisturizer/barrier cream  - Collaborate with interdisciplinary team (i e  Nutrition, Rehabilitation, etc )   - Patient/family teaching  Outcome: Progressing  Goal: Incision(s), wounds(s) or drain site(s) healing without S/S of infection  Description: INTERVENTIONS  - Assess and document risk factors for skin impairment   - Assess and document dressing, incision, wound bed, drain sites and surrounding tissue  - Consider nutrition services referral as needed  - Oral mucous membranes remain intact  - Provide patient/ family education  Outcome: Progressing  Goal: Oral mucous membranes remain intact  Description: INTERVENTIONS  - Assess oral mucosa and hygiene practices  - Implement preventative oral hygiene regimen  - Implement oral medicated treatments as ordered  - Initiate Nutrition services referral as needed  Outcome: Progressing     Problem: HEMATOLOGIC - ADULT  Goal: Maintains hematologic stability  Description: INTERVENTIONS  - Assess for signs and symptoms of bleeding or hemorrhage  - Monitor labs  - Administer supportive blood products/factors as ordered and appropriate  Outcome: Progressing     Problem: MUSCULOSKELETAL - ADULT  Goal: Maintain or return mobility to safest level of function  Description: INTERVENTIONS:  - Assess patient's ability to carry out ADLs; assess patient's baseline for ADL function and identify physical deficits which impact ability to perform ADLs (bathing, care of mouth/teeth, toileting, grooming, dressing, etc )  - Assess/evaluate cause of self-care deficits   - Assess range of motion  - Assess patient's mobility  - Assess patient's need for assistive devices and provide as appropriate  - Encourage maximum independence but intervene and supervise when necessary  - Involve family in performance of ADLs  - Assess for home care needs following discharge   - Consider OT consult to assist with ADL evaluation and planning for discharge  - Provide patient education as appropriate  Outcome: Progressing     Problem: PAIN - ADULT  Goal: Verbalizes/displays adequate comfort level or baseline comfort level  Description: Interventions:  - Encourage patient to monitor pain and request assistance  - Assess pain using appropriate pain scale  - Administer analgesics based on type and severity of pain and evaluate response  - Implement non-pharmacological measures as appropriate and evaluate response  - Consider cultural and social influences on pain and pain management  - Notify physician/advanced practitioner if interventions unsuccessful or patient reports new pain  Outcome: Progressing     Problem: INFECTION - ADULT  Goal: Absence or prevention of progression during hospitalization  Description: INTERVENTIONS:  - Assess and monitor for signs and symptoms of infection  - Monitor lab/diagnostic results  - Monitor all insertion sites, i e  indwelling lines, tubes, and drains  - Monitor endotracheal if appropriate and nasal secretions for changes in amount and color  - [place] appropriate cooling/warming therapies per order  - Administer medications as ordered  - Instruct and encourage patient and family to use good hand hygiene technique  - Identify and instruct in appropriate isolation precautions for identified infection/condition  Outcome: Progressing     Problem: SAFETY ADULT  Goal: Patient will remain free of falls  Description: INTERVENTIONS:  - Assess patient frequently for physical needs  -  Identify cognitive and physical deficits and behaviors that affect risk of falls    -  [place] fall precautions as indicated by assessment   - Educate patient/family on patient safety including physical limitations  - Instruct patient to call for assistance with activity based on assessment  - Modify environment to reduce risk of injury  - Consider OT/PT consult to assist with strengthening/mobility  Outcome: Progressing  Goal: Maintain or return to baseline ADL function  Description: INTERVENTIONS:  -  Assess patient's ability to carry out ADLs; assess patient's baseline for ADL function and identify physical deficits which impact ability to perform ADLs (bathing, care of mouth/teeth, toileting, grooming, dressing, etc )  - Assess/evaluate cause of self-care deficits   - Assess range of motion  - Assess patient's mobility; develop plan if impaired  - Assess patient's need for assistive devices and provide as appropriate  - Encourage maximum independence but intervene and supervise when necessary  - Involve family in performance of ADLs  - Assess for home care needs following discharge   - Consider OT consult to assist with ADL evaluation and planning for discharge  - Provide patient education as appropriate  Outcome: Progressing  Goal: Maintain or return mobility status to optimal level  Description: INTERVENTIONS:  - Assess patient's baseline mobility status (ambulation, transfers, stairs, etc ) - Identify cognitive and physical deficits and behaviors that affect mobility  - Identify mobility aids required to assist with transfers and/or ambulation (gait belt, sit-to-stand, lift, walker, cane, etc )  - [place] fall precautions as indicated by assessment  - Record patient progress and toleration of activity level on Mobility SBAR; progress patient to next Phase/Stage  - Instruct patient to call for assistance with activity based on assessment  - Consider rehabilitation consult to assist with strengthening/weightbearing, etc   Outcome: Progressing     Problem: DISCHARGE PLANNING  Goal: Discharge to home or other facility with appropriate resources  Description: INTERVENTIONS:  - Identify barriers to discharge w/patient and caregiver  - Arrange for needed discharge resources and transportation as appropriate  - Identify discharge learning needs (meds, wound care, etc )  - Arrange for interpretive services to assist at discharge as needed  - Refer to Case Management Department for coordinating discharge planning if the patient needs post-hospital services based on physician/advanced practitioner order or complex needs related to functional status, cognitive ability, or social support system  Outcome: Progressing     Problem: Knowledge Deficit  Goal: Patient/family/caregiver demonstrates understanding of disease process, treatment plan, medications, and discharge instructions  Description: Complete learning assessment and assess knowledge base    Interventions:  - Provide teaching at level of understanding  - Provide teaching via preferred learning methods  Outcome: Progressing

## 2021-01-24 NOTE — ED NOTES
Patient transported to [facility], [address] [place] [address]  01/24/21 4188

## 2021-01-24 NOTE — ED NOTES
Report called to [facility] in CCU at this time     Pedro Miller, [facility]  01/24/21 2619

## 2021-01-24 NOTE — PLAN OF CARE
Pt received via stretcher, stand and pivot to transfer to bed, placed on monitor, vs taken, assessment completed, pt on Cardizem Drip which was initiated in ER prior to arrival in ccu, given call rogers  Dr Stefany Morrow present in unit

## 2021-01-24 NOTE — ED PROVIDER NOTES
History  Chief Complaint   Patient presents with    Weakness - Generalized     pt reports generalized weakness, fever at home, decrease in appeitite     72year old female presents with daughter from home for evaluation of generalized weakness  Pt notes she's been having issues since December  Daughter corrects her and states this was mid November  She reports she was having a hard time breathing  Daughter indicates she saw cardiology and had an echo and was told she had CHF  She was placed on diuretic therapy  Pt notes more recently she's been having a cough that she can't get rid of  Is productive at times  Also notes having intermittent fevers since Tuesday  Pt is concerned she is dehydrated as she hasn't been eating or drinking much of anything  Pt also notes h/o diabetes  Took blood sugar today and was 167 at home  Denies congestion  Denies chest pain  Denies N/V/D, abdominal pain  Denies sick contacts  Denies recent travel  Pt noted to be in rapid afib on the monitor during triage  Pt denies h/o afib  No current anticoagulation therapy  PMH includes DM, HTN, CHF  History provided by:  Patient, relative and medical records  History limited by:  Acuity of condition   used: No        Prior to Admission Medications   Prescriptions Last Dose Informant Patient Reported? Taking?    Insulin Syringe-Needle U-100 31G X 15/64" 1 ML MISC 1/24/2021 at Unknown time  No Yes   Sig: Inject under the skin 2 (two) times a day before meals RELION BRAND   acetaminophen (TYLENOL) 325 mg tablet 1/24/2021 at Unknown time  No Yes   Sig: Take 2 tablets by mouth every 6 (six) hours as needed for mild pain, headaches or fever   cholecalciferol (VITAMIN D3) 1,000 units tablet  Self Yes Yes   Sig: Take 2,000 Units by mouth daily   furosemide (LASIX) 40 mg tablet 1/23/2021 at Unknown time  Yes Yes   Sig: Take 40 mg by mouth   insulin NPH-insulin regular (NOVOLIN 70/30 RELION) 100 units/mL subcutaneous injection 1/24/2021 at Unknown time  No Yes   Sig: Inject 15 Units under the skin 2 (two) times a day before meals   insulin regular (HumuLIN R,NovoLIN R) 100 units/mL injection 1/24/2021 at Unknown time  Yes Yes   Sig: once Sliding scale   lisinopril (ZESTRIL) 10 mg tablet 1/23/2021 at Unknown time  No Yes   Sig: Take 1 tablet by mouth daily      Facility-Administered Medications: None       Past Medical History:   Diagnosis Date    [facility]est[facility]) [facility] [address] [facility]     Diabetes mellitus ([facility] )     Hypertension        Past Surgical History:   Procedure Laterality Date    APPENDECTOMY      BREAST LUMPECTOMY Left     CHOLECYSTECTOMY      TOE AMPUTATION Right 11/8/2017    Procedure: Distal phalangectomy, NOT amputation RIGHT THIRD TOE;  Surgeon: Renee Villalta DPM;  Location: [facility];  Service: Podiatry    TOE OSTEOTOMY Right 2/15/2019    Procedure: 5th METATARSAL HEAD RESECTION;  Surgeon: Vilma Niño DPM;  Location: [facility];  Service: Podiatry       History reviewed  No pertinent family history  I have reviewed and agree with the history as documented  E-Cigarette/Vaping    E-Cigarette Use Never User      E-Cigarette/Vaping Substances    Nicotine No     THC No     CBD No     Other No     Unknown No      Social History     Tobacco Use    Smoking status: Never Smoker    Smokeless tobacco: Never Used   Substance Use Topics    Alcohol use: No     Frequency: Never     Drinks per session: Patient refused     Binge frequency: Never    Drug use: No       Review of Systems   Constitutional: Positive for appetite change, fatigue and fever  Negative for chills  HENT: Negative  Negative for congestion, rhinorrhea and sore throat  Eyes: Negative  Negative for visual disturbance  Respiratory: Positive for cough and shortness of breath  Negative for wheezing  Cardiovascular: Negative  Negative for chest pain, palpitations and leg swelling  Gastrointestinal: Negative  Negative for abdominal pain, constipation, diarrhea, nausea and vomiting  Genitourinary: Negative  Negative for dysuria, flank pain, frequency and hematuria  Musculoskeletal: Negative  Negative for back pain and myalgias  Skin: Negative  Negative for rash  Neurological: Negative  Negative for dizziness, light-headedness and headaches  Psychiatric/Behavioral: Negative  Negative for confusion  All other systems reviewed and are negative  Physical Exam  Physical Exam  Vitals signs and nursing note reviewed  Constitutional:       General: She is not in acute distress  Appearance: She is well-developed  She is obese  She is not toxic-appearing  HENT:      Head: Normocephalic and atraumatic  Right Ear: Hearing and external ear normal       Left Ear: Hearing and external ear normal       Nose: Nose normal       Mouth/Throat:      Pharynx: Oropharynx is clear  Uvula midline  No oropharyngeal exudate  Eyes:      General: No scleral icterus  Extraocular Movements: Extraocular movements intact  Conjunctiva/sclera: Conjunctivae normal       Pupils: Pupils are equal, round, and reactive to light  Neck:      Musculoskeletal: Normal range of motion and neck supple  Trachea: Trachea and phonation normal  No tracheal deviation  Cardiovascular:      Rate and Rhythm: Tachycardia present  Rhythm irregularly irregular  Pulses: Normal pulses  Heart sounds: Normal heart sounds, S1 normal and S2 normal  No murmur  Comments: Tachycardic to 140s, rapid afib on the monitor and by exam   Pulmonary:      Effort: Pulmonary effort is normal  No tachypnea or respiratory distress  Breath sounds: Normal breath sounds  No wheezing, rhonchi or rales  Abdominal:      General: Bowel sounds are normal  There is no distension  Palpations: Abdomen is soft  Tenderness: There is no abdominal tenderness  There is no guarding or rebound     Musculoskeletal: Normal range of motion  General: No tenderness  Right lower le+ Edema present  Left lower le+ Edema present  Skin:     General: Skin is warm and dry  Capillary Refill: Capillary refill takes less than 2 seconds  Findings: No rash  Neurological:      General: No focal deficit present  Mental Status: She is alert and oriented to person, place, and time  GCS: GCS eye subscore is 4  GCS verbal subscore is 5  GCS motor subscore is 6  Cranial Nerves: No cranial nerve deficit  Sensory: No sensory deficit  Motor: No abnormal muscle tone     Psychiatric:         Mood and Affect: Mood normal          Speech: Speech normal          Behavior: Behavior normal          Vital Signs  ED Triage Vitals [21 1406]   Temperature Pulse Respirations Blood Pressure SpO2   98 3 °F (36 8 °C) (!) 152 22 147/84 98 %      Temp Source Heart Rate Source Patient Position - Orthostatic VS BP Location FiO2 (%)   Temporal Monitor Sitting Right arm --      Pain Score       No Pain           Vitals:    21 1645 21 1700 21 1715 21 1745   BP: 167/77 145/78 151/85 120/83   Pulse: 101 (!) 135 (!) 129 (!) 121   Patient Position - Orthostatic VS: Sitting Sitting Sitting Sitting         Visual Acuity      ED Medications  Medications   diltiazem (CARDIZEM) injection 20 mg (20 mg Intravenous Given 21 1432)   sodium chloride 0 9 % bolus 250 mL (0 mL Intravenous Stopped 21 1529)   iohexol (OMNIPAQUE) 350 MG/ML injection (SINGLE-DOSE) 85 mL (85 mL Intravenous Given 21 1551)   aspirin tablet 325 mg (325 mg Oral Given 21 1556)   diltiazem (CARDIZEM) 125 mg in sodium chloride 0 9 % 125 mL infusion (7 5 mg/hr Intravenous Rate/Dose Change 21 1758)   benzonatate (TESSALON PERLES) capsule 200 mg (200 mg Oral Given 21 1738)   HYDROcodone-acetaminophen (NORCO) 5-325 mg per tablet 1 tablet (1 tablet Oral Given 21 173)       Diagnostic Studies  Results Reviewed Procedure Component Value Units Date/Time    Ferritin [[id number]] Collected: 01/24/21 1814    Lab Status: In process Specimen: Blood Updated: 01/24/21 1814    CK (with reflex to MB) [[id number]] Collected: 01/24/21 1419    Lab Status: In process Specimen: Blood from Hand, Right Updated: 01/24/21 1808    C-reactive protein [[id number]] Collected: 01/24/21 1419    Lab Status: In process Specimen: Blood from Hand, Right Updated: 01/24/21 1808    Troponin I [[id number]]     Lab Status: No result Specimen: Blood     Procalcitonin with AM Reflex [[id number]] Collected: 01/24/21 1737    Lab Status: In process Specimen: Blood from Arm, Right Updated: 01/24/21 1748    Blood culture #2 [[id number]] Collected: 01/24/21 1737    Lab Status: In process Specimen: Blood from Arm, Left Updated: 01/24/21 1747    Blood culture #1 [[id number]] Collected: 01/24/21 1737    Lab Status: In process Specimen: Blood from Arm, Right Updated: 01/24/21 1747    COVID19, Influenza A/B, RSV PCR, [facility] [[id number]]  (Abnormal) Collected: 01/24/21 1419    Lab Status: Final result Specimen: Nasopharyngeal Swab Updated: 01/24/21 1511     SARS-CoV-2 Positive     INFLUENZA A PCR Negative     INFLUENZA B PCR Negative     RSV PCR Negative    Narrative: This test has been authorized by FDA under an EUA (Emergency Use Assay) for use by authorized laboratories  Clinical caution and judgement should be used with the interpretation of these results with consideration of the clinical impression and other laboratory testing  Testing reported as "Positive" or "Negative" has been proven to be accurate according to standard laboratory validation requirements  All testing is performed with control materials showing appropriate reactivity at standard intervals      D-Dimer [[id number]]  (Abnormal) Collected: 01/24/21 1419    Lab Status: Final result Specimen: Blood from Hand, Right Updated: 01/24/21 1456     D-Dimer, Quant 1 88 ug/ml FEU     APTT [[id number]] (Abnormal) Collected: 01/24/21 1419    Lab Status: Final result Specimen: Blood from Hand, Right Updated: 01/24/21 1456     PTT 38 seconds     Protime-INR [[id number]]  (Normal) Collected: 01/24/21 1419    Lab Status: Final result Specimen: Blood from Hand, Right Updated: 01/24/21 1456     Protime 14 0 seconds      INR 1 10    TSH [[id number]]  (Normal) Collected: 01/24/21 1419    Lab Status: Final result Specimen: Blood from Hand, Right Updated: 01/24/21 1454     TSH 3RD GENERATON 0 892 uIU/mL     Narrative:      Patients undergoing fluorescein dye angiography may retain small amounts of fluorescein in the body for 48-72 hours post procedure  Samples containing fluorescein can produce falsely depressed TSH values  If the patient had this procedure,a specimen should be resubmitted post fluorescein clearance  Magnesium [[id number]]  (Normal) Collected: 01/24/21 1419    Lab Status: Final result Specimen: Blood from Hand, Right Updated: 01/24/21 1454     Magnesium 2 0 mg/dL     NT-BNP PRO [[id number]]  (Abnormal) Collected: 01/24/21 1419    Lab Status: Final result Specimen: Blood from Hand, Right Updated: 01/24/21 1454     NT-proBNP 4,249 pg/mL     Lactic acid [[id number]]  (Normal) Collected: 01/24/21 1419    Lab Status: Final result Specimen: Blood from Hand, Right Updated: 01/24/21 1450     LACTIC ACID 1 2 mmol/L     Narrative:      Result may be elevated if tourniquet was used during collection      Troponin I [[id number]]  (Abnormal) Collected: 01/24/21 1419    Lab Status: Final result Specimen: Blood from Hand, Right Updated: 01/24/21 1448     Troponin I 0 28 ng/mL     Comprehensive metabolic panel [[id number]]  (Abnormal) Collected: 01/24/21 1419    Lab Status: Final result Specimen: Blood from Hand, Right Updated: 01/24/21 1447     Sodium 134 mmol/L      Potassium 3 8 mmol/L      Chloride 100 mmol/L      CO2 26 mmol/L      ANION GAP 8 mmol/L      BUN 21 mg/dL      Creatinine 1 07 mg/dL      Glucose 120 mg/dL Calcium 8 7 mg/dL      Corrected Calcium 9 4 mg/dL      AST 31 U/L      ALT 32 U/L      Alkaline Phosphatase 107 U/L      Total Protein 7 5 g/dL      Albumin 3 1 g/dL      Total Bilirubin 0 50 mg/dL      eGFR 55 ml/min/1 73sq m     Narrative:      [place] guidelines for Chronic Kidney Disease (CKD):     Stage 1 with normal or high GFR (GFR > 90 mL/min/1 73 square meters)    Stage 2 Mild CKD (GFR = 60-89 mL/min/1 73 square meters)    Stage 3A Moderate CKD (GFR = 45-59 mL/min/1 73 square meters)    Stage 3B Moderate CKD (GFR = 30-44 mL/min/1 73 square meters)    Stage 4 Severe CKD (GFR = 15-29 mL/min/1 73 square meters)    Stage 5 End Stage CKD (GFR <15 mL/min/1 73 square meters)  Note: GFR calculation is accurate only with a steady state creatinine    CBC and differential [[id number]]  (Abnormal) Collected: 01/24/21 1419    Lab Status: Final result Specimen: Blood from Hand, Right Updated: 01/24/21 1430     WBC 5 90 Thousand/uL      RBC 3 84 Million/uL      Hemoglobin 10 8 g/dL      Hematocrit 33 4 %      MCV 87 fL      MCH 28 1 pg      MCHC 32 3 g/dL      RDW 14 6 %      MPV 9 6 fL      Platelets 964 Thousands/uL      nRBC 0 /100 WBCs      Neutrophils Relative 73 %      Immat GRANS % 0 %      Lymphocytes Relative 21 %      Monocytes Relative 6 %      Eosinophils Relative 0 %      Basophils Relative 0 %      Neutrophils Absolute 4 29 Thousands/µL      Immature Grans Absolute 0 02 Thousand/uL      Lymphocytes Absolute 1 23 Thousands/µL      Monocytes Absolute 0 34 Thousand/µL      Eosinophils Absolute 0 00 Thousand/µL      Basophils Absolute 0 02 Thousands/µL     UA (URINE) with reflex to Scope [[id number]]     Lab Status: No result Specimen: Urine                  CTA ED chest PE Study   Final Result by Nidhi Martinez MD (01/24 9710)      1  No acute pulmonary embolism  2   Multifocal groundglass opacity likely representing Covid-19 pneumonia        3   Rounded nodular focus in the left lower quadrant, unlikely related to inflammation given morphology  Based on current Fleischner Society 2017 Guidelines on incidental pulmonary nodule, followup non-contrast CT is recommended at 6 months from the    initial examination and, if stable at that time, an additional followup is recommended for 18-24 months from the initial examination  4   Moderate mediastinal lymphadenopathy  While this may be reactive, this degree lymphadenopathy is not typical for COVID-19  This can also be assessed at 6 month follow-up for the lung nodule  The study was marked in [facility] for immediate notification  Workstation performed: [id number]         XR chest 1 view portable    (Results Pending)              Procedures  ECG 12 Lead Documentation Only    Date/Time: 1/24/2021 2:12 PM  Performed by: Durrell Burkitt, PA-C  Authorized by: Durrell Burkitt, PA-C     Indications / Diagnosis:  Tachycardia, dyspnea  ECG reviewed by me, the ED Provider: yes    Patient location:  ED  Previous ECG:     Previous ECG:  Unavailable    Comparison to cardiac monitor: Yes    Interpretation:     Interpretation: abnormal    Rate:     ECG rate:  125    ECG rate assessment: tachycardic    Rhythm:     Rhythm: atrial fibrillation    Ectopy:     Ectopy: none    QRS:     QRS axis:  Right    QRS intervals:  Normal  Conduction:     Conduction: normal    T waves:     T waves: non-specific    Comments:      NE *, QRS 96, QT/QTc 322/464; prior EKG image unavailable, but no documented h/o afib on prior EKG documentation        CriticalCare Time  Performed by: Durrell Burkitt, PA-C  Authorized by: Durrell Burkitt, PA-C     Critical care provider statement:     Critical care time (minutes):  45    Critical care time was exclusive of:  Separately billable procedures and treating other patients    Critical care was necessary to treat or prevent imminent or life-threatening deterioration of the following conditions:  Cardiac failure Critical care was time spent personally by me on the following activities:  Obtaining history from patient or surrogate, development of treatment plan with patient or surrogate, evaluation of patient's response to treatment, examination of patient, interpretation of cardiac output measurements, ordering and performing treatments and interventions, ordering and review of laboratory studies, ordering and review of radiographic studies, re-evaluation of patient's condition and review of old charts    I assumed direction of critical care for this patient from another provider in my specialty: no               ED Course  ED Course as of Jan 24 1844   Sun Jan 24, 2021   1405 Pt in rapid afib; discussed with attending Dr Luli Kim, will give cardizem bolus  No reported h/o afib  1418 Reviewed cardiology visit from 12/1/20  New dx CHF  1439 WBC: 5 90   1439 Reviewed out of network labs from 11/19/20, Hgb 11 5  Hemoglobin(!): 10 8   1439 Platelet Count: 065   1448 Glucose, Random: 120   1448 Creatinine: 1 07   1448 BUN: 21   1448 Sodium(!): 134   1448 Potassium: 3 8   1448 Chloride: 100   1448 CO2: 26   1448 Anion Gap: 8   1448 CORRECTED CALCIUM: 9 4   1448 AST: 31   1448 ALT: 32   1448 Alkaline Phosphatase: 107   1448 Total Protein: 7 5   1448 Albumin(!): 3 1   1448 TOTAL BILIRUBIN: 0 50   1448 eGFR: 55   1451 LACTIC ACID: 1 2   1451 I do not suspect ACS, likely related to rapid afib  Pt denies chest pain  Troponin I(!): 0 28   1458 TSH 3RD GENERATON: 0 892   1458 Magnesium: 2 0   1458 INR: 1 10   1458 Protime: 14 0   1458 PTT(!): 38   1458 Will obtain CTA PE study  D-Dimer, Quant(!): 1 88   1458 NT-proBNP(!): 4,249   1500 Pt and daughter updated on results thus far  She is agreeable to CT imaging  She does report a personal h/o DVT and was on coumadin but she states that was over 20 years ago  She is not on any current anticoagulation at present        1513 SARS-COV-2(!): Positive   1513 INFLU A PCR: Negative   1513 INFLU B PCR: Negative   1513 RSV PCR: Negative   1 Pt and her daughter (who works in healthcare) updated on positive covid results  1603 CTA performed and pending interpretation  1624 IMPRESSION:     1  No acute pulmonary embolism      2   Multifocal groundglass opacity likely representing Covid-19 pneumonia      3  Rounded nodular focus in the left lower quadrant, unlikely related to inflammation given morphology  Based on current Fleischner Society 2017 Guidelines on incidental pulmonary nodule, followup non-contrast CT is recommended at 6 months from the   initial examination and, if stable at that time, an additional followup is recommended for 18-24 months from the initial examination      4  Moderate mediastinal lymphadenopathy  While this may be reactive, this degree lymphadenopathy is not typical for COVID-19  This can also be assessed at 6 month follow-up for the lung nodule  CTA ED chest PE Study   1625 Pt's heart rate had improved to low 100s, now trending upwards and staying 110s-120s  Will start cardizem drip  BP remains stable  O2 sat within normal limits, pt hasn't had any episodes of desaturation and is not having any respiratory distress  Discussed CT results with pt and daughter  Pt complains of cough which she feels put her heart rate up  Also complains of pain in low back since having the CT scan  Will medicate for these symptoms  Pt reluctant to admission but agreeable  [id number] [address]  [employer] Text sent to on call RUBY Chavez to discuss admission  [phone] Dr Halina Chavez accepts for level 2 step down  Recommended procalcitonin and blood culture x2  Pt agreeable to admission and treatment plan  Pt started on cardizem drip for her continued rapid afib  In regards to covid, no supplemental O2 requirements at present  Will admit to step down on telemetry for further monitoring and evaluation  Will trend troponins    I did review lung nodule with pt and her daughter along with lymphadenopathy  A follow up CT scan was recommended in 6 months per radiology  They verbalized understanding and had no further questions at this time  HEART Risk Score      Most Recent Value   Heart Score Risk Calculator   History  0 Filed at: 01/24/2021 1447   ECG  1 Filed at: 01/24/2021 1447   Age  2 Filed at: 01/24/2021 1447   Risk Factors  1 Filed at: 01/24/2021 1447   Troponin  2 Filed at: 01/24/2021 1447   HEART Score  6 Filed at: 01/24/2021 1447                      SBIRT 22yo+      Most Recent Value   SBIRT (25 yo +)   In order to provide better care to our patients, we are screening all of our patients for alcohol and drug use  Would it be okay to ask you these screening questions? Yes Filed at: 01/24/2021 1416   Initial Alcohol Screen: US AUDIT-C    1  How often do you have a drink containing alcohol?  0 Filed at: 01/24/2021 1416   2  How many drinks containing alcohol do you have on a typical day you are drinking? 0 Filed at: 01/24/2021 1416   3a  Male UNDER 65: How often do you have five or more drinks on one occasion? 0 Filed at: 01/24/2021 1416   3b  FEMALE Any Age, or MALE 65+: How often do you have 4 or more drinks on one occassion? 0 Filed at: 01/24/2021 1416   Audit-C Score  0 Filed at: 01/24/2021 1416   ANAID: How many times in the past year have you    Used an illegal drug or used a prescription medication for non-medical reasons?   Never Filed at: 01/24/2021 1416          Wells' Criteria for PE      Most Recent Value   Wells' Criteria for PE   Clinical signs and symptoms of DVT  0 Filed at: 01/24/2021 1500   PE is primary diagnosis or equally likely  3 Filed at: 01/24/2021 1500   HR >100  1 5 Filed at: 01/24/2021 1500   Immobilization at least 3 days or Surgery in the previous 4 weeks  0 Filed at: 01/24/2021 1500   Previous, objectively diagnosed PE or DVT  1 5 Filed at: 01/24/2021 1500   Hemoptysis  0 Filed at: 01/24/2021 1500   Malignancy with treatment within 6 months or palliative  0 Filed at: 01/24/2021 1500   Len' Criteria Total  6 Filed at: 01/24/2021 1500                [facility]  Number of Diagnoses or Management Options  Atrial fibrillation with rapid ventricular response [facility]): new and requires workup  CHF (congestive heart failure) ([facility] ): established and worsening  COVID-19 virus infection: new and requires workup  Elevated troponin: new and requires workup  Lung nodule: new and requires workup  Mediastinal lymphadenopathy: new and requires workup     Amount and/or Complexity of Data Reviewed  Clinical lab tests: ordered and reviewed  Tests in the radiology section of CPT®: ordered and reviewed  Decide to obtain previous medical records or to obtain history from someone other than the patient: yes  Obtain history from someone other than the patient: yes  Review and summarize past medical records: yes  Discuss the patient with other providers: yes (Attending, [facility])  Independent visualization of images, tracings, or specimens: yes    Risk of Complications, Morbidity, and/or Mortality  Presenting problems: high  Diagnostic procedures: high  Management options: high    Patient Progress  Patient progress: stable      Disposition  Final diagnoses:   Atrial fibrillation with rapid ventricular response ([facility] )   COVID-19 virus infection   CHF (congestive heart failure) ([place] [id number] )   Elevated troponin   Lung nodule   Mediastinal lymphadenopathy     Time reflects when diagnosis was documented in both MDM as applicable and the Disposition within this note     Time User Action Codes Description Comment    1/24/2021  3:22 PM Maylon Cramp Add [I48 91] Atrial fibrillation with rapid ventricular response ([facility] )     1/24/2021  3:22 PM Maylon Cramp Add [U07 1] COVID-19 virus infection     1/24/2021  3:22 PM Maylon Cramp Add [I50 9] CHF (congestive heart failure) ([facility] )     1/24/2021  3:22 PM Maylon Cramp Add [R77 8] Elevated troponin     1/24/2021  5:53 PM Ольга Ingrid Retort [R91 1] Lung nodule     1/24/2021  5:53 PM Brianne Villagomez Add [R59 0] Mediastinal lymphadenopathy       ED Disposition     ED Disposition Condition Date/Time Comment    Admit Stable Sun Jan 24, 2021  4:49 PM Case was discussed with Dr Joanna Mascorro and the patient's admission status was agreed to be Admission Status: inpatient status to the service of Dr Joanna Mascorro  Follow-up Information    None         Current Discharge Medication List      CONTINUE these medications which have NOT CHANGED    Details   acetaminophen (TYLENOL) 325 mg tablet Take 2 tablets by mouth every 6 (six) hours as needed for mild pain, headaches or fever  Qty: 30 tablet, Refills: 0    Comments: Do not exceed a total of 3 grams of tylenol/acetaminophen in a 24-hour period  cholecalciferol (VITAMIN D3) 1,000 units tablet Take 2,000 Units by mouth daily      furosemide (LASIX) 40 mg tablet Take 40 mg by mouth      insulin NPH-insulin regular (NOVOLIN 70/30 RELION) 100 units/mL subcutaneous injection Inject 15 Units under the skin 2 (two) times a day before meals  Qty: 10 mL, Refills: 0    Comments: Take with breakfast and with dinner  insulin regular (HumuLIN R,NovoLIN R) 100 units/mL injection once Sliding scale      Insulin Syringe-Needle U-100 31G X 15/64" 1 ML MISC Inject under the skin 2 (two) times a day before meals RELION BRAND  Qty: 100 each, Refills: 0      lisinopril (ZESTRIL) 10 mg tablet Take 1 tablet by mouth daily  Qty: 30 tablet, Refills: 0    Comments: This will treat your blood pressure and protect your kidneys from the effects of diabetes  No discharge procedures on file      PDMP Review       Value Time User    PDMP Reviewed  Yes 1/24/2021  5:50 PM Vik Hernandez PA-C          ED Provider  Electronically Signed by           Vik Hernandez PA-C  01/24/21 6654

## 2021-01-24 NOTE — ED NOTES
Unsuccessful with IV insertion at this time x2  Charge nurse, Mauricio Barragan made aware and [facility]       Zara Garza, [facility]  01/24/21 9510

## 2021-01-24 NOTE — ED NOTES
Pharmacy made aware of cardizem order   Waiting for delivery to infuse     [place], RN  01/24/21 8771

## 2021-01-25 LAB
ALBUMIN SERPL BCP-MCNC: 2.9 G/DL (ref 3.5–5)
ALP SERPL-CCNC: 95 U/L (ref 46–116)
ALT SERPL W P-5'-P-CCNC: 30 U/L (ref 12–78)
ANION GAP SERPL CALCULATED.3IONS-SCNC: 9 MMOL/L (ref 4–13)
AST SERPL W P-5'-P-CCNC: 23 U/L (ref 5–45)
ATRIAL RATE: 166 BPM
BASOPHILS # BLD AUTO: 0.03 THOUSANDS/ΜL (ref 0–0.1)
BASOPHILS NFR BLD AUTO: 0 % (ref 0–1)
BILIRUB SERPL-MCNC: 0.4 MG/DL (ref 0.2–1)
BUN SERPL-MCNC: 17 MG/DL (ref 5–25)
CALCIUM ALBUM COR SERPL-MCNC: 9.3 MG/DL (ref 8.3–10.1)
CALCIUM SERPL-MCNC: 8.4 MG/DL (ref 8.3–10.1)
CHLORIDE SERPL-SCNC: 102 MMOL/L (ref 100–108)
CK MB SERPL-MCNC: <1 % (ref 0–2.5)
CK MB SERPL-MCNC: <1 NG/ML (ref 0–5)
CK SERPL-CCNC: 156 U/L (ref 26–192)
CO2 SERPL-SCNC: 24 MMOL/L (ref 21–32)
CREAT SERPL-MCNC: 0.96 MG/DL (ref 0.6–1.3)
CRP SERPL QL: 90 MG/L
D DIMER PPP FEU-MCNC: 1.62 UG/ML FEU
EOSINOPHIL # BLD AUTO: 0.01 THOUSAND/ΜL (ref 0–0.61)
EOSINOPHIL NFR BLD AUTO: 0 % (ref 0–6)
ERYTHROCYTE [DISTWIDTH] IN BLOOD BY AUTOMATED COUNT: 14.6 % (ref 11.6–15.1)
EST. AVERAGE GLUCOSE BLD GHB EST-MCNC: 157 MG/DL
FERRITIN SERPL-MCNC: 252 NG/ML (ref 8–388)
FERRITIN SERPL-MCNC: 266 NG/ML (ref 8–388)
GFR SERPL CREATININE-BSD FRML MDRD: 62 ML/MIN/1.73SQ M
GLUCOSE SERPL-MCNC: 124 MG/DL (ref 65–140)
GLUCOSE SERPL-MCNC: 126 MG/DL (ref 65–140)
GLUCOSE SERPL-MCNC: 130 MG/DL (ref 65–140)
GLUCOSE SERPL-MCNC: 171 MG/DL (ref 65–140)
GLUCOSE SERPL-MCNC: 172 MG/DL (ref 65–140)
HBA1C MFR BLD: 7.1 %
HCT VFR BLD AUTO: 32.5 % (ref 34.8–46.1)
HGB BLD-MCNC: 10.5 G/DL (ref 11.5–15.4)
IMM GRANULOCYTES # BLD AUTO: 0.03 THOUSAND/UL (ref 0–0.2)
IMM GRANULOCYTES NFR BLD AUTO: 0 % (ref 0–2)
LYMPHOCYTES # BLD AUTO: 1.42 THOUSANDS/ΜL (ref 0.6–4.47)
LYMPHOCYTES NFR BLD AUTO: 21 % (ref 14–44)
MAGNESIUM SERPL-MCNC: 2.1 MG/DL (ref 1.6–2.6)
MCH RBC QN AUTO: 28.3 PG (ref 26.8–34.3)
MCHC RBC AUTO-ENTMCNC: 32.3 G/DL (ref 31.4–37.4)
MCV RBC AUTO: 88 FL (ref 82–98)
MONOCYTES # BLD AUTO: 0.49 THOUSAND/ΜL (ref 0.17–1.22)
MONOCYTES NFR BLD AUTO: 7 % (ref 4–12)
NEUTROPHILS # BLD AUTO: 4.7 THOUSANDS/ΜL (ref 1.85–7.62)
NEUTS SEG NFR BLD AUTO: 72 % (ref 43–75)
NRBC BLD AUTO-RTO: 0 /100 WBCS
NT-PROBNP SERPL-MCNC: 2955 PG/ML
PHOSPHATE SERPL-MCNC: 3.4 MG/DL (ref 2.3–4.1)
PLATELET # BLD AUTO: 265 THOUSANDS/UL (ref 149–390)
PMV BLD AUTO: 9.6 FL (ref 8.9–12.7)
POTASSIUM SERPL-SCNC: 3.9 MMOL/L (ref 3.5–5.3)
PROCALCITONIN SERPL-MCNC: 0.08 NG/ML
PROCALCITONIN SERPL-MCNC: 0.1 NG/ML
PROT SERPL-MCNC: 7.1 G/DL (ref 6.4–8.2)
QRS AXIS: 92 DEGREES
QRSD INTERVAL: 96 MS
QT INTERVAL: 322 MS
QTC INTERVAL: 464 MS
RBC # BLD AUTO: 3.71 MILLION/UL (ref 3.81–5.12)
SODIUM SERPL-SCNC: 135 MMOL/L (ref 136–145)
T WAVE AXIS: -1 DEGREES
TROPONIN I SERPL-MCNC: 0.2 NG/ML
TSH SERPL DL<=0.05 MIU/L-ACNC: 0.77 UIU/ML (ref 0.36–3.74)
VENTRICULAR RATE: 125 BPM
WBC # BLD AUTO: 6.68 THOUSAND/UL (ref 4.31–10.16)

## 2021-01-25 PROCEDURE — 99222 1ST HOSP IP/OBS MODERATE 55: CPT | Performed by: INTERNAL MEDICINE

## 2021-01-25 PROCEDURE — 85379 FIBRIN DEGRADATION QUANT: CPT | Performed by: INTERNAL MEDICINE

## 2021-01-25 PROCEDURE — 82550 ASSAY OF CK (CPK): CPT | Performed by: INTERNAL MEDICINE

## 2021-01-25 PROCEDURE — 82553 CREATINE MB FRACTION: CPT | Performed by: INTERNAL MEDICINE

## 2021-01-25 PROCEDURE — 84443 ASSAY THYROID STIM HORMONE: CPT | Performed by: INTERNAL MEDICINE

## 2021-01-25 PROCEDURE — 93010 ELECTROCARDIOGRAM REPORT: CPT | Performed by: INTERNAL MEDICINE

## 2021-01-25 PROCEDURE — 99232 SBSQ HOSP IP/OBS MODERATE 35: CPT | Performed by: FAMILY MEDICINE

## 2021-01-25 PROCEDURE — 84100 ASSAY OF PHOSPHORUS: CPT | Performed by: INTERNAL MEDICINE

## 2021-01-25 PROCEDURE — 84145 PROCALCITONIN (PCT): CPT | Performed by: INTERNAL MEDICINE

## 2021-01-25 PROCEDURE — 83880 ASSAY OF NATRIURETIC PEPTIDE: CPT | Performed by: INTERNAL MEDICINE

## 2021-01-25 PROCEDURE — 85025 COMPLETE CBC W/AUTO DIFF WBC: CPT | Performed by: INTERNAL MEDICINE

## 2021-01-25 PROCEDURE — 86140 C-REACTIVE PROTEIN: CPT | Performed by: INTERNAL MEDICINE

## 2021-01-25 PROCEDURE — 82728 ASSAY OF FERRITIN: CPT | Performed by: INTERNAL MEDICINE

## 2021-01-25 PROCEDURE — 80053 COMPREHEN METABOLIC PANEL: CPT | Performed by: INTERNAL MEDICINE

## 2021-01-25 PROCEDURE — 82948 REAGENT STRIP/BLOOD GLUCOSE: CPT

## 2021-01-25 PROCEDURE — 84484 ASSAY OF TROPONIN QUANT: CPT | Performed by: INTERNAL MEDICINE

## 2021-01-25 PROCEDURE — 83735 ASSAY OF MAGNESIUM: CPT | Performed by: INTERNAL MEDICINE

## 2021-01-25 RX ORDER — ASPIRIN 81 MG/1
81 TABLET ORAL DAILY
Status: DISCONTINUED | OUTPATIENT
Start: 2021-01-25 | End: 2021-01-26 | Stop reason: HOSPADM

## 2021-01-25 RX ORDER — GUAIFENESIN 600 MG
600 TABLET, EXTENDED RELEASE 12 HR ORAL EVERY 12 HOURS SCHEDULED
Status: DISCONTINUED | OUTPATIENT
Start: 2021-01-25 | End: 2021-01-26 | Stop reason: HOSPADM

## 2021-01-25 RX ADMIN — GUAIFENESIN 600 MG: 600 TABLET ORAL at 09:47

## 2021-01-25 RX ADMIN — Medication 2000 UNITS: at 09:47

## 2021-01-25 RX ADMIN — ASPIRIN 81 MG: 81 TABLET, COATED ORAL at 09:47

## 2021-01-25 RX ADMIN — METOPROLOL TARTRATE 25 MG: 25 TABLET, FILM COATED ORAL at 09:49

## 2021-01-25 RX ADMIN — INSULIN LISPRO 1 UNITS: 100 INJECTION, SOLUTION INTRAVENOUS; SUBCUTANEOUS at 11:16

## 2021-01-25 RX ADMIN — METOPROLOL TARTRATE 25 MG: 25 TABLET, FILM COATED ORAL at 21:10

## 2021-01-25 RX ADMIN — GUAIFENESIN 600 MG: 600 TABLET ORAL at 21:10

## 2021-01-25 RX ADMIN — APIXABAN 5 MG: 5 TABLET, FILM COATED ORAL at 09:47

## 2021-01-25 RX ADMIN — INSULIN ASPART 15 UNITS: 100 INJECTION, SUSPENSION SUBCUTANEOUS at 16:27

## 2021-01-25 RX ADMIN — OXYCODONE HYDROCHLORIDE AND ACETAMINOPHEN 1000 MG: 500 TABLET ORAL at 09:46

## 2021-01-25 RX ADMIN — INSULIN ASPART 15 UNITS: 100 INJECTION, SUSPENSION SUBCUTANEOUS at 08:01

## 2021-01-25 RX ADMIN — ZINC SULFATE 220 MG (50 MG) CAPSULE 220 MG: CAPSULE at 09:46

## 2021-01-25 RX ADMIN — OXYCODONE HYDROCHLORIDE AND ACETAMINOPHEN 1000 MG: 500 TABLET ORAL at 21:10

## 2021-01-25 RX ADMIN — INSULIN LISPRO 1 UNITS: 100 INJECTION, SOLUTION INTRAVENOUS; SUBCUTANEOUS at 16:27

## 2021-01-25 RX ADMIN — DILTIAZEM HYDROCHLORIDE 7.5 MG/HR: 5 INJECTION INTRAVENOUS at 05:52

## 2021-01-25 RX ADMIN — APIXABAN 5 MG: 5 TABLET, FILM COATED ORAL at 17:45

## 2021-01-25 RX ADMIN — METOPROLOL TARTRATE 25 MG: 25 TABLET, FILM COATED ORAL at 16:27

## 2021-01-25 NOTE — INCIDENTAL FINDINGS
The following findings require follow up:  Radiographic finding   Finding:  Rounded nodular focus in the left lower quadrant   Follow up required: Non Contrast CT    Follow up should be done within 6 month(s)    Please notify the following clinician to assist with the follow up:   Family doctor or primary care physician

## 2021-01-25 NOTE — MALNUTRITION/BMI
This medical record reflects morbid obesity  Malnutrition Findings:              BMI Findings:  Adult BMI Classifications: Morbid Obesity 40-44 9     Body mass index is 43 2 kg/m²  Diet education is not appropriate at current time, patient + COVID  She has changed her diet in past and blood sugars in much better control  See Nutrition note dated 1/25/2021 for additional details  Completed nutrition assessment is viewable in the nutrition documentation

## 2021-01-25 NOTE — PLAN OF CARE
Problem: Potential for Falls  Goal: Patient will remain free of falls  Description: INTERVENTIONS:  - Assess patient frequently for physical needs  -  Identify cognitive and physical deficits and behaviors that affect risk of falls    -  [place] fall precautions as indicated by assessment   - Educate patient/family on patient safety including physical limitations  - Instruct patient to call for assistance with activity based on assessment  - Modify environment to reduce risk of injury  - Consider OT/PT consult to assist with strengthening/mobility  Outcome: Progressing     Problem: CARDIOVASCULAR - ADULT  Goal: Maintains optimal cardiac output and hemodynamic stability  Description: INTERVENTIONS:  - Monitor I/O, vital signs and rhythm  - Monitor for S/S and trends of decreased cardiac output  - Administer and titrate ordered vasoactive medications to optimize hemodynamic stability  - Assess quality of pulses, skin color and temperature  - Assess for signs of decreased coronary artery perfusion  - Instruct patient to report change in severity of symptoms  Outcome: Progressing  Goal: Absence of cardiac dysrhythmias or at baseline rhythm  Description: INTERVENTIONS:  - Continuous cardiac monitoring, vital signs, obtain 12 lead EKG if ordered  - Administer antiarrhythmic and heart rate control medications as ordered  - Monitor electrolytes and administer replacement therapy as ordered  Outcome: Progressing     Problem: GASTROINTESTINAL - ADULT  Goal: Maintains or returns to baseline bowel function  Description: INTERVENTIONS:  - Assess bowel function  - Encourage oral fluids to ensure adequate hydration  - Administer IV fluids if ordered to ensure adequate hydration  - Administer ordered medications as needed  - Encourage mobilization and activity  - Consider nutritional services referral to assist patient with adequate nutrition and appropriate food choices  Outcome: Progressing  Goal: Maintains adequate nutritional intake  Description: INTERVENTIONS:  - Monitor percentage of each meal consumed  - Identify factors contributing to decreased intake, treat as appropriate  - Assist with meals as needed  - Monitor I&O, weight, and lab values if indicated  - Obtain nutrition services referral as needed  Outcome: Progressing     Problem: RESPIRATORY - ADULT  Goal: Achieves optimal ventilation and oxygenation  Description: INTERVENTIONS:  - Assess for changes in respiratory status  - Assess for changes in mentation and behavior  - Position to facilitate oxygenation and minimize respiratory effort  - Oxygen administered by appropriate delivery if ordered  - Initiate smoking cessation education as indicated  - Encourage broncho-pulmonary hygiene including cough, deep breathe, Incentive Spirometry  - Assess the need for suctioning and aspirate as needed  - Assess and instruct to report SOB or any respiratory difficulty  - Respiratory Therapy support as indicated  Outcome: Progressing     Problem: GENITOURINARY - ADULT  Goal: Maintains or returns to baseline urinary function  Description: INTERVENTIONS:  - Assess urinary function  - Encourage oral fluids to ensure adequate hydration if ordered  - Administer IV fluids as ordered to ensure adequate hydration  - Administer ordered medications as needed  - Offer frequent toileting  - Follow urinary retention protocol if ordered  Outcome: Progressing     Problem: METABOLIC, FLUID AND ELECTROLYTES - ADULT  Goal: Electrolytes maintained within normal limits  Description: INTERVENTIONS:  - Monitor labs and assess patient for signs and symptoms of electrolyte imbalances  - Administer electrolyte replacement as ordered  - Monitor response to electrolyte replacements, including repeat lab results as appropriate  - Instruct patient on fluid and nutrition as appropriate  Outcome: Progressing  Goal: Fluid balance maintained  Description: INTERVENTIONS:  - Monitor labs   - Monitor I/O and WT  - Instruct patient on fluid and nutrition as appropriate  - Assess for signs & symptoms of volume excess or deficit  Outcome: Progressing  Goal: Glucose maintained within target range  Description: INTERVENTIONS:  - Monitor Blood Glucose as ordered  - Assess for signs and symptoms of hyperglycemia and hypoglycemia  - Administer ordered medications to maintain glucose within target range  - Assess nutritional intake and initiate nutrition service referral as needed  Outcome: Progressing     Problem: SKIN/TISSUE INTEGRITY - ADULT  Goal: Skin integrity remains intact  Description: INTERVENTIONS  - Identify patients at risk for skin breakdown  - Assess and monitor skin integrity  - Assess and monitor nutrition and hydration status  - Monitor labs (i e  albumin)  - Assess for incontinence   - Turn and reposition patient  - Assist with mobility/ambulation  - Relieve pressure over bony prominences  - Avoid friction and shearing  - Provide appropriate hygiene as needed including keeping skin clean and dry  - Evaluate need for skin moisturizer/barrier cream  - Collaborate with interdisciplinary team (i e  Nutrition, Rehabilitation, etc )   - Patient/family teaching  Outcome: Progressing  Goal: Incision(s), wounds(s) or drain site(s) healing without S/S of infection  Description: INTERVENTIONS  - Assess and document risk factors for skin impairment   - Assess and document dressing, incision, wound bed, drain sites and surrounding tissue  - Consider nutrition services referral as needed  - Oral mucous membranes remain intact  - Provide patient/ family education  Outcome: Progressing  Goal: Oral mucous membranes remain intact  Description: INTERVENTIONS  - Assess oral mucosa and hygiene practices  - Implement preventative oral hygiene regimen  - Implement oral medicated treatments as ordered  - Initiate Nutrition services referral as needed  Outcome: Progressing     Problem: MUSCULOSKELETAL - ADULT  Goal: Maintain or return mobility to safest level of function  Description: INTERVENTIONS:  - Assess patient's ability to carry out ADLs; assess patient's baseline for ADL function and identify physical deficits which impact ability to perform ADLs (bathing, care of mouth/teeth, toileting, grooming, dressing, etc )  - Assess/evaluate cause of self-care deficits   - Assess range of motion  - Assess patient's mobility  - Assess patient's need for assistive devices and provide as appropriate  - Encourage maximum independence but intervene and supervise when necessary  - Involve family in performance of ADLs  - Assess for home care needs following discharge   - Consider OT consult to assist with ADL evaluation and planning for discharge  - Provide patient education as appropriate  Outcome: Progressing     Problem: PAIN - ADULT  Goal: Verbalizes/displays adequate comfort level or baseline comfort level  Description: Interventions:  - Encourage patient to monitor pain and request assistance  - Assess pain using appropriate pain scale  - Administer analgesics based on type and severity of pain and evaluate response  - Implement non-pharmacological measures as appropriate and evaluate response  - Consider cultural and social influences on pain and pain management  - Notify physician/advanced practitioner if interventions unsuccessful or patient reports new pain  Outcome: Progressing     Problem: INFECTION - ADULT  Goal: Absence or prevention of progression during hospitalization  Description: INTERVENTIONS:  - Assess and monitor for signs and symptoms of infection  - Monitor lab/diagnostic results  - Monitor all insertion sites, i e  indwelling lines, tubes, and drains  - Monitor endotracheal if appropriate and nasal secretions for changes in amount and color  - [place] appropriate cooling/warming therapies per order  - Administer medications as ordered  - Instruct and encourage patient and family to use good hand hygiene technique  - Identify and instruct in appropriate isolation precautions for identified infection/condition  Outcome: Progressing     Problem: SAFETY ADULT  Goal: Patient will remain free of falls  Description: INTERVENTIONS:  - Assess patient frequently for physical needs  -  Identify cognitive and physical deficits and behaviors that affect risk of falls    -  [place] fall precautions as indicated by assessment   - Educate patient/family on patient safety including physical limitations  - Instruct patient to call for assistance with activity based on assessment  - Modify environment to reduce risk of injury  - Consider OT/PT consult to assist with strengthening/mobility  Outcome: Progressing  Goal: Maintain or return to baseline ADL function  Description: INTERVENTIONS:  -  Assess patient's ability to carry out ADLs; assess patient's baseline for ADL function and identify physical deficits which impact ability to perform ADLs (bathing, care of mouth/teeth, toileting, grooming, dressing, etc )  - Assess/evaluate cause of self-care deficits   - Assess range of motion  - Assess patient's mobility; develop plan if impaired  - Assess patient's need for assistive devices and provide as appropriate  - Encourage maximum independence but intervene and supervise when necessary  - Involve family in performance of ADLs  - Assess for home care needs following discharge   - Consider OT consult to assist with ADL evaluation and planning for discharge  - Provide patient education as appropriate  Outcome: Progressing  Goal: Maintain or return mobility status to optimal level  Description: INTERVENTIONS:  - Assess patient's baseline mobility status (ambulation, transfers, stairs, etc )    - Identify cognitive and physical deficits and behaviors that affect mobility  - Identify mobility aids required to assist with transfers and/or ambulation (gait belt, sit-to-stand, lift, walker, cane, etc )  - [place] fall precautions as indicated by assessment  - Record patient progress and toleration of activity level on Mobility SBAR; progress patient to next Phase/Stage  - Instruct patient to call for assistance with activity based on assessment  - Consider rehabilitation consult to assist with strengthening/weightbearing, etc   Outcome: Progressing     Problem: DISCHARGE PLANNING  Goal: Discharge to home or other facility with appropriate resources  Description: INTERVENTIONS:  - Identify barriers to discharge w/patient and caregiver  - Arrange for needed discharge resources and transportation as appropriate  - Identify discharge learning needs (meds, wound care, etc )  - Arrange for interpretive services to assist at discharge as needed  - Refer to Case Management Department for coordinating discharge planning if the patient needs post-hospital services based on physician/advanced practitioner order or complex needs related to functional status, cognitive ability, or social support system  Outcome: Progressing     Problem: Knowledge Deficit  Goal: Patient/family/caregiver demonstrates understanding of disease process, treatment plan, medications, and discharge instructions  Description: Complete learning assessment and assess knowledge base    Interventions:  - Provide teaching at level of understanding  - Provide teaching via preferred learning methods  Outcome: Progressing

## 2021-01-25 NOTE — PROGRESS NOTES
Progress Note - Charissa Jacobo 1955, 72 y o  female MRN: [id number]    Unit/Bed#:  Encounter: [id number]    Primary Care Provider: Alva Bender MD   Date and time admitted to hospital: 1/24/2021  1:59 PM        * Pneumonia due to COVID-19 virus  Assessment & Plan  Abnormal CT chest, patient without hypoxia    Patient does not require oxygen, start treatment via mild protocol      CT findings as outlined below will require repeat imaging of the chest in 6 months   Moderate mediastinal lymphadenopathy  While this may be reactive, this degree lymphadenopathy is not typical for COVID-19  This can also be assessed at 6 month follow-up for the lung nodule  There is a rounded nodular focus in the left lower lobe measuring 6 mm on series 3, image 68  There is no tracheal or endobronchial lesion  Atrial fibrillation with rapid ventricular response (HCC)  Assessment & Plan  Chads Vasc score of 5  Cardiology input appreciated 2D echo today  Started on Cardizem infusion on admission, will attempt to titrate off  Started on metoprolol 25 mg P q 8          Elevated troponin  Assessment & Plan  Likely non MI troponin elevation the setting AFib with RVR    Chronic diastolic congestive heart failure (HCC)  Assessment & Plan  Wt Readings from Last 3 Encounters:   01/25/21 125 kg (275 lb 12 7 oz)   02/15/19 118 kg (260 lb)   11/09/17 118 kg (259 lb 7 7 oz)       EF reported to be 50% in the [facility] with mitral regurg  Continue Lasix 40 mg daily         Hyponatremia  Assessment & Plan  Resolved        Progress Note - Charissa Jacobo 72 y o  female MRN: [id number]    Unit/Bed#:  Encounter: [id number]        Subjective:   Patient seen and examined at bedside she offers no acute complaints    Objective:     Vitals:   Vitals:    01/25/21 1115   BP: 144/67   Pulse: 104   Resp: (!) 27   Temp:    SpO2: 93%     Body mass index is 43 2 kg/m²      Intake/Output Summary (Last 24 hours) at 1/25/2021 1119  Last data filed at 1/25/2021 0900  Gross per 24 hour   Intake 990 ml   Output 700 ml   Net 290 ml       Physical Exam:   /67 (BP Location: Left arm)   Pulse 104   Temp 98 2 °F (36 8 °C) (Tympanic)   Resp (!) 27   Ht 5' 7" (1 702 m)   Wt 125 kg (275 lb 12 7 oz)   SpO2 93%   BMI 43 20 kg/m²      General appearance: alert and oriented, in no acute distress and morbidly obese  Head: Normocephalic, without obvious abnormality, atraumatic  Lungs: clear to auscultation bilaterally  Heart:  Irregularly irregular  Abdomen: soft, non-tender; bowel sounds normal; no masses,  no organomegaly  Extremities: extremities normal, warm and well-perfused; no cyanosis, clubbing, or edema  Pulses: 2+ and symmetric  Neurologic: Grossly normal     Invasive Devices     Peripheral Intravenous Line            Peripheral IV 01/24/21 Left Wrist less than 1 day    Peripheral IV 01/24/21 Right Arm less than 1 day                Results from last 7 days   Lab Units 01/25/21  0626 01/24/21  1419   WBC Thousand/uL 6 68 5 90   HEMOGLOBIN g/dL 10 5* 10 8*   HEMATOCRIT % 32 5* 33 4*   PLATELETS Thousands/uL 265 244       Results from last 7 days   Lab Units 01/25/21  0626 01/24/21  1419   POTASSIUM mmol/L 3 9 3 8   CHLORIDE mmol/L 102 100   CO2 mmol/L 24 26   BUN mg/dL 17 21   CREATININE mg/dL 0 96 1 07   CALCIUM mg/dL 8 4 8 7   ALK PHOS U/L 95 107   ALT U/L 30 32   AST U/L 23 31       Medication Administration - last 24 hours from 01/24/2021 1119 to 01/25/2021 1119       Date/Time Order Dose Route Action Action by     01/24/2021 1432 diltiazem (CARDIZEM) injection 20 mg 20 mg Intravenous Given Esperanza Negron RN     01/24/2021 1529 sodium chloride 0 9 % bolus 250 mL 0 mL Intravenous Stopped Esperanza Negron RN     01/24/2021 1429 sodium chloride 0 9 % bolus 250 mL 250 mL Intravenous New Bag Esperanza Negron RN     01/24/2021 1551 iohexol (OMNIPAQUE) 350 MG/ML injection (SINGLE-DOSE) 85 mL 85 mL Intravenous Given Darius Davidson     01/24/2021 1556 aspirin tablet 325 mg 325 mg Oral Given Kathrin Necessary, RN     01/24/2021 1758 diltiazem (CARDIZEM) 125 mg in sodium chloride 0 9 % 125 mL infusion 7 5 mg/hr Intravenous Rate/Dose Change Kathrin Necessary, RN     01/24/2021 1743 diltiazem (CARDIZEM) 125 mg in sodium chloride 0 9 % 125 mL infusion 5 mg/hr Intravenous New Bag Kathrin Necessary, RN     01/24/2021 1738 benzonatate (TESSALON PERLES) capsule 200 mg 200 mg Oral Given Kathrin Necessary, RN     01/24/2021 1738 HYDROcodone-acetaminophen (NORCO) 5-325 mg per tablet 1 tablet 1 tablet Oral Given Kathrin Necessary, RN     01/25/2021 0801 insulin aspart protamine-insulin aspart (NovoLOG 70/30) 100 units/mL subcutaneous injection 15 Units 15 Units Subcutaneous Given Rimma Nguyen RN     01/25/2021 0749 insulin aspart protamine-insulin aspart (NovoLOG 70/30) 100 units/mL subcutaneous injection 15 Units 15 Units Subcutaneous Canceled Entry Rimma Nguyen RN     01/25/2021 0408 sodium chloride 0 9 % infusion 0 mL/hr Intravenous Stopped David Cordero RN     01/24/2021 2130 sodium chloride 0 9 % infusion 75 mL/hr Intravenous [address] [facility], [address] [place] [address]     01/25/2021 1116 insulin lispro (HumaLOG) 100 units/mL subcutaneous injection 1-5 Units 1 Units Subcutaneous Given Rimma Nguyen RN     01/25/2021 0750 insulin lispro (HumaLOG) 100 units/mL subcutaneous injection 1-5 Units 1 Units Subcutaneous Not Given Rimma Nguyen RN     01/24/2021 2203 insulin lispro (HumaLOG) 100 units/mL subcutaneous injection 1-5 Units 1 Units Subcutaneous Not Given David Cordero RN     01/25/2021 0021 cholecalciferol (VITAMIN D3) tablet 2,000 Units 2,000 Units Oral Given Rimma Nguyen RN     01/25/2021 9734 ascorbic acid (VITAMIN C) tablet 1,000 mg 1,000 mg Oral Given Rimma Nguyen RN     01/24/2021 2131 ascorbic acid (VITAMIN C) tablet 1,000 mg 1,000 mg Oral Given David Cordero RN     01/25/2021 0946 zinc sulfate (ZINCATE) capsule 220 mg 220 mg Oral Given Oneal Beltran RN     01/25/2021 0292 apixaban (ELIQUIS) tablet 5 mg 5 mg Oral Given Oneal Beltran RN     01/24/2021 2131 apixaban (ELIQUIS) tablet 5 mg 5 mg Oral Given Cholo Salazar RN     01/25/2021 9655 diltiazem (CARDIZEM) 125 mg in sodium chloride 0 9 % 125 mL infusion 7 5 mg/hr Intravenous [address]     01/24/2021 2024 diltiazem (CARDIZEM) 125 mg in sodium chloride 0 9 % 125 mL infusion 7 5 mg/hr Intravenous [address]     01/25/2021 0947 guaiFENesin (MUCINEX) 12 hr tablet 600 mg 600 mg Oral Given Oneal Beltran RN     01/25/2021 7118 aspirin (ECOTRIN LOW STRENGTH) EC tablet 81 mg 81 mg Oral Given Oneal Beltran RN     01/25/2021 0949 metoprolol tartrate (LOPRESSOR) tablet 25 mg 25 mg Oral Given Oneal Beltran RN     01/25/2021 1023 metoprolol tartrate (LOPRESSOR) tablet 25 mg 25 mg Oral Not Given Oneal Beltran RN            Lab, Imaging and other studies: I have personally reviewed pertinent reports      VTE Pharmacologic Prophylaxis: eliquis  VTE Mechanical Prophylaxis: sequential compression device     Nneka Regan MD  1/25/2021,11:19 AM

## 2021-01-25 NOTE — H&P
H&P- Rupesh Alaniz 1955, 72 y o  female MRN: [id number]    Unit/Bed#:  Encounter: [id number]    Primary Care Provider: Citlali Monae MD   Date and time admitted to hospital: 1/24/2021  1:59 PM        * Pneumonia due to COVID-19 virus  Assessment & Plan  Abnormal CT chest, patient without hypoxia    Patient does not require oxygen, start treatment via mild protocol      CT findings as outlined below will require repeat imaging of the chest in 6 months   Moderate mediastinal lymphadenopathy  While this may be reactive, this degree lymphadenopathy is not typical for COVID-19  This can also be assessed at 6 month follow-up for the lung nodule  There is a rounded nodular focus in the left lower lobe measuring 6 mm on series 3, image 68  There is no tracheal or endobronchial lesion  Atrial fibrillation with rapid ventricular response (HCC)  Assessment & Plan  Initiated on Cardizem drip for rate control, will maintain Cardizem drip overnight, can likely transition to oral regimen tomorrow    Patient appears to be mildly volume depleted, will give 1 L of normal saline at 75 mL/hour overnight    Start p o  Eliquis for anticoagulation, CHADS2 Vasc score of 5    Chronic diastolic congestive heart failure (HCC)  Assessment & Plan  Wt Readings from Last 3 Encounters:   01/24/21 124 kg (274 lb 4 oz)   02/15/19 118 kg (260 lb)   11/09/17 118 kg (259 lb 7 7 oz)       EF reported to be 50% in the [place] [id number] in Care everywhere    Magaly Montes MD - 12/03/2020 5:35 PM EST   echocardiogram which shows a preserved LVEF and mild to moderate mitral regurgitation  Prox ascending aorta is mildly dilated at 3 8 cm  Feeling better with PO lasix 40 mg daily   Advised to maintain a weight log         Elevated troponin  Assessment & Plan  Likely non MI troponin elevation the setting AFib with RVR    Type 2 diabetes mellitus with hyperglycemia [facility])  Assessment & Plan  Lab Results   Component Value Date HGBA1C 12 0 (H) 11/06/2017       No results for input(s): POCGLU in the last 72 hours  Blood Sugar Average: Last 72 hrs:   resume home regimen, patient will have hemoglobin A1c checked    Hyponatremia  Assessment & Plan  Mild, give fluid and follow up a m  Labs, patient appears hypovolemic    Essential hypertension  Assessment & Plan  History of hypertension on Lasix and lisinopril, will hold both    Can likely restart lisinopril tomorrow    VTE Prophylaxis: Apixaban (Eliquis)  / sequential compression device and reason for no mechanical VTE prophylaxis Not indicated   Code Status:  Full code  POLST: There is no POLST form on file for this patient (pre-hospital)  Discussion with family:  Family was not updated    Anticipated Length of Stay:  Patient will be admitted on an Inpatient basis with an anticipated length of stay of  greater than 2 midnights  Justification for Hospital Stay:  AFib with RVR    Total Time for Visit, including Counseling / Coordination of Care: 1 hour  Greater than 50% of this total time spent on direct patient counseling and coordination of care  Chief Complaint:   Generalized weakness, decreased appetite and fever since last Tuesday  History of Present Illness:    Jaelyn Dunbra is a 72 y o  female who presents with fever, poor p o  Intake    Patient reports history of lower extremity edema bilaterally, has had ongoing cough since November, outpatient evaluation was completed and patient was diagnosed with CHF, seen by [facility]  Patient reports fever, poor p o  Intake for about 1 week, had previous negative COVID-19 test, as she is positive for COVID-19 with abnormal CT of the chest     Patient found have atrial fibrillation with rapid ventricular response, was treated with IV Cardizem, only mild improvement in heart rate, patient admitted for heart rate control, has no signs of respiratory failure, minimal COVID-19 symptoms      Patient reports productive cough, denies chest pain, no lightheadedness dizziness, no syncope or presyncope  Only recent medication addition was p o  Lasix, has been on lisinopril for many years, patient says she missed several doses due to poor p o  Intake, she felt as though she was dehydrated  Review of Systems:    Review of Systems   Constitutional: Positive for appetite change and fever  Eyes: Negative  Respiratory: Negative  Cardiovascular: Negative  Negative for leg swelling (Decreased leg swelling last couple of weeks since starting Lasix)  Endocrine: Negative  Genitourinary: Negative  Musculoskeletal: Negative  Neurological: Negative  Psychiatric/Behavioral: Negative  All other systems reviewed and are negative  Past Medical and Surgical History:     Past Medical History:   Diagnosis Date    [facility]congestive [facility] failure) ([facility] )     Diabetes mellitus ([facility] )     Hypertension            Meds/Allergies:    Prior to Admission medications    Medication Sig Start Date End Date Taking?  Authorizing Provider   acetaminophen (TYLENOL) 325 mg tablet Take 2 tablets by mouth every 6 (six) hours as needed for mild pain, headaches or fever 11/9/17  Yes Kira Flores, DO   cholecalciferol (VITAMIN D3) 1,000 units tablet Take 2,000 Units by mouth daily   Yes Historical Provider, MD   furosemide (LASIX) 40 mg tablet Take 40 mg by mouth 12/1/20  Yes Historical Provider, MD   insulin NPH-insulin regular (NOVOLIN 70/30 RELION) 100 units/mL subcutaneous injection Inject 15 Units under the skin 2 (two) times a day before meals 11/9/17  Yes Kira Flores, DO   insulin regular (HumuLIN R,NovoLIN R) 100 units/mL injection once Sliding scale   Yes Historical Provider, MD   Insulin Syringe-Needle U-100 31G X 15/64" 1 ML MISC Inject under the skin 2 (two) times a day before meals RELION BRAND 11/9/17  Yes Kira Flores, DO   lisinopril (ZESTRIL) 10 mg tablet Take 1 tablet by mouth daily 11/10/17  Yes Rich Whitney DO Vishal   ergocalciferol (VITAMIN D2) 50,000 units Take 1 capsule by mouth once a week for 7 doses 11/14/17 1/24/21  René InternationalDO     I have reviewed home medications with patient personally  Allergies: Allergies   Allergen Reactions    Penicillins Swelling     Penicillin K    Sulfa Antibiotics Swelling       Social History:     Marital Status: Single     Substance Use History:   Social History     Substance and Sexual Activity   Alcohol Use Never    Alcohol/week: 0 0 standard drinks    Frequency: Never    Drinks per session: Patient refused    Binge frequency: Never    Comment: 0     Social History     Tobacco Use   Smoking Status Never Smoker   Smokeless Tobacco Never Used     Social History     Substance and Sexual Activity   Drug Use No       Family History:    non-contributory    Physical Exam:     Vitals:   Blood Pressure: 103/59 (01/24/21 1844)  Pulse: 103 (01/24/21 1844)  Temperature: 98 3 °F (36 8 °C) (01/24/21 1406)  Temp Source: Temporal (01/24/21 1406)  Respirations: 20 (01/24/21 1844)  Height: 5' 7" (170 2 cm) (01/24/21 1844)  Weight - Scale: 124 kg (274 lb 4 oz) (01/24/21 1844)  SpO2: 100 % (01/24/21 1844)    Physical Exam  Vitals signs and nursing note reviewed  Constitutional:       General: She is not in acute distress  Appearance: She is obese  She is not ill-appearing, toxic-appearing or diaphoretic  HENT:      Head: Normocephalic and atraumatic  Nose: Nose normal    Neck:      Musculoskeletal: Normal range of motion  Cardiovascular:      Rate and Rhythm: Tachycardia present  Rhythm irregular  Pulmonary:      Effort: Pulmonary effort is normal    Musculoskeletal:      Right lower leg: No edema  Left lower leg: No edema  Skin:     General: Skin is warm and dry  Coloration: Skin is pale  Findings: No bruising, erythema, lesion or rash  Neurological:      General: No focal deficit present        Mental Status: She is alert and oriented to person, place, and time  Mental status is at baseline  Cranial Nerves: No cranial nerve deficit  Psychiatric:         Mood and Affect: Mood normal          Behavior: Behavior normal          Thought Content: Thought content normal          Judgment: Judgment normal          Additional Data:     Lab Results: I have personally reviewed pertinent reports  Results from last 7 days   Lab Units 01/24/21  1419   WBC Thousand/uL 5 90   HEMOGLOBIN g/dL 10 8*   HEMATOCRIT % 33 4*   PLATELETS Thousands/uL 244   NEUTROS PCT % 73   LYMPHS PCT % 21   MONOS PCT % 6   EOS PCT % 0     Results from last 7 days   Lab Units 01/24/21  1419   SODIUM mmol/L 134*   POTASSIUM mmol/L 3 8   CHLORIDE mmol/L 100   CO2 mmol/L 26   BUN mg/dL 21   CREATININE mg/dL 1 07   ANION GAP mmol/L 8   CALCIUM mg/dL 8 7   ALBUMIN g/dL 3 1*   TOTAL BILIRUBIN mg/dL 0 50   ALK PHOS U/L 107   ALT U/L 32   AST U/L 31   GLUCOSE RANDOM mg/dL 120     Results from last 7 days   Lab Units 01/24/21  1419   INR  1 10             Results from last 7 days   Lab Units 01/24/21  1419   LACTIC ACID mmol/L 1 2       Imaging: I have personally reviewed pertinent reports  CTA ED chest PE Study   Final Result by Fariba Rollins MD (01/24 1620)      1  No acute pulmonary embolism  2   Multifocal groundglass opacity likely representing Covid-19 pneumonia  3   Rounded nodular focus in the left lower quadrant, unlikely related to inflammation given morphology  Based on current Fleischner Society 2017 Guidelines on incidental pulmonary nodule, followup non-contrast CT is recommended at 6 months from the    initial examination and, if stable at that time, an additional followup is recommended for 18-24 months from the initial examination  4   Moderate mediastinal lymphadenopathy  While this may be reactive, this degree lymphadenopathy is not typical for COVID-19  This can also be assessed at 6 month follow-up for the lung nodule        The study was marked in EPIC for immediate notification  Workstation performed: [id number]         XR chest 1 view portable    (Results Pending)       EKG, Pathology, and Other Studies Reviewed on Admission:   · EKG:  AFib with RVR    Allscripts / Epic Records Reviewed: Yes     ** Please Note: This note has been constructed using a voice recognition system   **

## 2021-01-25 NOTE — CONSULTS
Consultation - Cardiology   Brice Mahmood 72 y o  female MRN: [id number]  Unit/Bed#:  Encounter: [id number]    [address]      Physician Requesting Consult: Lolly Silveira MD  Reason for Consult / Principal Problem:  AFib    History of Present Illness   HPI: Brice Mahmood is a 72y o  year old female who has a history of diastolic congestive heart failure, hypertension, diabetes who follows with Dr Angelo Hawthorne for cardiology at [facility]  Patient has active COVID-19 pneumonia interview was done over the telephone  Patient states she had a fever and cough for about 5 days denies any exertional chest pressure heaviness  She denies any palpitations, lightheadedness, dizziness, or syncope  She presented to the emergency department for COVID testing and was found to be COVID positive  In the emergency room EKG was performed which showed atrial fibrillation with rapid ventricular response  IV Cardizem was initiated for rate control overnight  She is currently feeling comfortable with no complaints other than a cough  Anticoagulation was initiated with Eliquis 5 mg p o  B i d   Patient tells me that she has no insurance for prescription coverage and is concerned about cost of this medicine  She tells me that her prior cardiologist had talked about screening for sleep apnea but she wanted to hold off  Review of Systems   Constitution: Positive for fever  HENT: Negative  Eyes: Negative  Cardiovascular: Negative  Respiratory: Positive for cough  Endocrine: Negative  Hematologic/Lymphatic: Negative  Skin: Negative  Musculoskeletal: Negative  Gastrointestinal: Negative  Genitourinary: Negative  Neurological: Negative  Psychiatric/Behavioral: Negative  All other systems reviewed and are negative      Historical Information   Past Medical History:   Diagnosis Date    [facility]congest[facility]) [facility] )     Diabetes mellitus ([facility] )     Hypertension Social History     Substance and Sexual Activity   Alcohol Use Never    Alcohol/week: 0 0 standard drinks    Frequency: Never    Drinks per session: Patient refused    Binge frequency: Never    Comment: 0     Social History     Substance and Sexual Activity   Drug Use No     Social History     Tobacco Use   Smoking Status Never Smoker   Smokeless Tobacco Never Used     Family History: non-contributory    Meds/Allergies   all current active meds have been reviewed  diltiazem, 1-15 mg/hr, Last Rate: 7 5 mg/hr (01/25/21 0552)        Allergies   Allergen Reactions    Penicillins Swelling     Penicillin K    Sulfa Antibiotics Swelling       Objective   Vitals: Blood pressure 129/63, pulse (!) 108, temperature 98 2 °F (36 8 °C), temperature source Tympanic, resp  rate 17, height 5' 7" (1 702 m), weight 125 kg (275 lb 12 7 oz), SpO2 95 %  , Body mass index is 43 2 kg/m² ,   Orthostatic Blood Pressures      Most Recent Value   Blood Pressure  129/63 filed at 01/25/2021 0730   Patient Position - Orthostatic VS  Lying filed at 01/25/2021 2114        Systolic (70UBQ), ZPM:202 , Min:97 , PDS:914     Diastolic (64WFO), GAL:37, Min:52, Max:103      Intake/Output Summary (Last 24 hours) at 1/25/2021 9616  Last data filed at 1/25/2021 0730  Gross per 24 hour   Intake 990 ml   Output 550 ml   Net 440 ml       Weight (last 2 days)     Date/Time   Weight    01/25/21 0600   125 (275 8)    01/24/21 1844   124 (274 25)    01/24/21 1406   125 (275)              Invasive Devices     Peripheral Intravenous Line            Peripheral IV 01/24/21 Left Wrist less than 1 day    Peripheral IV 01/24/21 Right Arm less than 1 day                  Physical Exam  Physical Exam:  Vital signs reviewed  General:  Alert and cooperative, appears stated age, no acute distress  HEENT:  No scleral icterus, no conjunctival pallor  Neck:  Normal range of motion  Lungs:  No respiratory distress, breathing unlabored  Abdomen:  No evidence of distension   Extremities:  Normal range of motion  No clubbing, cyanosis or edema   Skin:  No rashes or lesions on exposed skin  Neurologic:  Cranial nerves II-XII grossly intact without focal deficits  Psych:  Normal mood and affect          Laboratory Results:  Results from last 7 days   Lab Units 01/25/21  0626 01/24/21  2202 01/24/21  1849 01/24/21  1419   CK TOTAL U/L 156  --   --  222*   TROPONIN I ng/mL 0 20* 0 29* 0 30* 0 28*   CK MB INDEX % <1 0  --   --  <1 0       CBC with diff:   Results from last 7 days   Lab Units 01/25/21  0626 01/24/21  1419   WBC Thousand/uL 6 68 5 90   HEMOGLOBIN g/dL 10 5* 10 8*   HEMATOCRIT % 32 5* 33 4*   MCV fL 88 87   PLATELETS Thousands/uL 265 244   MCH pg 28 3 28 1   MCHC g/dL 32 3 32 3   RDW % 14 6 14 6   MPV fL 9 6 9 6   NRBC AUTO /100 WBCs 0 0         CMP:  Results from last 7 days   Lab Units 01/25/21  0626 01/24/21  1419   POTASSIUM mmol/L 3 9 3 8   CHLORIDE mmol/L 102 100   CO2 mmol/L 24 26   BUN mg/dL 17 21   CREATININE mg/dL 0 96 1 07   CALCIUM mg/dL 8 4 8 7   AST U/L 23 31   ALT U/L 30 32   ALK PHOS U/L 95 107   EGFR ml/min/1 73sq m 62 55         BMP:  Results from last 7 days   Lab Units 01/25/21  0626 01/24/21  1419   POTASSIUM mmol/L 3 9 3 8   CHLORIDE mmol/L 102 100   CO2 mmol/L 24 26   BUN mg/dL 17 21   CREATININE mg/dL 0 96 1 07   CALCIUM mg/dL 8 4 8 7       BNP:  No results for input(s): BNP in the last 72 hours  Magnesium:   Results from last 7 days   Lab Units 01/25/21  0626 01/24/21  1419   MAGNESIUM mg/dL 2 1 2 0       Coags:   Results from last 7 days   Lab Units 01/24/21  1419   PTT seconds 38*   INR  1 10       TSH:       Hemoglobin A1C       Lipid Profile:         Cardiac testing:   No results found for this or any previous visit  No results found for this or any previous visit  No results found for this or any previous visit  No results found for this or any previous visit        Imaging: I have personally reviewed pertinent films in PACS  Xr Chest 1 View Portable    Result Date: 1/25/2021  Narrative: CHEST INDICATION:   cough  Patient has confirmed COVID-19  COMPARISON:  Subsequent CT EXAM PERFORMED/VIEWS:  XR CHEST PORTABLE Single image FINDINGS:  Lungs are adequately aerated  Patchy parenchymal opacities in both lungs most pronounced in the right inferior hilum and right lung base Cardiac size top normal   Mild vascular prominence with mild peribronchial thickening  Atherosclerotic aorta Osseous structures appear within normal limits for patient age  Impression: Patchy infiltrates in both lungs  In the setting of clinically suspected/proven COVID-19, this plain film appearance while nonspecific, can be seen in cases of viral pneumonia such as COVID-19  Vascular congestion  Mild interstitial edema  Workstation performed: [id number]     Cta Ed Chest Pe Study    Result Date: 1/24/2021  Narrative: CTA - CHEST WITH IV CONTRAST - PULMONARY ANGIOGRAM INDICATION:   Shortness of breath PE suspected, intermediate prob, positive D-dimer dyspnea, positive d dimer, r/o PE  Patient has confirmed COVID-19  COMPARISON: None  TECHNIQUE: CTA examination of the chest was performed using angiographic technique according to a protocol specifically tailored to evaluate for pulmonary embolism  Axial, sagittal, and coronal 2D reformatted images were created from the source data and  submitted for interpretation  In addition, coronal 3D MIP postprocessing was performed on the acquisition scanner  Radiation dose length product (DLP) for this visit:  550 mGy-cm   This examination, like all CT scans performed in the [facility], was performed utilizing techniques to minimize radiation dose exposure, including the use of iterative reconstruction and automated exposure control  IV Contrast:  85 mL of iohexol (OMNIPAQUE)  FINDINGS: PULMONARY ARTERIAL TREE:  No pulmonary embolus is seen   LUNGS:  There is multifocal groundglass opacity likely representing COVID-19 pneumonia  There is a rounded nodular focus in the left lower lobe measuring 6 mm on series 3, image 68  There is no tracheal or endobronchial lesion  PLEURA:  Unremarkable  HEART/GREAT VESSELS:  Unremarkable for patient's age  MEDIASTINUM AND SHIREEN:  There is moderate mediastinal lymphadenopathy  The largest lymph node is a right paratracheal lymph node measuring approximately 2 0 x 2 3 cm  CHEST WALL AND LOWER NECK:   Unremarkable  VISUALIZED STRUCTURES IN THE UPPER ABDOMEN:  Unremarkable  OSSEOUS STRUCTURES:  No acute fracture or destructive osseous lesion  Impression: 1  No acute pulmonary embolism  2   Multifocal groundglass opacity likely representing Covid-19 pneumonia  3   Rounded nodular focus in the left lower quadrant, unlikely related to inflammation given morphology  Based on current Fleischner Society 2017 Guidelines on incidental pulmonary nodule, followup non-contrast CT is recommended at 6 months from the initial examination and, if stable at that time, an additional followup is recommended for 18-24 months from the initial examination  4   Moderate mediastinal lymphadenopathy  While this may be reactive, this degree lymphadenopathy is not typical for COVID-19  This can also be assessed at 6 month follow-up for the lung nodule  The study was marked in [facility] for immediate notification  Workstation performed: [id number]       EKG reviewed personally:  Atrial fibrillation nonspecific ST and T changes  Telemetry reviewed personally:  Atrial fibrillation heart rates around 100 on IV Cardizem    Assessment:  Principal Problem:    Pneumonia due to COVID-19 virus  Active Problems:    Hyponatremia    Type 2 diabetes mellitus with hyperglycemia (HCC)    Essential hypertension    Atrial fibrillation with rapid ventricular response (HCC)    Chronic diastolic congestive heart failure (HCC)    Elevated troponin      Assesment/ Plan:  1  New onset Atrial fibrillation chads Vasc score 5  2   COVID-19 pneumonia  3  Poorly controlled diabetes  4  Hypertension  5  Chronic diastolic congestive heart failure  6  Mild to moderate mitral regurgitation  7  Preserved left ventricular ejection fraction 50%   8  Non MI elevated troponin in the setting of COVID pneumonia and rapid atrial fibrillation   9  Coronary calcifications on CT  10  Hyperlipidemia    Recommendations:  Start the patient on oral metoprolol can wean Cardizem drip to off  Continue full anticoagulation with Eliquis  Would continue 81 mg of aspirin due to the patient being a diabetic and having moderate coronary calcifications on CT scan  Recent echocardiogram was reviewed no need for any further testing  Will continue to observe blood pressure while in the hospital   Recommend outpatient sleep apnea testing  Patient had refused statin in recent office visit can follow-up as an outpatient regarding this  Counseling / Coordination of Care  Total floor / unit time spent today 35 minutes  Greater than 50% of total time was spent with the patient and / or family counseling and / or coordination of care  A description of the counseling / coordination of care: Manuel Mondragon         Code Status: Level 1 - Full Code

## 2021-01-25 NOTE — CASE MANAGEMENT
Cm met with the patient to evaluate the patients prior function and living situation and any barriers to d/c and form a safe d/c plan  Cm also evaluated the patient for any services in the home or needs for services  Patient states she lives alone in a house w/2 BRANDEE's and 2 flight of teps inside  Independent w/ADL's  Uses a cane to assist w/ambultion when outside  Has a glucometer and checks her BS daily  She does work She does not drive and states her family drives her to her appointments and will transport her home on discharge  She never had Home care  Her PCP is Nimesh grant PA-C in [place] w/Matthew  She uses [facility] in [place] or [facility] in [place]  CM will follow with any discharge needs

## 2021-01-26 VITALS
HEIGHT: 67 IN | SYSTOLIC BLOOD PRESSURE: 152 MMHG | TEMPERATURE: 97.7 F | DIASTOLIC BLOOD PRESSURE: 76 MMHG | HEART RATE: 101 BPM | WEIGHT: 276.02 LBS | BODY MASS INDEX: 43.32 KG/M2 | RESPIRATION RATE: 19 BRPM | OXYGEN SATURATION: 97 %

## 2021-01-26 LAB
ANION GAP SERPL CALCULATED.3IONS-SCNC: 7 MMOL/L (ref 4–13)
BUN SERPL-MCNC: 18 MG/DL (ref 5–25)
CALCIUM SERPL-MCNC: 8.6 MG/DL (ref 8.3–10.1)
CHLORIDE SERPL-SCNC: 100 MMOL/L (ref 100–108)
CO2 SERPL-SCNC: 28 MMOL/L (ref 21–32)
CREAT SERPL-MCNC: 0.99 MG/DL (ref 0.6–1.3)
CRP SERPL QL: 85.6 MG/L
GFR SERPL CREATININE-BSD FRML MDRD: 60 ML/MIN/1.73SQ M
GLUCOSE SERPL-MCNC: 127 MG/DL (ref 65–140)
GLUCOSE SERPL-MCNC: 127 MG/DL (ref 65–140)
GLUCOSE SERPL-MCNC: 180 MG/DL (ref 65–140)
MAGNESIUM SERPL-MCNC: 2.3 MG/DL (ref 1.6–2.6)
POTASSIUM SERPL-SCNC: 3.8 MMOL/L (ref 3.5–5.3)
PROCALCITONIN SERPL-MCNC: 0.06 NG/ML
SODIUM SERPL-SCNC: 135 MMOL/L (ref 136–145)

## 2021-01-26 PROCEDURE — 80048 BASIC METABOLIC PNL TOTAL CA: CPT | Performed by: FAMILY MEDICINE

## 2021-01-26 PROCEDURE — 84145 PROCALCITONIN (PCT): CPT | Performed by: PHYSICIAN ASSISTANT

## 2021-01-26 PROCEDURE — 99239 HOSP IP/OBS DSCHRG MGMT >30: CPT | Performed by: FAMILY MEDICINE

## 2021-01-26 PROCEDURE — 83735 ASSAY OF MAGNESIUM: CPT | Performed by: FAMILY MEDICINE

## 2021-01-26 PROCEDURE — 82948 REAGENT STRIP/BLOOD GLUCOSE: CPT

## 2021-01-26 PROCEDURE — 86140 C-REACTIVE PROTEIN: CPT | Performed by: FAMILY MEDICINE

## 2021-01-26 RX ORDER — MULTIVITAMIN/IRON/FOLIC ACID 18MG-0.4MG
1 TABLET ORAL DAILY
Qty: 7 TABLET | Refills: 0 | Status: SHIPPED | OUTPATIENT
Start: 2021-02-01 | End: 2022-06-21

## 2021-01-26 RX ORDER — ZINC SULFATE 50(220)MG
220 CAPSULE ORAL DAILY
Qty: 5 CAPSULE | Refills: 0 | Status: SHIPPED | OUTPATIENT
Start: 2021-01-27 | End: 2021-02-01

## 2021-01-26 RX ADMIN — ASPIRIN 81 MG: 81 TABLET, COATED ORAL at 08:10

## 2021-01-26 RX ADMIN — GUAIFENESIN 600 MG: 600 TABLET ORAL at 08:10

## 2021-01-26 RX ADMIN — ZINC SULFATE 220 MG (50 MG) CAPSULE 220 MG: CAPSULE at 08:10

## 2021-01-26 RX ADMIN — OXYCODONE HYDROCHLORIDE AND ACETAMINOPHEN 1000 MG: 500 TABLET ORAL at 08:10

## 2021-01-26 RX ADMIN — METOPROLOL TARTRATE 25 MG: 25 TABLET, FILM COATED ORAL at 08:11

## 2021-01-26 RX ADMIN — INSULIN LISPRO 1 UNITS: 100 INJECTION, SOLUTION INTRAVENOUS; SUBCUTANEOUS at 11:03

## 2021-01-26 RX ADMIN — Medication 2000 UNITS: at 08:10

## 2021-01-26 RX ADMIN — APIXABAN 5 MG: 5 TABLET, FILM COATED ORAL at 08:11

## 2021-01-26 RX ADMIN — INSULIN ASPART 15 UNITS: 100 INJECTION, SUSPENSION SUBCUTANEOUS at 08:09

## 2021-01-26 NOTE — CASE MANAGEMENT
Pt is being discharged today  Pt is trying to find a ride home  I gave the patient a coupon for Eliquis   AVS and discharge instructions just reviewed with patient with a good understanding  Case Management reviewed discharge planning process including the following: identifying help that is needed at home, pt's preference for discharge needs and Meds at [facility]  Reviewed with Pt that any member of the healthcare team can answer questions regarding : medications, jmportance of recognizing  Signs and symptoms of any  medical problems  Case Management also encouraged pt to follow up with all recommended appointments after discharge

## 2021-01-26 NOTE — NURSING NOTE
Discharge instructions given to patient  No questions regarding medications  Instructed to call PCP if she has any problems with bleeding  Small spots of blood on tissue when she blows her nose at this time   Instructed to follow up CT scan of chest

## 2021-01-26 NOTE — DISCHARGE SUMMARY
Discharge Summary - Margot Villegas 72 y o  female MRN: [id number]    Unit/Bed#:  Encounter: [id number]    Admission Date:   Admission Orders (From admission, onward)     Ordered        01/24/21 1650  Inpatient Admission  Once                     Admitting Diagnosis: Cough [R05]  CHF (congestive heart failure) ([facility] [address] ) [I50 9]  Lung nodule [R91 1]  Mediastinal lymphadenopathy [R59 0]  Elevated troponin [R77 8]  Fever [R50 9]  Atrial fibrillation with rapid ventricular response ([facility] [address] ) [I48 91]  COVID-19 virus infection [U07 1]    HPI: Margot Villegas is a 72 y o  female who presents with fever, poor p o  Intake     Patient reports history of lower extremity edema bilaterally, has had ongoing cough since November, outpatient evaluation was completed and patient was diagnosed with CHF, seen by [facility]iology      Patient reports fever, poor p o  Intake for about 1 week, had previous negative COVID-19 test, as she is positive for COVID-19 with abnormal CT of the chest      Patient found have atrial fibrillation with rapid ventricular response, was treated with IV Cardizem, only mild improvement in heart rate, patient admitted for heart rate control, has no signs of respiratory failure, minimal COVID-19 symptoms      Patient reports productive cough, denies chest pain, no lightheadedness dizziness, no syncope or presyncope      Only recent medication addition was p o  Lasix, has been on lisinopril for many years, patient says she missed several doses due to poor p o  Intake, she felt as though she was dehydrated  Procedures Performed:   Orders Placed This Encounter   Procedures   [address] ED ECG Documentation Only       Summary of Hospital Course:     New onset atrial fibrillation with rapid ventricular response    The patient presented to the emergency room with fever and poor p o   Intake, she was diagnosed with COVID and found to be in atrial fibrillation with rapid ventricular response  She was admitted to the ICU for titratable Cardizem drip and seen in consultation by Cardiology  She was transitioned from Cardizem to oral metoprolol, titrated to 50 mg p o  B i d  on discharge  Eliquis was initiated for anticoagulation  The patient is established with Cardiology at [facility], and has a prescheduled appointment established    COVID-19 virus detected    Patient did not require supplemental oxygen, she was treated with multivitamin and zinc, she will follow-up with her PCP for a tele visit    Significant Findings, Care, Treatment and Services Provided:     CT      1   No acute pulmonary embolism  2   Multifocal groundglass opacity likely representing Covid-19 pneumonia  3   Rounded nodular focus in the left lower quadrant, unlikely related to inflammation given morphology  Based on current Fleischner Society 2017 Guidelines on incidental pulmonary nodule, followup non-contrast CT is recommended at 6 months from the   initial examination and, if stable at that time, an additional followup is recommended for 18-24 months from the initial examination  4   Moderate mediastinal lymphadenopathy  While this may be reactive, this degree lymphadenopathy is not typical for COVID-19  This can also be assessed at 6 month follow-up for the lung nodule  Complications: none    Discharge Diagnosis: see above    Resolved Problems  Date Reviewed: 1/24/2021    None          Condition at Discharge: fair         Discharge instructions/Information to patient and family:   See after visit summary for information provided to patient and family  Provisions for Follow-Up Care:  See after visit summary for information related to follow-up care and any pertinent home health orders  PCP: Kary Persaud MD    Disposition: Home    Planned Readmission: No      Discharge Statement   I spent 40 minutes discharging the patient  This time was spent on the day of discharge   I had direct contact with the patient on the day of discharge  Additional documentation is required if more than 30 minutes were spent on discharge  Discharge Medications:  See after visit summary for reconciled discharge medications provided to patient and family

## 2021-01-26 NOTE — PLAN OF CARE
Problem: Potential for Falls  Goal: Patient will remain free of falls  Description: INTERVENTIONS:  - Assess patient frequently for physical needs  -  Identify cognitive and physical deficits and behaviors that affect risk of falls    -  [place] fall precautions as indicated by assessment   - Educate patient/family on patient safety including physical limitations  - Instruct patient to call for assistance with activity based on assessment  - Modify environment to reduce risk of injury  - Consider OT/PT consult to assist with strengthening/mobility  Outcome: Progressing     Problem: CARDIOVASCULAR - ADULT  Goal: Maintains optimal cardiac output and hemodynamic stability  Description: INTERVENTIONS:  - Monitor I/O, vital signs and rhythm  - Monitor for S/S and trends of decreased cardiac output  - Administer and titrate ordered vasoactive medications to optimize hemodynamic stability  - Assess quality of pulses, skin color and temperature  - Assess for signs of decreased coronary artery perfusion  - Instruct patient to report change in severity of symptoms  Outcome: Progressing  Goal: Absence of cardiac dysrhythmias or at baseline rhythm  Description: INTERVENTIONS:  - Continuous cardiac monitoring, vital signs, obtain 12 lead EKG if ordered  - Administer antiarrhythmic and heart rate control medications as ordered  - Monitor electrolytes and administer replacement therapy as ordered  Outcome: Progressing     Problem: RESPIRATORY - ADULT  Goal: Achieves optimal ventilation and oxygenation  Description: INTERVENTIONS:  - Assess for changes in respiratory status  - Assess for changes in mentation and behavior  - Position to facilitate oxygenation and minimize respiratory effort  - Oxygen administered by appropriate delivery if ordered  - Initiate smoking cessation education as indicated  - Encourage broncho-pulmonary hygiene including cough, deep breathe, Incentive Spirometry  - Assess the need for suctioning and aspirate as needed  - Assess and instruct to report SOB or any respiratory difficulty  - Respiratory Therapy support as indicated  Outcome: Progressing     Problem: GASTROINTESTINAL - ADULT  Goal: Maintains or returns to baseline bowel function  Description: INTERVENTIONS:  - Assess bowel function  - Encourage oral fluids to ensure adequate hydration  - Administer IV fluids if ordered to ensure adequate hydration  - Administer ordered medications as needed  - Encourage mobilization and activity  - Consider nutritional services referral to assist patient with adequate nutrition and appropriate food choices  Outcome: Progressing  Goal: Maintains adequate nutritional intake  Description: INTERVENTIONS:  - Monitor percentage of each meal consumed  - Identify factors contributing to decreased intake, treat as appropriate  - Assist with meals as needed  - Monitor I&O, weight, and lab values if indicated  - Obtain nutrition services referral as needed  Outcome: Progressing     Problem: GENITOURINARY - ADULT  Goal: Maintains or returns to baseline urinary function  Description: INTERVENTIONS:  - Assess urinary function  - Encourage oral fluids to ensure adequate hydration if ordered  - Administer IV fluids as ordered to ensure adequate hydration  - Administer ordered medications as needed  - Offer frequent toileting  - Follow urinary retention protocol if ordered  Outcome: Progressing     Problem: METABOLIC, FLUID AND ELECTROLYTES - ADULT  Goal: Electrolytes maintained within normal limits  Description: INTERVENTIONS:  - Monitor labs and assess patient for signs and symptoms of electrolyte imbalances  - Administer electrolyte replacement as ordered  - Monitor response to electrolyte replacements, including repeat lab results as appropriate  - Instruct patient on fluid and nutrition as appropriate  Outcome: Progressing  Goal: Fluid balance maintained  Description: INTERVENTIONS:  - Monitor labs   - Monitor I/O and WT  - Instruct patient on fluid and nutrition as appropriate  - Assess for signs & symptoms of volume excess or deficit  Outcome: Progressing  Goal: Glucose maintained within target range  Description: INTERVENTIONS:  - Monitor Blood Glucose as ordered  - Assess for signs and symptoms of hyperglycemia and hypoglycemia  - Administer ordered medications to maintain glucose within target range  - Assess nutritional intake and initiate nutrition service referral as needed  Outcome: Progressing     Problem: SKIN/TISSUE INTEGRITY - ADULT  Goal: Skin integrity remains intact  Description: INTERVENTIONS  - Identify patients at risk for skin breakdown  - Assess and monitor skin integrity  - Assess and monitor nutrition and hydration status  - Monitor labs (i e  albumin)  - Assess for incontinence   - Turn and reposition patient  - Assist with mobility/ambulation  - Relieve pressure over bony prominences  - Avoid friction and shearing  - Provide appropriate hygiene as needed including keeping skin clean and dry  - Evaluate need for skin moisturizer/barrier cream  - Collaborate with interdisciplinary team (i e  Nutrition, Rehabilitation, etc )   - Patient/family teaching  Outcome: Progressing  Goal: Incision(s), wounds(s) or drain site(s) healing without S/S of infection  Description: INTERVENTIONS  - Assess and document risk factors for skin impairment   - Assess and document dressing, incision, wound bed, drain sites and surrounding tissue  - Consider nutrition services referral as needed  - Oral mucous membranes remain intact  - Provide patient/ family education  Outcome: Progressing  Goal: Oral mucous membranes remain intact  Description: INTERVENTIONS  - Assess oral mucosa and hygiene practices  - Implement preventative oral hygiene regimen  - Implement oral medicated treatments as ordered  - Initiate Nutrition services referral as needed  Outcome: Progressing     Problem: HEMATOLOGIC - ADULT  Goal: Maintains hematologic stability  Description: INTERVENTIONS  - Assess for signs and symptoms of bleeding or hemorrhage  - Monitor labs  - Administer supportive blood products/factors as ordered and appropriate  Outcome: Progressing     Problem: MUSCULOSKELETAL - ADULT  Goal: Maintain or return mobility to safest level of function  Description: INTERVENTIONS:  - Assess patient's ability to carry out ADLs; assess patient's baseline for ADL function and identify physical deficits which impact ability to perform ADLs (bathing, care of mouth/teeth, toileting, grooming, dressing, etc )  - Assess/evaluate cause of self-care deficits   - Assess range of motion  - Assess patient's mobility  - Assess patient's need for assistive devices and provide as appropriate  - Encourage maximum independence but intervene and supervise when necessary  - Involve family in performance of ADLs  - Assess for home care needs following discharge   - Consider OT consult to assist with ADL evaluation and planning for discharge  - Provide patient education as appropriate  Outcome: Progressing     Problem: PAIN - ADULT  Goal: Verbalizes/displays adequate comfort level or baseline comfort level  Description: Interventions:  - Encourage patient to monitor pain and request assistance  - Assess pain using appropriate pain scale  - Administer analgesics based on type and severity of pain and evaluate response  - Implement non-pharmacological measures as appropriate and evaluate response  - Consider cultural and social influences on pain and pain management  - Notify physician/advanced practitioner if interventions unsuccessful or patient reports new pain  Outcome: Progressing     Problem: INFECTION - ADULT  Goal: Absence or prevention of progression during hospitalization  Description: INTERVENTIONS:  - Assess and monitor for signs and symptoms of infection  - Monitor lab/diagnostic results  - Monitor all insertion sites, i e  indwelling lines, tubes, and drains  - Monitor endotracheal if appropriate and nasal secretions for changes in amount and color  - [place] appropriate cooling/warming therapies per order  - Administer medications as ordered  - Instruct and encourage patient and family to use good hand hygiene technique  - Identify and instruct in appropriate isolation precautions for identified infection/condition  Outcome: Progressing     Problem: SAFETY ADULT  Goal: Patient will remain free of falls  Description: INTERVENTIONS:  - Assess patient frequently for physical needs  -  Identify cognitive and physical deficits and behaviors that affect risk of falls    -  [place] fall precautions as indicated by assessment   - Educate patient/family on patient safety including physical limitations  - Instruct patient to call for assistance with activity based on assessment  - Modify environment to reduce risk of injury  - Consider OT/PT consult to assist with strengthening/mobility  Outcome: Progressing  Goal: Maintain or return to baseline ADL function  Description: INTERVENTIONS:  -  Assess patient's ability to carry out ADLs; assess patient's baseline for ADL function and identify physical deficits which impact ability to perform ADLs (bathing, care of mouth/teeth, toileting, grooming, dressing, etc )  - Assess/evaluate cause of self-care deficits   - Assess range of motion  - Assess patient's mobility; develop plan if impaired  - Assess patient's need for assistive devices and provide as appropriate  - Encourage maximum independence but intervene and supervise when necessary  - Involve family in performance of ADLs  - Assess for home care needs following discharge   - Consider OT consult to assist with ADL evaluation and planning for discharge  - Provide patient education as appropriate  Outcome: Progressing  Goal: Maintain or return mobility status to optimal level  Description: INTERVENTIONS:  - Assess patient's baseline mobility status (ambulation, transfers, stairs, etc ) - Identify cognitive and physical deficits and behaviors that affect mobility  - Identify mobility aids required to assist with transfers and/or ambulation (gait belt, sit-to-stand, lift, walker, cane, etc )  - [place] fall precautions as indicated by assessment  - Record patient progress and toleration of activity level on Mobility SBAR; progress patient to next Phase/Stage  - Instruct patient to call for assistance with activity based on assessment  - Consider rehabilitation consult to assist with strengthening/weightbearing, etc   Outcome: Progressing     Problem: DISCHARGE PLANNING  Goal: Discharge to home or other facility with appropriate resources  Description: INTERVENTIONS:  - Identify barriers to discharge w/patient and caregiver  - Arrange for needed discharge resources and transportation as appropriate  - Identify discharge learning needs (meds, wound care, etc )  - Arrange for interpretive services to assist at discharge as needed  - Refer to Case Management Department for coordinating discharge planning if the patient needs post-hospital services based on physician/advanced practitioner order or complex needs related to functional status, cognitive ability, or social support system  Outcome: Progressing     Problem: Nutrition/Hydration-ADULT  Goal: Nutrient/Hydration intake appropriate for improving, restoring or maintaining nutritional needs  Description: Monitor and assess patient's nutrition/hydration status for malnutrition  Collaborate with interdisciplinary team and initiate plan and interventions as ordered  Monitor patient's weight and dietary intake as ordered or per policy  Utilize nutrition screening tool and intervene as necessary  Determine patient's food preferences and provide high-protein, high-caloric foods as appropriate       INTERVENTIONS:  - Monitor oral intake, urinary output, labs, and treatment plans  - Assess nutrition and hydration status and recommend course of action  - Evaluate amount of meals eaten  - Assist patient with eating if necessary   - Allow adequate time for meals  - Recommend/ encourage appropriate diets, oral nutritional supplements, and vitamin/mineral supplements  - Order, calculate, and assess calorie counts as needed  - Recommend, monitor, and adjust tube feedings and TPN/PPN based on assessed needs  - Assess need for intravenous fluids  - Provide specific nutrition/hydration education as appropriate  - Include patient/family/caregiver in decisions related to nutrition  Outcome: Progressing

## 2021-01-28 LAB
BACTERIA BLD CULT: ABNORMAL
GRAM STN SPEC: ABNORMAL

## 2021-01-30 LAB — BACTERIA BLD CULT: NORMAL

## 2021-03-04 DIAGNOSIS — I48.19 OTHER PERSISTENT ATRIAL FIBRILLATION (HCC): ICD-10-CM

## 2021-03-04 DIAGNOSIS — E66.01 MORBID (SEVERE) OBESITY DUE TO EXCESS CALORIES (HCC): ICD-10-CM

## 2021-03-04 DIAGNOSIS — I50.32 CHRONIC DIASTOLIC (CONGESTIVE) HEART FAILURE (HCC): ICD-10-CM

## 2021-03-04 DIAGNOSIS — I10 ESSENTIAL (PRIMARY) HYPERTENSION: ICD-10-CM

## 2021-03-04 DIAGNOSIS — R06.00 DYSPNEA, UNSPECIFIED: ICD-10-CM

## 2021-03-16 ENCOUNTER — HOSPITAL ENCOUNTER (OUTPATIENT)
Dept: NUCLEAR MEDICINE | Facility: HOSPITAL | Age: 66
Discharge: HOME | End: 2021-03-16
Attending: INTERNAL MEDICINE

## 2021-03-16 DIAGNOSIS — I48.19 OTHER PERSISTENT ATRIAL FIBRILLATION (HCC): ICD-10-CM

## 2021-03-16 DIAGNOSIS — R06.00 DYSPNEA, UNSPECIFIED: ICD-10-CM

## 2021-03-16 DIAGNOSIS — E66.01 MORBID (SEVERE) OBESITY DUE TO EXCESS CALORIES (HCC): ICD-10-CM

## 2021-03-16 DIAGNOSIS — I50.32 CHRONIC DIASTOLIC (CONGESTIVE) HEART FAILURE (HCC): ICD-10-CM

## 2021-04-06 ENCOUNTER — TELEPHONE (OUTPATIENT)
Dept: NON INVASIVE DIAGNOSTICS | Facility: HOSPITAL | Age: 66
End: 2021-04-06

## 2021-04-06 ENCOUNTER — ANESTHESIA EVENT (OUTPATIENT)
Dept: NON INVASIVE DIAGNOSTICS | Facility: HOSPITAL | Age: 66
End: 2021-04-06

## 2021-04-06 NOTE — TELEPHONE ENCOUNTER
Spoke with Patient  Reviewed the following instructions with her:  1)  NPO after midnight but can take AM meds with sip of water  Asked her not to take her diuretic in AM  Patient takes coumadin in afternoon, so instructed her to take coumadin as usual today and then tomorrow after procedure is completed  2)  Explained that patient will need  because she can not drive after having anesthesia  3)  Asked her to bring insurance card, photo ID and list of medicines with her   4)  Instructed patient to arrive at the hospital for 7am tomorrow  Patient stated verbal understanding of these instructions

## 2021-04-06 NOTE — ANESTHESIA PREPROCEDURE EVALUATION
Procedure:  CARDIOVERSION (NON INVASIVE ONLY)    Relevant Problems   CARDIO   (+) Atrial fibrillation with rapid ventricular response (HCC)   (+) Chronic diastolic congestive heart failure (HCC)   (+) Essential hypertension      ENDO   (+) Type 2 diabetes mellitus with hyperglycemia (HCC)      PULMONARY   (+) Pneumonia due to COVID-19 virus      Other   (+) Inguinal lymphadenopathy   (+) Osteomyelitis of right foot (HCC)      Morbid Obesity    Physical Exam    Airway    Mallampati score: II  TM Distance: >3 FB  Neck ROM: full     Dental       Cardiovascular      Pulmonary      Other Findings        Anesthesia Plan  ASA Score- 3     Anesthesia Type- IV sedation with anesthesia with ASA Monitors  Additional Monitors:   Airway Plan:           Plan Factors-    Chart reviewed  Induction- intravenous  Postoperative Plan-     Informed Consent- Anesthetic plan and risks discussed with patient  I personally reviewed this patient with the CRNA  Discussed and agreed on the Anesthesia Plan with the CRNA Gerhardt Sep

## 2021-04-07 ENCOUNTER — HOSPITAL ENCOUNTER (OUTPATIENT)
Dept: NON INVASIVE DIAGNOSTICS | Facility: HOSPITAL | Age: 66
Discharge: HOME | End: 2021-04-07
Attending: INTERNAL MEDICINE
Payer: MEDICARE

## 2021-04-07 ENCOUNTER — DOCUMENTATION (OUTPATIENT)
Dept: OTHER | Facility: HOSPITAL | Age: 66
End: 2021-04-07

## 2021-04-07 ENCOUNTER — ANESTHESIA (OUTPATIENT)
Dept: NON INVASIVE DIAGNOSTICS | Facility: HOSPITAL | Age: 66
End: 2021-04-07

## 2021-04-07 VITALS
DIASTOLIC BLOOD PRESSURE: 77 MMHG | HEART RATE: 63 BPM | TEMPERATURE: 97.8 F | SYSTOLIC BLOOD PRESSURE: 171 MMHG | OXYGEN SATURATION: 100 % | WEIGHT: 280 LBS | HEIGHT: 67 IN | BODY MASS INDEX: 43.95 KG/M2 | RESPIRATION RATE: 18 BRPM

## 2021-04-07 DIAGNOSIS — I48.91 ATRIAL FIBRILLATION, UNSPECIFIED TYPE (HCC): ICD-10-CM

## 2021-04-07 LAB
GLUCOSE SERPL-MCNC: 212 MG/DL (ref 65–140)
GLUCOSE SERPL-MCNC: 217 MG/DL (ref 65–140)

## 2021-04-07 PROCEDURE — 92960 CARDIOVERSION ELECTRIC EXT: CPT

## 2021-04-07 PROCEDURE — 82948 REAGENT STRIP/BLOOD GLUCOSE: CPT

## 2021-04-07 PROCEDURE — 93005 ELECTROCARDIOGRAM TRACING: CPT

## 2021-04-07 RX ORDER — ONDANSETRON 2 MG/ML
4 INJECTION INTRAMUSCULAR; INTRAVENOUS ONCE AS NEEDED
Status: DISCONTINUED | OUTPATIENT
Start: 2021-04-07 | End: 2021-04-11 | Stop reason: HOSPADM

## 2021-04-07 RX ORDER — PROPOFOL 10 MG/ML
INJECTION, EMULSION INTRAVENOUS AS NEEDED
Status: DISCONTINUED | OUTPATIENT
Start: 2021-04-07 | End: 2021-04-07

## 2021-04-07 RX ORDER — LIDOCAINE HYDROCHLORIDE 10 MG/ML
INJECTION, SOLUTION EPIDURAL; INFILTRATION; INTRACAUDAL; PERINEURAL AS NEEDED
Status: DISCONTINUED | OUTPATIENT
Start: 2021-04-07 | End: 2021-04-07

## 2021-04-07 RX ORDER — SODIUM CHLORIDE, SODIUM LACTATE, POTASSIUM CHLORIDE, CALCIUM CHLORIDE 600; 310; 30; 20 MG/100ML; MG/100ML; MG/100ML; MG/100ML
INJECTION, SOLUTION INTRAVENOUS CONTINUOUS PRN
Status: DISCONTINUED | OUTPATIENT
Start: 2021-04-07 | End: 2021-04-07

## 2021-04-07 RX ORDER — MONTELUKAST SODIUM 10 MG/1
10 TABLET ORAL DAILY
COMMUNITY
Start: 2021-03-18

## 2021-04-07 RX ORDER — WARFARIN SODIUM 5 MG/1
TABLET ORAL
COMMUNITY
Start: 2021-03-23

## 2021-04-07 RX ADMIN — LIDOCAINE HYDROCHLORIDE 50 MG: 10 INJECTION, SOLUTION EPIDURAL; INFILTRATION; INTRACAUDAL; PERINEURAL at 08:34

## 2021-04-07 RX ADMIN — PROPOFOL 50 MG: 10 INJECTION, EMULSION INTRAVENOUS at 08:34

## 2021-04-07 RX ADMIN — SODIUM CHLORIDE, SODIUM LACTATE, POTASSIUM CHLORIDE, AND CALCIUM CHLORIDE: .6; .31; .03; .02 INJECTION, SOLUTION INTRAVENOUS at 08:29

## 2021-04-07 RX ADMIN — PROPOFOL 30 MG: 10 INJECTION, EMULSION INTRAVENOUS at 08:38

## 2021-04-07 NOTE — PROCEDURES
Electrical Cardioversion    Date/Time: 4/7/2021 8:25 AM  Performed by: Felicia Galvan DO  Authorized by: Felicia Galvan, DO     Verbal consent obtained?: Yes    Written consent obtained?: Yes    Risks and benefits: Risks, benefits and alternatives were discussed    Consent given by:  Patient  Patient states understanding of procedure being performed: Yes    Patient's understanding of procedure matches consent: Yes    Procedure consent matches procedure scheduled: Yes    Relevant documents present and verified: Yes    Test results available and properly labeled: Yes    Site marked: Yes    Radiology Images displayed and confirmed  If images not available, report reviewed: Yes    Patient sedated: Yes    Sedation:  Propofol  Vital signs: Vital signs monitored during sedation    Cardioversion basis:  Elective  Pre-procedure rhythm:  Atrial fibrillation  Electrodes:  Pads  Electrodes placed:  Anterior-posterior  Number of attempts:  1  Attempt 1:     Attempt 1 waveform:  Biphasic    Attempt 1 shock (Joules):  200   Patient tolerated the procedure with no post procedure complications    The patient converted from atrial fibrillation to normal sinus rhythm

## 2021-04-07 NOTE — PROGRESS NOTES
Clayton Keene is a 72year old female who is seen for follow-up of heart failure and atrial fibrillation  She was last seen by Dr Jai Ta on 12/1/2020 as a new patient evaluation for HFpEF  PMHx of hypertension, diabetes mellitus type II, osteomyelitis of right foot s/p amputation of 5th metatarsal in Feb 2019, morbid obesity (BMI 45), osteoarthritis of both knees (gets injections), lumbar disc disease  Admitted to [address] [place] [address] 1/2021 with COVID-19, had AF with RVR and acute HFpEF  D/C to home in AF on Lopressor and Eliquis  Troponin was mildly elevated but flat  Here today for follow up    HPI:  Since d/c from [address] [place] [address]: Feeling tired, shaky, QUINONEZ/SOB with wearing a mask  No chest pain  Can feel her AF on occasion  Feels worse since coming home from the hospital    Eliquis was too expensive and is now on Coumadin - started Coumadin last week  Lopressor was increased to 100 mg last week to due elevated HR  Wt leaving [facility] was 275 lbs and today was 285 lbs  Was 260 lbs before COVID - gaining wt because she is eating too much  Takes Lasix 40 mg daily and doesn't take any extra; started taking Lasix back in November  No sig LE edema     Never been tested for YINA - no reports of snoring    Past Medical History:   Diagnosis Date    Diabetes mellitus [facility] )    Hypertension    Morbid obesity ([facility] )     Patient Active Problem List   Diagnosis Code    Chronic pain syndrome G89 4    Anxiety F41 9    Osteoarthritis of spine with radiculopathy, lumbar region M47 26    Lymph node enlargement R59 9    Type 2 diabetes mellitus with hemoglobin A1c goal of less than 7 0% ([facility]) E11 9    HTN, goal below 130/80 I10    Acquired absence of other toe(s), unspecified side ([facility] ) Z89 429    Type 2 diabetes mellitus with other specified complication ([facility]) N97 31    Congestive heart failure, NYHA class 1 ([facility]) I50 9    Body mass index (BMI) of 45 0 to 49 9 in adult [facility]) Z68 42    Persistent atrial fibrillation ([facility] ) I48 19     Past Surgical History:   Procedure Laterality Date    AMPUTATION OF TOE   partial    BREAST SURGERY PROCEDURE NEC   lumpectomy    REMOVAL OF APPENDIX    REMOVAL OF OVARY(S)    REMOVE GALLBLADDER     Family History   Problem Relation Age of Onset    No Known Problems Mother    Lymphoma Father 45   Lung, bone and brain mets    Other (Hodgkin lymphoma) Sister    Other (Non-Hodgkin Lymphoma) Brother    Kidney cancer Sister    Heart disease Uncle (Paternal)     Social History     Tobacco Use    Smoking status: Never Smoker    Smokeless tobacco: Never Used   Substance Use Topics    Alcohol use: Yes   Comment: social    Drug use: No     Vaping/E-Cigarette Use     Vaping/E-Cigarette Substances     Vaping/E-Cigarette Devices     Review of patient's allergies indicates: Allergen Reactions    Sulfa Antibiotics Edema airway    Cardizem [Diltiazem] Psych complications    Penicillins Rash     Current Outpatient Medications   Medication Sig Dispense Refill    Warfarin Sodium 5 MG Oral Tablet (Coumadin) Take 1 Tab by mouth every evening  Or as directed by coumadin clinic 30 Tab 1    Metoprolol Tartrate 50 MG Oral Tablet (Lopressor) Take 2 Tabs by mouth 2 times a day  (Patient taking differently: Take 200 mg by mouth 2 times a day ) 120 Tab 1    Montelukast Sodium 10 MG Oral Tablet (Singulair) Take 1 Tab by mouth daily  30 Tab 5    Saline Nasal Spray 0 65 % Nasal Solution (Ocean) Administer 1 Spray into nostril as needed for Congestion  30 mL 12    Cholecalciferol 25 MCG (1000 UT) Oral Tablet Take 2,000 Units by mouth   Furosemide 40 MG Oral Tablet (Lasix) Take 40 mg by mouth daily   Vitamins/Minerals Oral Tablet Take 1 Tab by mouth   Zinc Sulfate 220 (50 Zn) MG Oral Capsule Take 220 mg by mouth   Esomeprazole Magnesium 20 MG Oral Capsule Delayed Release (NexIUM) Take 1 Cap by mouth daily   1 hour before the first meal of the day 30 Cap 5    baclofen (LIORESAL) 10 MG Tablet Take 1-2 Tabs by mouth 3 times a day as needed for Pain or Muscle spasms  100 Tab 2    Insulin NPH Isophane & Regular (NOVOLIN 70/30) 100 (70-30) UNIT/ML injection Inject 15 Units under the skin 2 times a day  15 units twice daily 10 mL 11    Cholecalciferol (VITAMIN D) 2000 units Tablet Take 2,000 Units by mouth daily   insulin REGULAR human (NOVOLIN R) 100 UNIT/ML injection Up to 4 times daily using sliding scale 1 Vial 5     PHYSICAL EXAMINATION  /84  Pulse 79  Temp 35 °C (95 °F)  Resp 22  Ht 1 702 m (5' 7")  Wt 129 4 kg (285 lb 4 8 oz)  SpO2 98%  BMI 44 68 kg/m²  BSA 2 47 m²   Body mass index is 44 68 kg/m²  Constitutional: no acute distress  CV: irregularly irregular pulse  Chest: normal respiratory effort, (+)decreased BS   Abdomen: soft, no tenderness, (+) obese   Extremities: (+) 1-2+ LE edema   Skin: warm, dry, intact:  Neuro: alert, oriented to person, place, and time, gait normal  Psych: normal mood and affect, nonsuicidal, judgement normal, memory normal    Laboratory Data Review:    1/26/21  sCr 0 99  K 3 8    TTE 12/3/2020  Interpretation Summary  The examination is limited quality but adequate for evaluation of the referral indication  The qualitative LV ejection fraction is 50-54% (normal)  Mild to moderate mitral regurgitation is present  The proximal ascending thoracic aorta is mildly enlarged  ECG 1/25/21  Atrial fibrillation with rapid ventricular response  Rightward axis  Low voltage QRS  Abnormal ECG  When compared with ECG of 05-NOV-2017 18:31,  Significant changes have occurred  Confirmed by Rosibel Roberson ([id number]) on 1/25/2021 8:40:59 AM    CTPE 1/24/21  1   No acute pulmonary embolism  2   Multifocal groundglass opacity likely representing Covid-19 pneumonia  3   Rounded nodular focus in the left lower quadrant, unlikely related to inflammation given morphology   Based on current Fleischner Society 2017 Guidelines on incidental pulmonary nodule, followup non-contrast CT is recommended at 6 months from the   initial examination and, if stable at that time, an additional followup is recommended for 18-24 months from the initial examination  4   Moderate mediastinal lymphadenopathy  While this may be reactive, this degree lymphadenopathy is not typical for COVID-19  This can also be assessed at 6 month follow-up for the lung nodule  The study was marked in [facility] for immediate notification  ECG 3/4/21  AF HR 78 bpm  PVCs    Impression and Plan:    1  AF - persistent with CVR on increased dose Lopressor; appears to be minimally symptomatic  - Coumadin per [facility] pharmacy - goal INR 2-3; Eliquis is too expensive  - plan DCCV today therapeutic INR; would try to avoid BARBARA given her respiroatry status and morbid obesity  - recommend YINA evaluation    2  HFpEF - wt up 10 lbs since d/c from [address] [place] [address] - from increased volume and increased food consumption   - increase Lasix to 80 mg daily for the next 4 days  - follow daily wt's    3  MR - mild to moderate    4   Elevated Troponin  - demand ischemia from her AF with RVR  - no h/o CAD; plan pharm MPI to assess her ischemic burden

## 2021-04-07 NOTE — ANESTHESIA POSTPROCEDURE EVALUATION
Post-Op Assessment Note    CV Status:  Stable    Pain management: adequate     Mental Status:  Awake and sleepy   Hydration Status:  Euvolemic   PONV Controlled:  Controlled   Airway Patency:  Patent      Post Op Vitals Reviewed: Yes      Staff: CRNA         No complications documented      BP     Temp      Pulse     Resp      SpO2

## 2021-04-07 NOTE — NURSING NOTE
Patients FSBS recheck was 212    Spoke with anesthesia and patient able to take own insulin and patient gave her own insulin

## 2021-04-12 LAB
ATRIAL RATE: 187 BPM
ATRIAL RATE: 69 BPM
P AXIS: 20 DEGREES
PR INTERVAL: 216 MS
QRS AXIS: 106 DEGREES
QRS AXIS: 109 DEGREES
QRSD INTERVAL: 88 MS
QRSD INTERVAL: 90 MS
QT INTERVAL: 430 MS
QT INTERVAL: 436 MS
QTC INTERVAL: 460 MS
QTC INTERVAL: 467 MS
T WAVE AXIS: 73 DEGREES
T WAVE AXIS: 76 DEGREES
VENTRICULAR RATE: 69 BPM
VENTRICULAR RATE: 69 BPM

## 2021-09-21 ENCOUNTER — TELEPHONE (OUTPATIENT)
Dept: NON INVASIVE DIAGNOSTICS | Facility: HOSPITAL | Age: 66
End: 2021-09-21

## 2021-09-21 NOTE — TELEPHONE ENCOUNTER
Spoke with patient  Covid screening negative  Instructed patient to be NPO after midnight tonight and to make sure she takes her coumadin dose this evening  She is to take her morning meds tomorrow with a sip of water  Also told her to make sure she has a  with her because she may not drive after receiving anesthesia  Patient stated verbal understanding of these instructions

## 2021-09-22 ENCOUNTER — ANESTHESIA EVENT (OUTPATIENT)
Dept: NON INVASIVE DIAGNOSTICS | Facility: HOSPITAL | Age: 66
End: 2021-09-22

## 2021-09-22 ENCOUNTER — ANESTHESIA (OUTPATIENT)
Dept: NON INVASIVE DIAGNOSTICS | Facility: HOSPITAL | Age: 66
End: 2021-09-22

## 2021-09-22 ENCOUNTER — HOSPITAL ENCOUNTER (OUTPATIENT)
Dept: NON INVASIVE DIAGNOSTICS | Facility: HOSPITAL | Age: 66
Discharge: HOME | End: 2021-09-22
Payer: MEDICARE

## 2021-09-22 VITALS
HEART RATE: 58 BPM | RESPIRATION RATE: 18 BRPM | TEMPERATURE: 97.6 F | SYSTOLIC BLOOD PRESSURE: 124 MMHG | OXYGEN SATURATION: 97 % | DIASTOLIC BLOOD PRESSURE: 58 MMHG

## 2021-09-22 DIAGNOSIS — I48.91 ATRIAL FIBRILLATION, UNSPECIFIED TYPE (HCC): ICD-10-CM

## 2021-09-22 LAB
ATRIAL RATE: 312 BPM
ATRIAL RATE: 59 BPM
P AXIS: 80 DEGREES
PR INTERVAL: 208 MS
QRS AXIS: 113 DEGREES
QRS AXIS: 117 DEGREES
QRSD INTERVAL: 94 MS
QRSD INTERVAL: 94 MS
QT INTERVAL: 472 MS
QT INTERVAL: 510 MS
QTC INTERVAL: 447 MS
QTC INTERVAL: 504 MS
T WAVE AXIS: 74 DEGREES
T WAVE AXIS: 77 DEGREES
VENTRICULAR RATE: 54 BPM
VENTRICULAR RATE: 59 BPM

## 2021-09-22 PROCEDURE — 93005 ELECTROCARDIOGRAM TRACING: CPT

## 2021-09-22 PROCEDURE — 82948 REAGENT STRIP/BLOOD GLUCOSE: CPT

## 2021-09-22 PROCEDURE — 92960 CARDIOVERSION ELECTRIC EXT: CPT

## 2021-09-22 RX ORDER — AMIODARONE HYDROCHLORIDE 200 MG/1
200 TABLET ORAL DAILY
COMMUNITY
End: 2022-06-21

## 2021-09-22 RX ORDER — LIDOCAINE HYDROCHLORIDE 20 MG/ML
INJECTION, SOLUTION EPIDURAL; INFILTRATION; INTRACAUDAL; PERINEURAL AS NEEDED
Status: DISCONTINUED | OUTPATIENT
Start: 2021-09-22 | End: 2021-09-22

## 2021-09-22 RX ORDER — SODIUM CHLORIDE, SODIUM LACTATE, POTASSIUM CHLORIDE, CALCIUM CHLORIDE 600; 310; 30; 20 MG/100ML; MG/100ML; MG/100ML; MG/100ML
INJECTION, SOLUTION INTRAVENOUS CONTINUOUS PRN
Status: DISCONTINUED | OUTPATIENT
Start: 2021-09-22 | End: 2021-09-22

## 2021-09-22 RX ORDER — PROPOFOL 10 MG/ML
INJECTION, EMULSION INTRAVENOUS AS NEEDED
Status: DISCONTINUED | OUTPATIENT
Start: 2021-09-22 | End: 2021-09-22

## 2021-09-22 RX ADMIN — PROPOFOL 50 MG: 10 INJECTION, EMULSION INTRAVENOUS at 08:36

## 2021-09-22 RX ADMIN — SODIUM CHLORIDE, SODIUM LACTATE, POTASSIUM CHLORIDE, AND CALCIUM CHLORIDE: .6; .31; .03; .02 INJECTION, SOLUTION INTRAVENOUS at 08:29

## 2021-09-22 RX ADMIN — LIDOCAINE HYDROCHLORIDE 100 MG: 20 INJECTION, SOLUTION EPIDURAL; INFILTRATION; INTRACAUDAL; PERINEURAL at 08:36

## 2021-09-22 RX ADMIN — PROPOFOL 30 MG: 10 INJECTION, EMULSION INTRAVENOUS at 08:37

## 2021-09-22 NOTE — H&P
Addendum:    Since last being seen pt has been therapeutic on coumadin for >3 weeks managed by the [facility] pharmacy clinic  She did start amiodarone  She has bene feeling SOB and having some chest discomfort which she thinks is caused by the amiodarone  She otherwise feels the same  Vitals reviewed today  Vitals:    09/22/21 0748   BP: (!) 196/94   Pulse: 62   Resp: 20   Temp: 97 7 °F (36 5 °C)   SpO2: 98%     BP rechecked 148/65    Pre op labs reviewed  Pt is alert and oriented on exam  Neck supple  No JVD  Heart irregularly irregular  No murmur/g/r  Lungs clear to auscultation bilaterally without w/r/r  Legs without edema  Cap refill <2 seconds  Abdomen soft  Pt agreeable to proceeding with cardioversion on amiodarone today  Consent obtained by Dr Jie Vázquez  Please see his attestation  Alberto Garcia PA-C  9/22/2021        Cardiology Outpatient Follow-up        Suhas Craft is a 77year old female who is seen for follow-up of HFPEF and PAF  HPI:  Admitted [facility] 1/21 with COVID and Rapid AF  Mildly elevated hsTn  HTN  DM II  Osteo  Underwent DCC at [address] [place] [address] in April  Felt great after cardioversion but reported more anxiety  In April had repeat zio that showed 100% afib  We called to offer her an earlier appt to discuss repeat DCCV with AAD but she declined  Was seen a few weeks ago and agreed to procced with DCCV  In the [facility] she saw PCP and reported edema, weight gain despite increasing lasix to 40 mg PO BID  She is now here for an urgent appt  She reports over the last few days her edema has resolved  She has been taking lasix 40 mg twice a day for five days and her symptoms resolved  She has not had her renal function checked since increasing her lasix  She has an INR check on Thursday  She still would like to do her DCCV once therapeutic x 3 weeks      Past Medical History:   Diagnosis Date    Diabetes mellitus ([facility]    Hypertension    Morbid obesity ([facility] )     Patient Active Problem List   Diagnosis Code    Chronic pain syndrome G89 4    Anxiety F41 9    Osteoarthritis of spine with radiculopathy, lumbar region M47 26    Lymph node enlargement R59 9    Type 2 diabetes mellitus with hemoglobin A1c goal of less than 7 0% ([facility]) E11 9    HTN, goal below 130/80 I10    Acquired absence of other toe(s), unspecified side ([facility] ) Z89 429    Type 2 diabetes mellitus with other specified complication ([facility]) Z79 84    Congestive heart failure, NYHA class 1 ([facility]) I50 9    Persistent atrial fibrillation ([facility]) I48 19    Body mass index (BMI) of 40 0 to 44 9 in adult [facility]) Z68 41     Past Surgical History:   Procedure Laterality Date    AMPUTATION OF TOE   partial    BREAST SURGERY PROCEDURE NEC   lumpectomy    REMOVAL OF APPENDIX    REMOVAL OF OVARY(S)    REMOVE GALLBLADDER       Social History     Tobacco Use    Smoking status: Never Smoker    Smokeless tobacco: Never Used   Substance Use Topics    Alcohol use: Yes   Comment: social    Drug use: No     Vaping/E-Cigarette Use    Vaping/E-Cigarette Use Never User     Vaping/E-Cigarette Substances     Vaping/E-Cigarette Devices     Review of patient's allergies indicates: Allergen Reactions    Sulfa Antibiotics Edema airway    Cardizem [Diltiazem] Psych complications    Penicillins Rash     Current Outpatient Medications   Medication Sig Dispense Refill    predniSONE 20 MG Oral Tablet (Deltasone) Take 2 Tabs by mouth daily for 5 days  10 Tab 0    Esomeprazole Magnesium 20 MG Oral Capsule Delayed Release take 1 capsule by mouth once daily 1 HOUR BEFORE THE FIRST MEAL OF THE DAY 30 Cap 5    hydrOXYzine HCl 25 MG Oral Tablet Take 1 Tab by mouth every 6 hours as needed for Anxiety  40 Tab 0    Vitamin C 100 MG Oral Tablet Take 100 mg by mouth daily   CoQ10 200 MG Oral Capsule Take by mouth   Furosemide 40 MG Oral Tablet (Lasix) Take 1 Tab by mouth daily   90 Tab 3    Metoprolol Tartrate 50 MG Oral Tablet (Lopressor) Take 2 Tabs by mouth 2 times a day  120 Tab 5    Warfarin Sodium 5 MG Oral Tablet (Coumadin) Take 1 and 1/2 tabs by mouth on Mondays and 2 tabs all other days of the week Or as directed by coumadin clinic 240 Tab 3    Montelukast Sodium 10 MG Oral Tablet (Singulair) Take 1 Tab by mouth daily  30 Tab 5    Saline Nasal Spray 0 65 % Nasal Solution (Ocean) Administer 1 Spray into nostril as needed for Congestion  30 mL 12    Zinc Sulfate 220 (50 Zn) MG Oral Capsule Take 220 mg by mouth   Insulin NPH Isophane & Regular (NOVOLIN 70/30) 100 (70-30) UNIT/ML injection Inject 15 Units under the skin 2 times a day  15 units twice daily 10 mL 11    Cholecalciferol (VITAMIN D) 2000 units Tablet Take 2,000 Units by mouth daily   insulin REGULAR human (NOVOLIN R) 100 UNIT/ML injection Up to 4 times daily using sliding scale 1 Vial 5     ROS:  Neg x HPI    PHYSICAL EXAMINATION  Ht 1 702 m (5' 7")  BMI 45 78 kg/m²  BSA 2 5 m²   Body mass index is 45 78 kg/m²  Constitutional: no acute distress  CV: Irregular, no S3   Chest: normal respiratory effort, lungs clear to auscultation and percussion, breath sounds normal  Abdomen: normal: soft, bowel sounds normal, no masses, tenderness or organomegaly  Musculoskeletal: (-) negative  Extremities: no clubbing, cyanosis, or edema, otherwise grossly normal, warm, and dry  Neuro: alert, oriented to person, place, and time    Laboratory Data Review:  Arley [employer] tracing: AF, rate controlled    zio 4/26/21:  Preliminary Findings  1 run of Ventricular Tachycardia occurred lasting 4 beats with a max rate  of 126 bpm (avg 114 bpm)  Atrial Fibrillation occurred continuously (100%  burden), ranging from  bpm (avg of 70 bpm)  Idioventricular  Rhythm was present  Isolated VEs were rare (<1 0%, 5800), VE Couplets  were rare (<1 0%, 62), and VE Triplets were rare (<1 0%, 2)  Ventricular  Bigeminy and Trigeminy were present  Impression:  1  AF, recurrent after [address]  2  Recent covid infection  3  Normal LVEF  4  HFPEF- reports was hypervolemic but improved with higher dose of lasix  5  High BMI  6  ? YINA    Plan:  1  Need to be therapeutic x 3 weeks in order to proceed with DCCV  2  Will discuss with Dr Freada Paget which antiarrhythmic we will use in her case  We need to review her echo to eval LV wall thickness   3  Discussed sending patient for sleep apnea testing  She is now agreeable  4  Continue warfarin  5  Will recheck renal function and electrolytes with higher dose of lasix, if normal plan to continue present dose    Fu after cardioversion  Pre op labs ordered but not done until 3 days prior to cardioversion       Cornell Hernandez [place]  Cardiology Shyanne Calvin  [address], [address] [facility]  Phone: [phone]  Fax: [phone]  7/20/2021

## 2021-09-22 NOTE — ANESTHESIA POSTPROCEDURE EVALUATION
Post-Op Assessment Note    CV Status:  Stable  Pain Score: 0    Pain management: adequate     Mental Status:  Sleepy   Hydration Status:  Euvolemic   PONV Controlled:  Controlled   Airway Patency:  Patent   Two or more mitigation strategies used for obstructive sleep apnea   Post Op Vitals Reviewed: Yes      Staff: CRNA         No complications documented      BP   140/64   Temp   98 1   Pulse  52   Resp   17   SpO2   98

## 2021-09-23 LAB
GLUCOSE SERPL-MCNC: 230 MG/DL (ref 65–140)
GLUCOSE SERPL-MCNC: 235 MG/DL (ref 65–140)

## 2021-12-09 ENCOUNTER — HOSPITAL ENCOUNTER (OUTPATIENT)
Dept: NON INVASIVE DIAGNOSTICS | Facility: HOSPITAL | Age: 66
Discharge: HOME | End: 2021-12-09
Payer: MEDICARE

## 2021-12-09 VITALS
HEART RATE: 60 BPM | SYSTOLIC BLOOD PRESSURE: 132 MMHG | HEIGHT: 67 IN | DIASTOLIC BLOOD PRESSURE: 84 MMHG | BODY MASS INDEX: 43.95 KG/M2 | WEIGHT: 280 LBS

## 2021-12-09 DIAGNOSIS — I50.32 CHRONIC DIASTOLIC (CONGESTIVE) HEART FAILURE (HCC): ICD-10-CM

## 2021-12-09 DIAGNOSIS — I48.19 OTHER PERSISTENT ATRIAL FIBRILLATION (HCC): ICD-10-CM

## 2021-12-09 DIAGNOSIS — I10 ESSENTIAL (PRIMARY) HYPERTENSION: ICD-10-CM

## 2021-12-09 DIAGNOSIS — E66.01 MORBID (SEVERE) OBESITY DUE TO EXCESS CALORIES (HCC): ICD-10-CM

## 2021-12-09 LAB
AORTIC ROOT: 3.5 CM
APICAL FOUR CHAMBER EJECTION FRACTION: 62 %
FRACTIONAL SHORTENING: 40 % (ref 28–44)
INTERVENTRICULAR SEPTUM IN DIASTOLE (PARASTERNAL SHORT AXIS VIEW): 1.3 CM
LEFT INTERNAL DIMENSION IN SYSTOLE: 3.3 CM (ref 2.1–4)
LEFT VENTRICLE DIASTOLIC VOLUME (MOD BIPLANE): 61 ML
LEFT VENTRICLE SYSTOLIC VOLUME (MOD BIPLANE): 24 ML
LEFT VENTRICULAR INTERNAL DIMENSION IN DIASTOLE: 5.5 CM (ref 12.03–17.93)
LEFT VENTRICULAR POSTERIOR WALL IN END DIASTOLE: 1.2 CM
LEFT VENTRICULAR STROKE VOLUME: 103 ML
LV EF: 61 %
MV E'TISSUE VEL-LAT: 14 CM/S
MV E'TISSUE VEL-SEP: 9 CM/S
MV PEAK A VEL: 1.2 M/S
MV PEAK E VEL: 120 CM/S
SL CV LV EF: 60
SL CV PED ECHO LEFT VENTRICLE DIASTOLIC VOLUME (MOD BIPLANE) 2D: 147 ML
SL CV PED ECHO LEFT VENTRICLE SYSTOLIC VOLUME (MOD BIPLANE) 2D: 45 ML
TRICUSPID VALVE S': 1.2 CM/S
Z-SCORE OF LEFT VENTRICULAR DIMENSION IN END SYSTOLE: -9.82

## 2021-12-09 PROCEDURE — 93321 DOPPLER ECHO F-UP/LMTD STD: CPT | Performed by: INTERNAL MEDICINE

## 2021-12-09 PROCEDURE — 93308 TTE F-UP OR LMTD: CPT

## 2021-12-09 PROCEDURE — 93325 DOPPLER ECHO COLOR FLOW MAPG: CPT | Performed by: INTERNAL MEDICINE

## 2021-12-09 PROCEDURE — 93308 TTE F-UP OR LMTD: CPT | Performed by: INTERNAL MEDICINE

## 2022-01-24 NOTE — ASSESSMENT & PLAN NOTE
Abnormal CT chest, patient without hypoxia    Patient does not require oxygen, start treatment via mild protocol      CT findings as outlined below will require repeat imaging of the chest in 6 months   Moderate mediastinal lymphadenopathy  While this may be reactive, this degree lymphadenopathy is not typical for COVID-19  This can also be assessed at 6 month follow-up for the lung nodule  There is a rounded nodular focus in the left lower lobe measuring 6 mm on series 3, image 68  There is no tracheal or endobronchial lesion 
Abnormal CT chest, patient without hypoxia    Patient does not require oxygen, start treatment via mild protocol      CT findings as outlined below will require repeat imaging of the chest in 6 months   Moderate mediastinal lymphadenopathy  While this may be reactive, this degree lymphadenopathy is not typical for COVID-19  This can also be assessed at 6 month follow-up for the lung nodule  There is a rounded nodular focus in the left lower lobe measuring 6 mm on series 3, image 68  There is no tracheal or endobronchial lesion 
Chads Vasc score of 5  Cardiology input appreciated 2D echo today  Started on Cardizem infusion on admission, will attempt to titrate off  Started on metoprolol 25 mg P q 8
History of hypertension on Lasix and lisinopril, will hold both    Can likely restart lisinopril tomorrow
Initiated on Cardizem drip for rate control, will maintain Cardizem drip overnight, can likely transition to oral regimen tomorrow    Patient appears to be mildly volume depleted, will give 1 L of normal saline at 75 mL/hour overnight    Start p o  Eliquis for anticoagulation, CHADS2 Vasc score of 5
Lab Results   Component Value Date    HGBA1C 12 0 (H) 11/06/2017       No results for input(s): POCGLU in the last 72 hours      Blood Sugar Average: Last 72 hrs:   resume home regimen, patient will have hemoglobin A1c checked
Likely non MI troponin elevation the setting AFib with RVR
Likely non MI troponin elevation the setting AFib with RVR
Mild, give fluid and follow up a m   Labs, patient appears hypovolemic
Resolved
Wt Readings from Last 3 Encounters:   01/24/21 124 kg (274 lb 4 oz)   02/15/19 118 kg (260 lb)   11/09/17 118 kg (259 lb 7 7 oz)       EF reported to be 50% in the [facility] everywhere    Kelsie Stewart MD - 12/03/2020 5:35 PM EST   echocardiogram which shows a preserved LVEF and mild to moderate mitral regurgitation  Prox ascending aorta is mildly dilated at 3 8 cm  Feeling better with PO lasix 40 mg daily   Advised to maintain a weight log
Wt Readings from Last 3 Encounters:   01/25/21 125 kg (275 lb 12 7 oz)   02/15/19 118 kg (260 lb)   11/09/17 118 kg (259 lb 7 7 oz)       EF reported to be 50% in the [facility] with mitral regurg  Continue Lasix 40 mg daily
no

## 2022-06-21 ENCOUNTER — OFFICE VISIT (OUTPATIENT)
Dept: URGENT CARE | Facility: CLINIC | Age: 67
End: 2022-06-21
Payer: MEDICARE

## 2022-06-21 VITALS
RESPIRATION RATE: 19 BRPM | HEIGHT: 68 IN | WEIGHT: 264 LBS | BODY MASS INDEX: 40.01 KG/M2 | OXYGEN SATURATION: 93 % | SYSTOLIC BLOOD PRESSURE: 138 MMHG | TEMPERATURE: 98 F | DIASTOLIC BLOOD PRESSURE: 80 MMHG | HEART RATE: 79 BPM

## 2022-06-21 DIAGNOSIS — R05.9 COUGH: Primary | ICD-10-CM

## 2022-06-21 PROCEDURE — G0463 HOSPITAL OUTPT CLINIC VISIT: HCPCS

## 2022-06-21 PROCEDURE — 99203 OFFICE O/P NEW LOW 30 MIN: CPT

## 2022-06-21 RX ORDER — UBIDECARENONE 200 MG
1 CAPSULE ORAL DAILY
COMMUNITY

## 2022-06-21 RX ORDER — BENZONATATE 200 MG/1
200 CAPSULE ORAL 3 TIMES DAILY PRN
Qty: 20 CAPSULE | Refills: 0 | Status: SHIPPED | OUTPATIENT
Start: 2022-06-21

## 2022-06-21 NOTE — PROGRESS NOTES
[facility]        NAME: David Lynn is a 79 y o  female  : 1955    MRN: [id number]  DATE: 2022  TIME: 11:12 AM    Assessment and Plan   Cough [R05 9]  1  Cough  benzonatate (TESSALON) 200 MG capsule     Will trial tessalon for the cough  Did advise non-pharmaceutical treatments  No abx indicated at this time  Patient Instructions   Take tessalon pearls up to three times a day as needed for coughing  Push fluids  Add humidifier at bedside, continue with nasal saline or flonase  Pt appears to have a viral upper respiratory infection and no antibiotic is indicated at this time  Although the symptoms are troublesome, usually the patient's body is able to recover from a viral infection on an average time of 7-10 days  Fever, if any, typically resolves after 3-5 days  If patient has sore throat, typically this resolves within 3-5 days  Any nasal congestion, runny nose, post nasal drip typically begin to  improve after 7-10 days  Any cough may linger over a couple weeks  Please note that having a cough is not necessarily a bad thing  It often times is part of our body's protective mechanism to help keep our airways clear  Please note that yellow mucous doesn't necessarily mean a "bacterial" infection  Yellow mucous doesn't automatically mean that an antibiotic is needed  It is not unusual for mucus to become more discolored in the days after the start of an upper respiratory infection  Often times this is due to mucous that has thickened  with white blood cells that have flooded the mucosa to try and fight the viral infection  Only children 5 and above can have over the counter cough/ cold medications  Natural remedies to help provide comfort for cough/ cold symptoms include: one teaspoon of honey (only in infants over 1 year of age), increased vitamin C (oranges, carrie, etc ), ginger, and drinking plenty of fluids  Vaporizer by bedside       If your child should have prolonged symptoms, worsening symptoms, or any new symptoms please seek further medical attention  If your child would be having difficulty breathing, seek further evaluation by calling 911 or proceeding to ER for further evaluation  Follow up with PCP in 3-5 days  Proceed to  ER if symptoms worsen  Chief Complaint     Chief Complaint   Patient presents with    Cough         History of Present Illness       Patient is a 79year old female with a hx of afib on warfarin who presents to the office today for a cough  States cough started on Friday is non-productive  States last INR check was on 06/09/2022 and was 2 7  She denies any chest pain, shortness a breath, nausea, vomiting, diarrhea  She does not smoke  She states that her daughter has been sick at home with a sinus infection and bilateral ear infection as well  She did not check her blood sugar today over yesterday was 158  At home she has been trying Mucinex [place] and nasal saline with minimal help  She currently sleeps sitting up in a recliner and does not have a humidifier at bedside  She states that when she blows her nose there is blood in the tissue  Review of Systems   Review of Systems   Constitutional: Negative for chills and fever  HENT: Positive for congestion and postnasal drip  Negative for ear pain, rhinorrhea, sinus pressure, sinus pain and sore throat  Respiratory: Positive for cough  Negative for shortness of breath and wheezing  Cardiovascular: Negative for chest pain and palpitations  Gastrointestinal: Negative for diarrhea, nausea and vomiting  Skin: Negative for rash  All other systems reviewed and are negative          Current Medications       Current Outpatient Medications:     acetaminophen (TYLENOL) 325 mg tablet, Take 2 tablets by mouth every 6 (six) hours as needed for mild pain, headaches or fever, Disp: 30 tablet, Rfl: 0    benzonatate (TESSALON) 200 MG capsule, Take 1 capsule (200 mg total) by mouth 3 (three) times a day as needed for cough, Disp: 20 capsule, Rfl: 0    cholecalciferol (VITAMIN D3) 1,000 units tablet, Take 2,000 Units by mouth daily, Disp: , Rfl:     cyanocobalamin (VITAMIN B-12) 500 MCG tablet, Take 2 tablets by mouth daily, Disp: , Rfl:     furosemide (LASIX) 40 mg tablet, Take 40 mg by mouth, Disp: , Rfl:     insulin NPH-insulin regular (NOVOLIN 70/30 RELION) 100 units/mL subcutaneous injection, Inject 15 Units under the skin 2 (two) times a day before meals, Disp: 10 mL, Rfl: 0    insulin regular (HumuLIN R,NovoLIN R) 100 units/mL injection, once Sliding scale, Disp: , Rfl:     metoprolol tartrate (LOPRESSOR) 25 mg tablet, Take 2 tablets (50 mg total) by mouth every 12 (twelve) hours, Disp: 60 tablet, Rfl: 0    montelukast (SINGULAIR) 10 mg tablet, Take 10 mg by mouth daily, Disp: , Rfl:     warfarin (COUMADIN) 5 mg tablet, Take 1 and 1/2 tabs by mouth on Mondays and 2 tabs all other days of the week Or as directed by coumadin clinic, Disp: , Rfl:     amiodarone 200 mg tablet, Take 200 mg by mouth daily, Disp: , Rfl:     apixaban (ELIQUIS) 5 mg, Take 1 tablet (5 mg total) by mouth 2 (two) times a day, Disp: 60 tablet, Rfl: 0    Ascorbic Acid, Vitamin C, (VITAMIN C) 100 MG tablet, Take 100 mg by mouth daily, Disp: , Rfl:     Coenzyme Q10 200 MG capsule, Take 1 capsule by mouth daily, Disp: , Rfl:     Insulin Syringe-Needle U-100 31G X 15/64" 1 ML MISC, Inject under the skin 2 (two) times a day before meals RELION BRAND, Disp: 100 each, Rfl: 0    multivitamin-minerals (CENTRUM ADULTS) tablet, Take 1 tablet by mouth daily, Disp: 7 tablet, Rfl: 0    zinc sulfate (ZINCATE) 220 mg capsule, Take 1 capsule (220 mg total) by mouth daily for 5 doses, Disp: 5 capsule, Rfl: 0    Current Allergies     Allergies as of 06/21/2022 - Reviewed 06/21/2022   Allergen Reaction Noted    Penicillins Swelling 01/24/2021    Sulfa antibiotics Swelling 11/05/2017  Cardizem [diltiazem] Anxiety 04/07/2021            The following portions of the patient's history were reviewed and updated as appropriate: allergies, current medications, past family history, past medical history, past social history, past surgical history and problem list      Past Medical History:   Diagnosis Date    [facility]fib [facility])     [facility] [address] [facility]     Diabetes mellitus ([facility] )     Hypertension        Past Surgical History:   Procedure Laterality Date    APPENDECTOMY      BREAST LUMPECTOMY Left     CHOLECYSTECTOMY      TOE AMPUTATION Right 11/8/2017    Procedure: **Distal phalangectomy, NOT amputation*** RIGHT THIRD TOE;  Surgeon: Luis Leazma DPM;  Location: [facility];  Service: Podiatry    TOE OSTEOTOMY Right 2/15/2019    Procedure: 5th METATARSAL HEAD RESECTION;  Surgeon: Nayan Moctezuma DPM;  Location: [facility];  Service: Podiatry       History reviewed  No pertinent family history  Medications have been verified  Objective   /80   Pulse 79   Temp 98 °F (36 7 °C)   Resp 19   Ht 5' 8" (1 727 m)   Wt 120 kg (264 lb)   SpO2 93%   BMI 40 14 kg/m²        Physical Exam     Physical Exam  Vitals and nursing note reviewed  Constitutional:       General: She is not in acute distress  Appearance: Normal appearance  She is not ill-appearing or toxic-appearing  HENT:      Right Ear: There is impacted cerumen  Left Ear: There is impacted cerumen  Nose: Congestion present  Mouth/Throat:      Mouth: Mucous membranes are moist       Comments: Post nasal drip noted  Cardiovascular:      Rate and Rhythm: Normal rate  Rhythm irregular  Pulses: Normal pulses  Heart sounds: Normal heart sounds  No murmur heard  No friction rub  No gallop  Pulmonary:      Effort: Pulmonary effort is normal  No respiratory distress  Breath sounds: Normal breath sounds  No stridor  No wheezing, rhonchi or rales     Musculoskeletal: Cervical back: No tenderness  Lymphadenopathy:      Cervical: No cervical adenopathy  Skin:     General: Skin is warm and dry  Capillary Refill: Capillary refill takes less than 2 seconds  Neurological:      General: No focal deficit present  Mental Status: She is alert and oriented to person, place, and time  Psychiatric:         Mood and Affect: Mood normal          Behavior: Behavior normal          Thought Content:  Thought content normal          Judgment: Judgment normal

## 2022-06-21 NOTE — PATIENT INSTRUCTIONS
Take tessalon pearls up to three times a day as needed for coughing  Push fluids  Add humidifier at bedside, continue with nasal saline or flonase  Pt appears to have a viral upper respiratory infection and no antibiotic is indicated at this time  Although the symptoms are troublesome, usually the patient's body is able to recover from a viral infection on an average time of 7-10 days  Fever, if any, typically resolves after 3-5 days  If patient has sore throat, typically this resolves within 3-5 days  Any nasal congestion, runny nose, post nasal drip typically begin to  improve after 7-10 days  Any cough may linger over a couple weeks  Please note that having a cough is not necessarily a bad thing  It often times is part of our body's protective mechanism to help keep our airways clear  Please note that yellow mucous doesn't necessarily mean a "bacterial" infection  Yellow mucous doesn't automatically mean that an antibiotic is needed  It is not unusual for mucus to become more discolored in the days after the start of an upper respiratory infection  Often times this is due to mucous that has thickened  with white blood cells that have flooded the mucosa to try and fight the viral infection  Only children 5 and above can have over the counter cough/ cold medications  Natural remedies to help provide comfort for cough/ cold symptoms include: one teaspoon of honey (only in infants over 1 year of age), increased vitamin C (oranges, carrie, etc ), ginger, and drinking plenty of fluids  Vaporizer by bedside  If your child should have prolonged symptoms, worsening symptoms, or any new symptoms please seek further medical attention  If your child would be having difficulty breathing, seek further evaluation by calling 911 or proceeding to ER for further evaluation

## 2022-06-21 NOTE — PROGRESS NOTES
[facility]        NAME: Ferdinand Zhao is a 79 y o  female  : 1955    MRN: [id number]  DATE: 2022  TIME: 11:12 AM    Assessment and Plan   Cough [R05 9]  1  Cough  benzonatate (TESSALON) 200 MG capsule     Will trial tessalon for the cough  Did advise non-pharmaceutical treatments  No abx indicated at this time  Patient Instructions   Take tessalon pearls up to three times a day as needed for coughing  Push fluids  Add humidifier at bedside, continue with nasal saline or flonase  Pt appears to have a viral upper respiratory infection and no antibiotic is indicated at this time  Although the symptoms are troublesome, usually the patient's body is able to recover from a viral infection on an average time of 7-10 days  Fever, if any, typically resolves after 3-5 days  If patient has sore throat, typically this resolves within 3-5 days  Any nasal congestion, runny nose, post nasal drip typically begin to  improve after 7-10 days  Any cough may linger over a couple weeks  Please note that having a cough is not necessarily a bad thing  It often times is part of our body's protective mechanism to help keep our airways clear  Please note that yellow mucous doesn't necessarily mean a "bacterial" infection  Yellow mucous doesn't automatically mean that an antibiotic is needed  It is not unusual for mucus to become more discolored in the days after the start of an upper respiratory infection  Often times this is due to mucous that has thickened  with white blood cells that have flooded the mucosa to try and fight the viral infection  Only children 5 and above can have over the counter cough/ cold medications  Natural remedies to help provide comfort for cough/ cold symptoms include: one teaspoon of honey (only in infants over 1 year of age), increased vitamin C (oranges, carrie, etc ), ginger, and drinking plenty of fluids  Vaporizer by bedside       If your child should have prolonged symptoms, worsening symptoms, or any new symptoms please seek further medical attention  If your child would be having difficulty breathing, seek further evaluation by calling 911 or proceeding to ER for further evaluation  Follow up with PCP in 3-5 days  Proceed to  ER if symptoms worsen  Chief Complaint     Chief Complaint   Patient presents with    Cough         History of Present Illness       Patient is a 79year old female with a hx of afib on warfarin who presents to the office today for a cough  States cough started on Friday is non-productive  States last INR check was on 06/09/2022 and was 2 7  She denies any chest pain, shortness a breath, nausea, vomiting, diarrhea  She does not smoke  She states that her daughter has been sick at home with a sinus infection and bilateral ear infection as well  She did not check her blood sugar today over yesterday was 158  At home she has been trying Mucinex Nancy-[place] and nasal saline with minimal help  She currently sleeps sitting up in a recliner and does not have a humidifier at bedside  She states that when she blows her nose there is blood in the tissue  Review of Systems   Review of Systems   Constitutional: Negative for chills and fever  HENT: Positive for congestion and postnasal drip  Negative for ear pain, rhinorrhea, sinus pressure, sinus pain and sore throat  Respiratory: Positive for cough  Negative for shortness of breath and wheezing  Cardiovascular: Negative for chest pain and palpitations  Gastrointestinal: Negative for diarrhea, nausea and vomiting  Skin: Negative for rash  All other systems reviewed and are negative          Current Medications       Current Outpatient Medications:     acetaminophen (TYLENOL) 325 mg tablet, Take 2 tablets by mouth every 6 (six) hours as needed for mild pain, headaches or fever, Disp: 30 tablet, Rfl: 0    benzonatate (TESSALON) 200 MG capsule, Take 1 capsule (200 mg total) by mouth 3 (three) times a day as needed for cough, Disp: 20 capsule, Rfl: 0    cholecalciferol (VITAMIN D3) 1,000 units tablet, Take 2,000 Units by mouth daily, Disp: , Rfl:     cyanocobalamin (VITAMIN B-12) 500 MCG tablet, Take 2 tablets by mouth daily, Disp: , Rfl:     furosemide (LASIX) 40 mg tablet, Take 40 mg by mouth, Disp: , Rfl:     insulin NPH-insulin regular (NOVOLIN 70/30 RELION) 100 units/mL subcutaneous injection, Inject 15 Units under the skin 2 (two) times a day before meals, Disp: 10 mL, Rfl: 0    insulin regular (HumuLIN R,NovoLIN R) 100 units/mL injection, once Sliding scale, Disp: , Rfl:     metoprolol tartrate (LOPRESSOR) 25 mg tablet, Take 2 tablets (50 mg total) by mouth every 12 (twelve) hours, Disp: 60 tablet, Rfl: 0    montelukast (SINGULAIR) 10 mg tablet, Take 10 mg by mouth daily, Disp: , Rfl:     warfarin (COUMADIN) 5 mg tablet, Take 1 and 1/2 tabs by mouth on Mondays and 2 tabs all other days of the week Or as directed by coumadin clinic, Disp: , Rfl:     amiodarone 200 mg tablet, Take 200 mg by mouth daily, Disp: , Rfl:     apixaban (ELIQUIS) 5 mg, Take 1 tablet (5 mg total) by mouth 2 (two) times a day, Disp: 60 tablet, Rfl: 0    Ascorbic Acid, Vitamin C, (VITAMIN C) 100 MG tablet, Take 100 mg by mouth daily, Disp: , Rfl:     Coenzyme Q10 200 MG capsule, Take 1 capsule by mouth daily, Disp: , Rfl:     Insulin Syringe-Needle U-100 31G X 15/64" 1 ML MISC, Inject under the skin 2 (two) times a day before meals RELION BRAND, Disp: 100 each, Rfl: 0    multivitamin-minerals (CENTRUM ADULTS) tablet, Take 1 tablet by mouth daily, Disp: 7 tablet, Rfl: 0    zinc sulfate (ZINCATE) 220 mg capsule, Take 1 capsule (220 mg total) by mouth daily for 5 doses, Disp: 5 capsule, Rfl: 0    Current Allergies     Allergies as of 06/21/2022 - Reviewed 06/21/2022   Allergen Reaction Noted    Penicillins Swelling 01/24/2021    Sulfa antibiotics Swelling 11/05/2017    Cardizem [diltiazem] Anxiety 04/07/2021            The following portions of the patient's history were reviewed and updated as appropriate: allergies, current medications, past family history, past medical history, past social history, past surgical history and problem list      Past Medical History:   Diagnosis Date    [facility]fib [facility])     [facility] [address] )     Diabetes mellitus ([facility] )     Hypertension        Past Surgical History:   Procedure Laterality Date    APPENDECTOMY      BREAST LUMPECTOMY Left     CHOLECYSTECTOMY      TOE AMPUTATION* Right 11/8/2017    Procedure: **Distal phalangectomy, NOT amputation** RIGHT THIRD TOE;  Surgeon: Jose Gallagher DPM;  Location: [facility];  Service: Podiatry    TOE OSTEOTOMY Right 2/15/2019    Procedure: 5th METATARSAL HEAD RESECTION;  Surgeon: Diana Victoria DPM;  Location: [facility];  Service: Podiatry       History reviewed  No pertinent family history  Medications have been verified  Objective   /80   Pulse 79   Temp 98 °F (36 7 °C)   Resp 19   Ht 5' 8" (1 727 m)   Wt 120 kg (264 lb)   SpO2 93%   BMI 40 14 kg/m²        Physical Exam     Physical Exam  Vitals and nursing note reviewed  Constitutional:       General: She is not in acute distress  Appearance: Normal appearance  She is not ill-appearing or toxic-appearing  HENT:      Right Ear: There is impacted cerumen  Left Ear: There is impacted cerumen  Nose: Congestion present  Mouth/Throat:      Mouth: Mucous membranes are moist       Comments: Post nasal drip noted  Cardiovascular:      Rate and Rhythm: Normal rate  Rhythm irregular  Pulses: Normal pulses  Heart sounds: Normal heart sounds  No murmur heard  No friction rub  No gallop  Pulmonary:      Effort: Pulmonary effort is normal  No respiratory distress  Breath sounds: Normal breath sounds  No stridor  No wheezing, rhonchi or rales     Musculoskeletal:      Cervical back: No tenderness  Lymphadenopathy:      Cervical: No cervical adenopathy  Skin:     General: Skin is warm and dry  Capillary Refill: Capillary refill takes less than 2 seconds  Neurological:      General: No focal deficit present  Mental Status: She is alert and oriented to person, place, and time  Psychiatric:         Mood and Affect: Mood normal          Behavior: Behavior normal          Thought Content:  Thought content normal          Judgment: Judgment normal

## 2022-06-23 ENCOUNTER — OFFICE VISIT (OUTPATIENT)
Dept: URGENT CARE | Facility: CLINIC | Age: 67
End: 2022-06-23
Payer: MEDICARE

## 2022-06-23 VITALS
TEMPERATURE: 98 F | DIASTOLIC BLOOD PRESSURE: 91 MMHG | RESPIRATION RATE: 20 BRPM | SYSTOLIC BLOOD PRESSURE: 156 MMHG | OXYGEN SATURATION: 95 % | HEART RATE: 97 BPM

## 2022-06-23 DIAGNOSIS — J01.10 ACUTE FRONTAL SINUSITIS, RECURRENCE NOT SPECIFIED: Primary | ICD-10-CM

## 2022-06-23 DIAGNOSIS — H10.503 BLEPHAROCONJUNCTIVITIS OF BOTH EYES, UNSPECIFIED BLEPHAROCONJUNCTIVITIS TYPE: ICD-10-CM

## 2022-06-23 DIAGNOSIS — J01.10 ACUTE FRONTAL SINUSITIS, RECURRENCE NOT SPECIFIED: ICD-10-CM

## 2022-06-23 DIAGNOSIS — R91.1 LUNG NODULE: ICD-10-CM

## 2022-06-23 PROCEDURE — G0463 HOSPITAL OUTPT CLINIC VISIT: HCPCS | Performed by: PHYSICIAN ASSISTANT

## 2022-06-23 PROCEDURE — 99214 OFFICE O/P EST MOD 30 MIN: CPT | Performed by: PHYSICIAN ASSISTANT

## 2022-06-23 RX ORDER — AMOXICILLIN AND CLAVULANATE POTASSIUM 875; 125 MG/1; MG/1
1 TABLET, FILM COATED ORAL EVERY 12 HOURS SCHEDULED
Qty: 14 TABLET | Refills: 0 | Status: SHIPPED | OUTPATIENT
Start: 2022-06-23 | End: 2022-06-30

## 2022-06-23 RX ORDER — CIPROFLOXACIN HYDROCHLORIDE 3.5 MG/ML
SOLUTION/ DROPS TOPICAL
Qty: 5 ML | Refills: 0 | Status: SHIPPED | OUTPATIENT
Start: 2022-06-23

## 2022-06-23 RX ORDER — TOBRAMYCIN 3 MG/ML
1 SOLUTION/ DROPS OPHTHALMIC
Qty: 5 ML | Refills: 0 | Status: SHIPPED | OUTPATIENT
Start: 2022-06-23 | End: 2022-06-23

## 2022-06-23 NOTE — PROGRESS NOTES
[facility]        NAME: Rowan Aragon is a 79 y o  female  : 1955    MRN: [id number]  DATE: 2022  TIME: 3:37 PM    Assessment and Plan   Acute frontal sinusitis, recurrence not specified [J01 10]  1  Acute frontal sinusitis, recurrence not specified  amoxicillin-clavulanate (AUGMENTIN) 875-125 mg per tablet    DISCONTINUED: tobramycin (TOBREX) 0 3 % SOLN   2  Blepharoconjunctivitis of both eyes, unspecified blepharoconjunctivitis type     3  Lung nodule           Patient Instructions   Patient Instructions     Pulmonary Nodules   AMBULATORY CARE:   Pulmonary nodules  are areas of abnormal tissue in your lungs  You may not have any symptoms, or you may have chest tightness, a cough, chest pain, or shortness of breath  Nodules are usually found with an x-ray or CT scan  Most nodules are not cancerous  However, it is still important for you to return for follow-up testing to monitor your condition  Call 911 for any of the following:   · You have severe shortness of breath or trouble breathing  · Your lips or nails look blue or pale  Seek care immediately if:   · You cough up blood  · You suddenly feel lightheaded or are short of breath  · You have chest pain when you take a deep breath or cough  · You cannot think clearly  Contact your healthcare provider if:   · Your symptoms do not improve  · You have new symptoms  · You have questions or concerns about your condition or care  Do not smoke:  Nicotine and other chemicals in cigarettes and cigars can cause lung damage or cancer  Stay away from others who smoke  Ask your healthcare provider for information if you or someone close to you currently smokes and needs help to quit  E-cigarettes or smokeless tobacco still contain nicotine  Talk to your healthcare provider before you use these products     Follow up with your healthcare provider:  Your healthcare provider will refer you to a pulmonologist  Your pulmonologist will monitor your nodules for any change or growth  Nodules that grow quickly may require a biopsy to check for cancer  You may need to be monitored for 1 to 3 years  Write down your questions so you remember to ask them during your visits  © Copyright [employer] 2022 Information is for End User's use only and may not be sold, redistributed or otherwise used for commercial purposes  All illustrations and images included in CareNotes® are the copyrighted property of [employer]  or Nahid Moyer   The above information is an  only  It is not intended as medical advice for individual conditions or treatments  Talk to your doctor, nurse or pharmacist before following any medical regimen to see if it is safe and effective for you  Conjunctivitis   WHAT YOU NEED TO KNOW:   Conjunctivitis, or pink eye, is inflammation of your conjunctiva  The conjunctiva is a thin tissue that covers the front of your eye and the back of your eyelids  The conjunctiva helps protect your eye and keep it moist  Conjunctivitis may be caused by bacteria, allergies, or a virus  If your conjunctivitis is caused by bacteria, it may get better on its own in about 7 days  Viral conjunctivitis can last up to 3 weeks  DISCHARGE INSTRUCTIONS:   Return to the emergency department if:   · You have worsening eye pain  · The swelling in your eye gets worse, even after treatment  · Your vision suddenly becomes worse or you cannot see at all  Contact your healthcare provider if:   · You develop a fever and ear pain  · You have tiny bumps or spots of blood on your eye  · You have questions or concerns about your condition or care  Manage your symptoms:   · Apply a cool compress  Wet a washcloth with cold water and place it on your eye  This will help decrease itching and irritation  · Do not wear contact lenses  They can irritate your eye   Throw away the pair you are using and ask when you can wear them again  Use a new pair of lenses when your healthcare provider says it is okay  · Avoid irritants  Stay away from smoke filled areas  Shield your eyes from wind and sun  · Flush your eye  You may need to flush your eye with saline to help decrease your symptoms  Ask for more information on how to flush your eye  Medicines:  Treatment depends on what is causing your conjunctivitis  You may be given any of the following:  · Allergy medicine  helps decrease itchy, red, swollen eyes caused by allergies  It may be given as a pill, eye drops, or nasal spray  · Antibiotics  may be needed if your conjunctivitis is caused by bacteria  This medicine may be given as a pill, eye drops, or eye ointment  · Take your medicine as directed  Contact your healthcare provider if you think your medicine is not helping or if you have side effects  Tell him or her if you are allergic to any medicine  Keep a list of the medicines, vitamins, and herbs you take  Include the amounts, and when and why you take them  Bring the list or the pill bottles to follow-up visits  Carry your medicine list with you in case of an emergency  Prevent the spread of conjunctivitis:   · Wash your hands with soap and water often  Wash your hands before and after you touch your eyes  Also wash your hands before you prepare or eat food and after you use the bathroom or change a diaper  · Avoid allergens  Try to avoid the things that cause your allergies, such as pets, dust, or grass  · Avoid contact with others  Do not share towels or washcloths  Try to stay away from others as much as possible  Ask when you can return to work or school  · Throw away eye makeup  The bacteria that caused your conjunctivitis can stay in eye makeup  Throw away mascara and other eye makeup      © Copyright [employer] 2022 Information is for End User's use only and may not be sold, redistributed or otherwise used for commercial purposes  All illustrations and images included in CareNotes® are the copyrighted property of [employer]  or Nahid Moyer   The above information is an  only  It is not intended as medical advice for individual conditions or treatments  Talk to your doctor, nurse or pharmacist before following any medical regimen to see if it is safe and effective for you  Sinusitis   WHAT YOU NEED TO KNOW:   Sinusitis is inflammation or infection of your sinuses  Sinusitis is most often caused by a virus  Acute sinusitis may last up to 12 weeks  Chronic sinusitis lasts longer than 12 weeks  Recurrent sinusitis means you have 4 or more infections in 1 year  DISCHARGE INSTRUCTIONS:   Return to the emergency department if:   · You have trouble breathing or wheezing that is getting worse  · You have a stiff neck, a fever, or a bad headache  · You cannot open your eye  · Your eyeball bulges out or you cannot move your eye  · You are more sleepy than normal, or you notice changes in your ability to think, move, or talk  · You have swelling of your forehead or scalp  Call your doctor if:   · You have vision changes, such as double vision  · Your eye and eyelid are red, swollen, and painful  · Your symptoms do not improve or go away after 10 days  · You have nausea and are vomiting  · Your nose is bleeding  · You have questions or concerns about your condition or care  Medicines: Your symptoms may go away on their own  Your healthcare provider may recommend watchful waiting for up to 10 days before starting antibiotics  You may need any of the following:  · Acetaminophen  decreases pain and fever  It is available without a doctor's order  Ask how much to take and how often to take it  Follow directions   Read the labels of all other medicines you are using to see if they also contain acetaminophen, or ask your doctor or pharmacist  Acetaminophen can cause liver damage if not taken correctly  Do not use more than 4 grams (4,000 milligrams) total of acetaminophen in one day  · NSAIDs , such as ibuprofen, help decrease swelling, pain, and fever  This medicine is available with or without a doctor's order  NSAIDs can cause stomach bleeding or kidney problems in certain people  If you take blood thinner medicine, always ask your healthcare provider if NSAIDs are safe for you  Always read the medicine label and follow directions  · Nasal steroid sprays  may help decrease inflammation in your nose and sinuses  · Decongestants  help reduce swelling and drain mucus in the nose and sinuses  They may help you breathe easier  · Antihistamines  help dry mucus in the nose and relieve sneezing  · Antibiotics  help treat or prevent a bacterial infection  · Take your medicine as directed  Contact your healthcare provider if you think your medicine is not helping or if you have side effects  Tell him or her if you are allergic to any medicine  Keep a list of the medicines, vitamins, and herbs you take  Include the amounts, and when and why you take them  Bring the list or the pill bottles to follow-up visits  Carry your medicine list with you in case of an emergency  Self-care:   · Rinse your sinuses as directed  Use a sinus rinse device to rinse your nasal passages with a saline (salt water) solution or distilled water  Do not use tap water  This will help thin the mucus in your nose and rinse away pollen and dirt  It will also help reduce swelling so you can breathe normally  · Use a humidifier  to increase air moisture in your home  This may make it easier for you to breathe and help decrease your cough  · Sleep with your head elevated  Place an extra pillow under your head before you go to sleep to help your sinuses drain  · Drink liquids as directed  Ask your healthcare provider how much liquid to drink each day and which liquids are best for you   Liquids will thin the mucus in your nose and help it drain  Avoid drinks that contain alcohol or caffeine  · Do not smoke, and avoid secondhand smoke  Nicotine and other chemicals in cigarettes and cigars can make your symptoms worse  Ask your healthcare provider for information if you currently smoke and need help to quit  E-cigarettes or smokeless tobacco still contain nicotine  Talk to your healthcare provider before you use these products  Prevent the spread of germs:   · Wash your hands often with soap and water  Wash your hands after you use the bathroom, change a child's diaper, or sneeze  Wash your hands before you prepare or eat food  · Stay away from people who are sick  Some germs spread easily and quickly through contact  Follow up with your doctor as directed: You may be referred to an ear, nose, and throat specialist  Write down your questions so you remember to ask them during your visits  © [employer] 2022 Information is for End User's use only and may not be sold, redistributed or otherwise used for commercial purposes  All illustrations and images included in CareNotes® are the copyrighted property of [employer] , Inc  or [address] [place] [address]  The above information is an  only  It is not intended as medical advice for individual conditions or treatments  Talk to your doctor, nurse or pharmacist before following any medical regimen to see if it is safe and effective for you  I suggest follow up with PCP regarding abnormal CTA from 2021          Follow up with PCP in 3-5 days  Proceed to  ER if symptoms worsen  Chief Complaint     Chief Complaint   Patient presents with    Cold Like Symptoms     Eye drainage, sore throat, cough and congestion            History of Present Illness       The patient is a 26-year-old female with a past medical history significant for AFib, CHF, obesity, and type 2 diabetes who presents to the clinic complaining of sinus pressure, bloody nose, nasal congestion, and drainage from her eyes for 6 days  The patient was initially seen in the clinic on Tuesday and was treated with Tessalon Perles  She states that this did not seem to help with her symptoms  She states that her cough is currently resolved but now she has drainage from her eyes and still has a sinus pressure and green discharge  Of note, the patient was admitted for COVID pneumonia and CHF exacerbation in January 2021 and had a CTA of the chest at that time showing abnormal lung nodules  It was recommended that she have a repeat CT scan in 6 months but she was lost to follow-up  I did urge her to follow-up with her primary care doctor for these lung nodules  She currently does not smoke and denies any previous history of tobacco use  She currently denies hemoptysis, fevers, chills, chest pain, or shortness of breath  Review of Systems   Review of Systems   HENT: Positive for congestion, rhinorrhea, sinus pressure, sinus pain and sore throat  Eyes: Positive for pain, discharge and redness  Negative for itching  Respiratory: Positive for cough  Negative for chest tightness, shortness of breath and wheezing  Gastrointestinal: Negative for diarrhea  Neurological: Negative for dizziness and headaches           Current Medications       Current Outpatient Medications:     acetaminophen (TYLENOL) 325 mg tablet, Take 2 tablets by mouth every 6 (six) hours as needed for mild pain, headaches or fever, Disp: 30 tablet, Rfl: 0    amoxicillin-clavulanate (AUGMENTIN) 875-125 mg per tablet, Take 1 tablet by mouth every 12 (twelve) hours for 7 days, Disp: 14 tablet, Rfl: 0    Ascorbic Acid, Vitamin C, (VITAMIN C) 100 MG tablet, Take 100 mg by mouth daily, Disp: , Rfl:     benzonatate (TESSALON) 200 MG capsule, Take 1 capsule (200 mg total) by mouth 3 (three) times a day as needed for cough, Disp: 20 capsule, Rfl: 0    cholecalciferol (VITAMIN D3) 1,000 units tablet, Take 2,000 Units by mouth daily, Disp: , Rfl:     Coenzyme Q10 200 MG capsule, Take 1 capsule by mouth daily, Disp: , Rfl:     cyanocobalamin (VITAMIN B-12) 500 MCG tablet, Take 2 tablets by mouth daily, Disp: , Rfl:     furosemide (LASIX) 40 mg tablet, Take 40 mg by mouth, Disp: , Rfl:     insulin NPH-insulin regular (NOVOLIN 70/30 RELION) 100 units/mL subcutaneous injection, Inject 15 Units under the skin 2 (two) times a day before meals, Disp: 10 mL, Rfl: 0    insulin regular (HumuLIN R,NovoLIN R) 100 units/mL injection, once Sliding scale, Disp: , Rfl:     Insulin Syringe-Needle U-100 31G X 15/64" 1 ML MISC, Inject under the skin 2 (two) times a day before meals RELION BRAND, Disp: 100 each, Rfl: 0    metoprolol tartrate (LOPRESSOR) 25 mg tablet, Take 2 tablets (50 mg total) by mouth every 12 (twelve) hours, Disp: 60 tablet, Rfl: 0    montelukast (SINGULAIR) 10 mg tablet, Take 10 mg by mouth daily, Disp: , Rfl:     warfarin (COUMADIN) 5 mg tablet, Take 1 and 1/2 tabs by mouth on Mondays and 2 tabs all other days of the week Or as directed by coumadin clinic, Disp: , Rfl:     ciprofloxacin (CILOXAN) 0 3 % ophthalmic solution, 1 gtt q 2 hours while the patient was awake for 2 days in both eyes, then 1 drop q 4 hours while awake on days 3-7, Disp: 5 mL, Rfl: 0    zinc sulfate (ZINCATE) 220 mg capsule, Take 1 capsule (220 mg total) by mouth daily for 5 doses, Disp: 5 capsule, Rfl: 0    Current Allergies     Allergies as of 06/23/2022 - Reviewed 06/23/2022   Allergen Reaction Noted    Penicillins Swelling 01/24/2021    Sulfa antibiotics Swelling 11/05/2017    Cardizem [diltiazem] Anxiety 04/07/2021            The following portions of the patient's history were reviewed and updated as appropriate: allergies, current medications, past family history, past medical history, past social history, past surgical history and problem list      Past Medical History:   Diagnosis Date    A-fib ([facility] )     CHF (congestive heart failure) ([facility] )     Diabetes mellitus ([facility] )     Hypertension            History reviewed  No pertinent family history  Medications have been verified  Objective   /91   Pulse 97   Temp 98 °F (36 7 °C)   Resp 20   SpO2 95%        Physical Exam     Physical Exam  Constitutional:       Appearance: She is well-developed  She is obese  She is not diaphoretic  HENT:      Head: Normocephalic  Nose:      Right Sinus: Frontal sinus tenderness present  Left Sinus: Frontal sinus tenderness present  Comments: Green discharge with sinus tenderness noted  Eyes:      General: Lids are normal          Right eye: No discharge  Left eye: No discharge  Extraocular Movements:      Right eye: Normal extraocular motion and no nystagmus  Left eye: Normal extraocular motion  Conjunctiva/sclera:      Right eye: No exudate or hemorrhage  Left eye: No hemorrhage  Pupils: Pupils are equal, round, and reactive to light  Comments: Erythema of the right conjunctiva with associated edema and ecchymosis  Neck:      Thyroid: No thyromegaly  Cardiovascular:      Rate and Rhythm: Normal rate  Rhythm irregular  Heart sounds: No murmur heard  Pulmonary:      Effort: Pulmonary effort is normal  No respiratory distress  Breath sounds: Decreased air movement present  Decreased breath sounds and rhonchi present  No wheezing or rales  Chest:      Chest wall: No tenderness  Abdominal:      General: There is no distension  Palpations: Abdomen is soft  Tenderness: There is no abdominal tenderness  There is no guarding or rebound  Musculoskeletal:         General: Normal range of motion  Cervical back: Normal range of motion  Lymphadenopathy:      Cervical: No cervical adenopathy  Skin:     General: Skin is warm  Neurological:      Mental Status: She is alert and oriented to person, place, and time

## 2022-06-23 NOTE — PATIENT INSTRUCTIONS
Pulmonary Nodules   AMBULATORY CARE:   Pulmonary nodules  are areas of abnormal tissue in your lungs  You may not have any symptoms, or you may have chest tightness, a cough, chest pain, or shortness of breath  Nodules are usually found with an x-ray or CT scan  Most nodules are not cancerous  However, it is still important for you to return for follow-up testing to monitor your condition  Call 911 for any of the following: You have severe shortness of breath or trouble breathing  Your lips or nails look blue or pale  Seek care immediately if:   You cough up blood  You suddenly feel lightheaded or are short of breath  You have chest pain when you take a deep breath or cough  You cannot think clearly  Contact your healthcare provider if:   Your symptoms do not improve  You have new symptoms  You have questions or concerns about your condition or care  Do not smoke:  Nicotine and other chemicals in cigarettes and cigars can cause lung damage or cancer  Stay away from others who smoke  Ask your healthcare provider for information if you or someone close to you currently smokes and needs help to quit  E-cigarettes or smokeless tobacco still contain nicotine  Talk to your healthcare provider before you use these products  Follow up with your healthcare provider:  Your healthcare provider will refer you to a pulmonologist  Your pulmonologist will monitor your nodules for any change or growth  Nodules that grow quickly may require a biopsy to check for cancer  You may need to be monitored for 1 to 3 years  Write down your questions so you remember to ask them during your visits  © "[employer] 2022 Information is for End User's use only and may not be sold, redistributed or otherwise used for commercial purposes   All illustrations and images included in CareNotes® are the copyrighted property of [employer]  or Nahid Moyer   The above information is an educational aid only  It is not intended as medical advice for individual conditions or treatments  Talk to your doctor, nurse or pharmacist before following any medical regimen to see if it is safe and effective for you  Conjunctivitis   WHAT YOU NEED TO KNOW:   Conjunctivitis, or pink eye, is inflammation of your conjunctiva  The conjunctiva is a thin tissue that covers the front of your eye and the back of your eyelids  The conjunctiva helps protect your eye and keep it moist  Conjunctivitis may be caused by bacteria, allergies, or a virus  If your conjunctivitis is caused by bacteria, it may get better on its own in about 7 days  Viral conjunctivitis can last up to 3 weeks  DISCHARGE INSTRUCTIONS:   Return to the emergency department if:   You have worsening eye pain  The swelling in your eye gets worse, even after treatment  Your vision suddenly becomes worse or you cannot see at all  Contact your healthcare provider if:   You develop a fever and ear pain  You have tiny bumps or spots of blood on your eye  You have questions or concerns about your condition or care  Manage your symptoms:   Apply a cool compress  Wet a washcloth with cold water and place it on your eye  This will help decrease itching and irritation  Do not wear contact lenses  They can irritate your eye  Throw away the pair you are using and ask when you can wear them again  Use a new pair of lenses when your healthcare provider says it is okay  Avoid irritants  Stay away from smoke filled areas  Shield your eyes from wind and sun  Flush your eye  You may need to flush your eye with saline to help decrease your symptoms  Ask for more information on how to flush your eye  Medicines:  Treatment depends on what is causing your conjunctivitis  You may be given any of the following: Allergy medicine  helps decrease itchy, red, swollen eyes caused by allergies   It may be given as a pill, eye drops, or nasal spray     Antibiotics  may be needed if your conjunctivitis is caused by bacteria  This medicine may be given as a pill, eye drops, or eye ointment  Take your medicine as directed  Contact your healthcare provider if you think your medicine is not helping or if you have side effects  Tell him or her if you are allergic to any medicine  Keep a list of the medicines, vitamins, and herbs you take  Include the amounts, and when and why you take them  Bring the list or the pill bottles to follow-up visits  Carry your medicine list with you in case of an emergency  Prevent the spread of conjunctivitis:   Wash your hands with soap and water often  Wash your hands before and after you touch your eyes  Also wash your hands before you prepare or eat food and after you use the bathroom or change a diaper  Avoid allergens  Try to avoid the things that cause your allergies, such as pets, dust, or grass  Avoid contact with others  Do not share towels or washcloths  Try to stay away from others as much as possible  Ask when you can return to work or school  Throw away eye makeup  The bacteria that caused your conjunctivitis can stay in eye makeup  Throw away mascara and other eye makeup  © Copyright [employer] 2022 Information is for End User's use only and may not be sold, redistributed or otherwise used for commercial purposes  All illustrations and images included in CareNotes® are the copyrighted property of [employer] Inc  or [facility] [place] Moyer   The above information is an  only  It is not intended as medical advice for individual conditions or treatments  Talk to your doctor, nurse or pharmacist before following any medical regimen to see if it is safe and effective for you  Sinusitis   WHAT YOU NEED TO KNOW:   Sinusitis is inflammation or infection of your sinuses  Sinusitis is most often caused by a virus  Acute sinusitis may last up to 12 weeks   Chronic sinusitis lasts longer than 12 weeks  Recurrent sinusitis means you have 4 or more infections in 1 year  DISCHARGE INSTRUCTIONS:   Return to the emergency department if:   You have trouble breathing or wheezing that is getting worse  You have a stiff neck, a fever, or a bad headache  You cannot open your eye  Your eyeball bulges out or you cannot move your eye  You are more sleepy than normal, or you notice changes in your ability to think, move, or talk  You have swelling of your forehead or scalp  Call your doctor if:   You have vision changes, such as double vision  Your eye and eyelid are red, swollen, and painful  Your symptoms do not improve or go away after 10 days  You have nausea and are vomiting  Your nose is bleeding  You have questions or concerns about your condition or care  Medicines: Your symptoms may go away on their own  Your healthcare provider may recommend watchful waiting for up to 10 days before starting antibiotics  You may need any of the following:  Acetaminophen  decreases pain and fever  It is available without a doctor's order  Ask how much to take and how often to take it  Follow directions  Read the labels of all other medicines you are using to see if they also contain acetaminophen, or ask your doctor or pharmacist  Acetaminophen can cause liver damage if not taken correctly  Do not use more than 4 grams (4,000 milligrams) total of acetaminophen in one day  NSAIDs , such as ibuprofen, help decrease swelling, pain, and fever  This medicine is available with or without a doctor's order  NSAIDs can cause stomach bleeding or kidney problems in certain people  If you take blood thinner medicine, always ask your healthcare provider if NSAIDs are safe for you  Always read the medicine label and follow directions  Nasal steroid sprays  may help decrease inflammation in your nose and sinuses      Decongestants  help reduce swelling and drain mucus in the nose and sinuses  They may help you breathe easier  Antihistamines  help dry mucus in the nose and relieve sneezing  Antibiotics  help treat or prevent a bacterial infection  Take your medicine as directed  Contact your healthcare provider if you think your medicine is not helping or if you have side effects  Tell him or her if you are allergic to any medicine  Keep a list of the medicines, vitamins, and herbs you take  Include the amounts, and when and why you take them  Bring the list or the pill bottles to follow-up visits  Carry your medicine list with you in case of an emergency  Self-care:   Rinse your sinuses as directed  Use a sinus rinse device to rinse your nasal passages with a saline (salt water) solution or distilled water  Do not use tap water  This will help thin the mucus in your nose and rinse away pollen and dirt  It will also help reduce swelling so you can breathe normally  Use a humidifier  to increase air moisture in your home  This may make it easier for you to breathe and help decrease your cough  Sleep with your head elevated  Place an extra pillow under your head before you go to sleep to help your sinuses drain  Drink liquids as directed  Ask your healthcare provider how much liquid to drink each day and which liquids are best for you  Liquids will thin the mucus in your nose and help it drain  Avoid drinks that contain alcohol or caffeine  Do not smoke, and avoid secondhand smoke  Nicotine and other chemicals in cigarettes and cigars can make your symptoms worse  Ask your healthcare provider for information if you currently smoke and need help to quit  E-cigarettes or smokeless tobacco still contain nicotine  Talk to your healthcare provider before you use these products  Prevent the spread of germs:   Wash your hands often with soap and water  Wash your hands after you use the bathroom, change a child's diaper, or sneeze   Wash your hands before you prepare or eat food  Stay away from people who are sick  Some germs spread easily and quickly through contact  Follow up with your doctor as directed: You may be referred to an ear, nose, and throat specialist  Write down your questions so you remember to ask them during your visits  © Copyright [employer] 2022 Information is for End User's use only and may not be sold, redistributed or otherwise used for commercial purposes  All illustrations and images included in CareNotes® are the copyrighted property of [employer]  or Nahid Moyer   The above information is an  only  It is not intended as medical advice for individual conditions or treatments  Talk to your doctor, nurse or pharmacist before following any medical regimen to see if it is safe and effective for you      I suggest follow up with PCP regarding abnormal CTA from 2021

## 2022-07-04 ENCOUNTER — HOSPITAL ENCOUNTER (EMERGENCY)
Facility: HOSPITAL | Age: 67
Discharge: HOME | End: 2022-07-04
Attending: EMERGENCY MEDICINE
Payer: MEDICARE

## 2022-07-04 VITALS
OXYGEN SATURATION: 96 % | SYSTOLIC BLOOD PRESSURE: 136 MMHG | DIASTOLIC BLOOD PRESSURE: 100 MMHG | TEMPERATURE: 98.3 F | RESPIRATION RATE: 18 BRPM | HEART RATE: 81 BPM

## 2022-07-04 DIAGNOSIS — U07.1 COVID-19: Primary | ICD-10-CM

## 2022-07-04 DIAGNOSIS — L03.90 CELLULITIS: ICD-10-CM

## 2022-07-04 DIAGNOSIS — B34.9 ACUTE VIRAL SYNDROME: ICD-10-CM

## 2022-07-04 LAB
BACTERIA UR QL AUTO: NORMAL /HPF
BILIRUB UR QL STRIP: NEGATIVE
CLARITY UR: ABNORMAL
COLOR UR: YELLOW
FLUAV RNA RESP QL NAA+PROBE: NEGATIVE
FLUBV RNA RESP QL NAA+PROBE: NEGATIVE
GLUCOSE UR STRIP-MCNC: NEGATIVE MG/DL
HGB UR QL STRIP.AUTO: ABNORMAL
KETONES UR STRIP-MCNC: NEGATIVE MG/DL
LEUKOCYTE ESTERASE UR QL STRIP: NEGATIVE
NITRITE UR QL STRIP: NEGATIVE
NON-SQ EPI CELLS URNS QL MICRO: NORMAL /HPF
PH UR STRIP.AUTO: 6.5 [PH]
PROT UR STRIP-MCNC: ABNORMAL MG/DL
RBC #/AREA URNS AUTO: NORMAL /HPF
RSV RNA RESP QL NAA+PROBE: NEGATIVE
SARS-COV-2 RNA RESP QL NAA+PROBE: POSITIVE
SP GR UR STRIP.AUTO: 1.02 (ref 1–1.03)
UROBILINOGEN UR QL STRIP.AUTO: 0.2 E.U./DL
WBC #/AREA URNS AUTO: NORMAL /HPF

## 2022-07-04 PROCEDURE — 1124F ACP DISCUSS-NO DSCNMKR DOCD: CPT | Performed by: EMERGENCY MEDICINE

## 2022-07-04 PROCEDURE — 99283 EMERGENCY DEPT VISIT LOW MDM: CPT

## 2022-07-04 PROCEDURE — 81001 URINALYSIS AUTO W/SCOPE: CPT | Performed by: EMERGENCY MEDICINE

## 2022-07-04 PROCEDURE — 0241U HB NFCT DS VIR RESP RNA 4 TRGT: CPT

## 2022-07-04 PROCEDURE — 99284 EMERGENCY DEPT VISIT MOD MDM: CPT | Performed by: EMERGENCY MEDICINE

## 2022-07-04 RX ORDER — CEPHALEXIN 500 MG/1
500 CAPSULE ORAL EVERY 12 HOURS SCHEDULED
Qty: 14 CAPSULE | Refills: 0 | Status: SHIPPED | OUTPATIENT
Start: 2022-07-04 | End: 2022-07-11

## 2022-07-04 NOTE — Clinical Note
Sandra Shoemaker was seen and treated in our emergency department on 7/4/2022  Diagnosis:     Lanre Stephen    She may return on this date: If you have any questions or concerns, please don't hesitate to call        Kraig Mcfadden MD    ______________________________           _______________          _______________  Hospital Representative                              Date                                Time

## 2022-07-04 NOTE — Clinical Note
Sheila Rollins was seen and treated in our emergency department on 7/4/2022  Diagnosis:     Elsi Ko  may return to school on return date  She may return on this date: If you have any questions or concerns, please don't hesitate to call        Malathi Jin MD    ______________________________           _______________          _______________  Hospital Representative                              Date                                Time

## 2022-07-04 NOTE — Clinical Note
Ki Gunner was seen and treated in our emergency department on 7/4/2022  Diagnosis:     Tom Rivera  may return to school on return date  She may return on this date: If you have any questions or concerns, please don't hesitate to call        Jefry Mauricio MD    ______________________________           _______________          _______________  Hospital Representative                              Date                                Time

## 2022-07-04 NOTE — Clinical Note
Natasha Mlies was seen and treated in our emergency department on 7/4/2022  Diagnosis:     Padmaja Lopez  may return to work on return date  She may return on this date:     Quarantine at home for 5 days till July 9th  If you have any questions or concerns, please don't hesitate to call        Andrew Lock MD    ______________________________           _______________          _______________  Hospital Representative                              Date                                Time

## 2022-07-04 NOTE — ED PROVIDER NOTES
History  Chief Complaint   Patient presents with    Fever - 9 weeks to 74 years     Pt states she has had left sided sinus pressure and fever x4 days  Pt denies cp,sob,n/d/d       History provided by:  Patient   used: No      Patient is 57-year-old female who presents here due to right sided sinus pressure and fever  Patient reports that she had left-sided sinus infection the last week of June  Went to urgent care on June 23 was prescribed Augmentin  Finished her medication on Thursday June 30   She woke up yesterday morning with fever, took Tylenol which controlled her fever  Patient Took Tylenol today 5:00 a m  Patient reports right sinus pain/pressure  Right ear pain, 3/10 in severity, dull  She additionally states that she has right-sided sore throat and dry intermittent cough  Patient denies headache, nausea, acute shortness of breath, nausea, vomiting, chest pain, acute wheezing, purulent discharge from nose, eye burning/or itching, UTI symptoms, constipation, diarrhea, COVID contact  Of note, she is not COVID vaccinated  She had COVID 1 have years ago mild symptoms  Prior to Admission Medications   Prescriptions Last Dose Informant Patient Reported? Taking?    Ascorbic Acid, Vitamin C, (VITAMIN C) 100 MG tablet   Yes No   Sig: Take 100 mg by mouth daily   Coenzyme Q10 200 MG capsule   Yes No   Sig: Take 1 capsule by mouth daily   Insulin Syringe-Needle U-100 31G X 15/64" 1 ML MISC   No No   Sig: Inject under the skin 2 (two) times a day before meals RELION BRAND   acetaminophen (TYLENOL) 325 mg tablet   No No   Sig: Take 2 tablets by mouth every 6 (six) hours as needed for mild pain, headaches or fever   benzonatate (TESSALON) 200 MG capsule   No No   Sig: Take 1 capsule (200 mg total) by mouth 3 (three) times a day as needed for cough   cholecalciferol (VITAMIN D3) 1,000 units tablet  Self Yes No   Sig: Take 2,000 Units by mouth daily   ciprofloxacin (CILOXAN) 0 3 % ophthalmic solution   No No   Si gtt q 2 hours while the patient was awake for 2 days in both eyes, then 1 drop q 4 hours while awake on days 3-7   cyanocobalamin (VITAMIN B-12) 500 MCG tablet   Yes No   Sig: Take 2 tablets by mouth daily   furosemide (LASIX) 40 mg tablet   Yes No   Sig: Take 40 mg by mouth   insulin NPH-insulin regular (NOVOLIN 70/30 RELION) 100 units/mL subcutaneous injection   No No   Sig: Inject 15 Units under the skin 2 (two) times a day before meals   insulin regular (HumuLIN R,NovoLIN R) 100 units/mL injection   Yes No   Sig: once Sliding scale   metoprolol tartrate (LOPRESSOR) 25 mg tablet   No No   Sig: Take 2 tablets (50 mg total) by mouth every 12 (twelve) hours   montelukast (SINGULAIR) 10 mg tablet   Yes No   Sig: Take 10 mg by mouth daily   warfarin (COUMADIN) 5 mg tablet   Yes No   Sig: Take 1 and 1/2 tabs by mouth on  and 2 tabs all other days of the week Or as directed by coumadin clinic   zinc sulfate (ZINCATE) 220 mg capsule   No No   Sig: Take 1 capsule (220 mg total) by mouth daily for 5 doses      Facility-Administered Medications: None       Past Medical History:   Diagnosis Date    [facility]fib ([facility] )     CHF (congestive heart failure) ([facility] )     Diabetes mellitus ([facility] )     Hypertension        Past Surgical History:   Procedure Laterality Date    APPENDECTOMY      BREAST LUMPECTOMY Left     CHOLECYSTECTOMY      TOE AMPUTATION Right 2017    Procedure: Distal phalangectomy, NOT amputationRIGHT THIRD TOE;  Surgeon: Elvia Wyman DPM;  Location: [facility];  Service: Podiatry    TOE OSTEOTOMY Right 02/15/2019    Procedure: 5th METATARSAL HEAD RESECTION;  Surgeon: Kostas Gaviria DPM;  Location: [facility];  Service: Podiatry       History reviewed  No pertinent family history  I have reviewed and agree with the history as documented      E-Cigarette/Vaping    E-Cigarette Use Never User      E-Cigarette/Vaping Substances    Nicotine No     THC No     CBD No  Other No     Unknown No      Social History     Tobacco Use    Smoking status: Never Smoker    Smokeless tobacco: Never Used   Vaping Use    Vaping Use: Never used   Substance Use Topics    Alcohol use: Never     Alcohol/week: 0 0 standard drinks     Comment: 0    Drug use: No        Review of Systems   Constitutional: Positive for fever  Negative for activity change, appetite change and fatigue  HENT: Positive for ear pain, rhinorrhea, sinus pressure and sore throat ( Right side only)  Negative for congestion, drooling, ear discharge, facial swelling, mouth sores, postnasal drip, tinnitus, trouble swallowing and voice change  Respiratory: Positive for cough ( Chronic dry)  Negative for choking, chest tightness, shortness of breath, wheezing and stridor  Cardiovascular: Negative for chest pain, palpitations and leg swelling  Gastrointestinal: Negative for abdominal distention, blood in stool, constipation, diarrhea, nausea and vomiting  Genitourinary: Negative for difficulty urinating, dysuria and hematuria  Skin: Negative for color change  Neurological: Negative for dizziness, light-headedness and headaches  All other systems reviewed and are negative  Physical Exam  ED Triage Vitals [07/04/22 1054]   Temperature Pulse Respirations Blood Pressure SpO2   98 3 °F (36 8 °C) 83 18 136/100 95 %      Temp Source Heart Rate Source Patient Position - Orthostatic VS BP Location FiO2 (%)   Temporal -- Sitting Left arm --      Pain Score       --             Orthostatic Vital Signs  Vitals:    07/04/22 1054 07/04/22 1200   BP: 136/100    Pulse: 83 81   Patient Position - Orthostatic VS: Sitting        Physical Exam  Vitals reviewed  Constitutional:       Appearance: She is obese  HENT:      Right Ear: There is impacted cerumen  Mouth/Throat:      Mouth: Mucous membranes are moist       Pharynx: Posterior oropharyngeal erythema present  No oropharyngeal exudate     Eyes: Conjunctiva/sclera: Conjunctivae normal    Cardiovascular:      Rate and Rhythm: Normal rate and regular rhythm  Pulses: Normal pulses  Heart sounds: Normal heart sounds  Pulmonary:      Effort: Pulmonary effort is normal       Breath sounds: Normal breath sounds  No wheezing, rhonchi or rales  Chest:      Chest wall: No tenderness  Musculoskeletal:      Right foot: Normal range of motion  Swelling present  No deformity or tenderness  Left foot: Normal range of motion  Swelling present  No tenderness or bony tenderness  Legs:    Skin:     General: Skin is warm  Capillary Refill: Capillary refill takes less than 2 seconds  Findings: Abrasion ( On right leg, lateral   No drainage, no bleeding) present  Neurological:      Mental Status: She is alert  Psychiatric:         Mood and Affect: Mood normal          ED Medications  Medications - No data to display    Diagnostic Studies  Results Reviewed     Procedure Component Value Units Date/Time    COVID/FLU/RSV [[id number]]  (Abnormal) Collected: 07/04/22 1129    Lab Status: Final result Specimen: Nares from Nose Updated: 07/04/22 1212     SARS-CoV-2 Positive     INFLUENZA A PCR Negative     INFLUENZA B PCR Negative     RSV PCR Negative    Narrative:      FOR PEDIATRIC PATIENTS - copy/paste COVID Guidelines URL to browser: [email or web address]    SARS-CoV-2 assay is a Nucleic Acid Amplification assay intended for the  qualitative detection of nucleic acid from SARS-CoV-2 in nasopharyngeal  swabs  Results are for the presumptive identification of SARS-CoV-2 RNA  Positive results are indicative of infection with SARS-CoV-2, the virus  causing COVID-19, but do not rule out bacterial infection or co-infection  with other viruses  Laboratories within the [place] and its  territories are required to report all positive results to the appropriate  public health authorities  Negative results do not preclude SARS-CoV-2  infection and should not be used as the sole basis for treatment or other  patient management decisions  Negative results must be combined with  clinical observations, patient history, and epidemiological information  This test has not been FDA cleared or approved  This test has been authorized by FDA under an Emergency Use Authorization  (EUA)  This test is only authorized for the duration of time the  declaration that circumstances exist justifying the authorization of the  emergency use of an in vitro diagnostic tests for detection of SARS-CoV-2  virus and/or diagnosis of COVID-19 infection under section 564(b)(1) of  the Act, 21 U  S C  356EBL-1(M)(4), unless the authorization is terminated  or revoked sooner  The test has been validated but independent review by FDA  and CLIA is pending  Test performed using [employer] GeneXpert: This RT-PCR assay targets N2,  a region unique to SARS-CoV-2  A conserved region in the E-gene was chosen  for pan-Sarbecovirus detection which includes SARS-CoV-2      Urine Microscopic [[id number]]  (Normal) Collected: 07/04/22 1147    Lab Status: Final result Specimen: Urine, Clean Catch Updated: 07/04/22 1159     RBC, UA 0-5 /hpf      WBC, UA 0-1 /hpf      Epithelial Cells Occasional /hpf      Bacteria, UA Occasional /hpf     UA w Reflex to Microscopic w Reflex to Culture [[id number]]  (Abnormal) Collected: 07/04/22 1147    Lab Status: Final result Specimen: Urine, Clean Catch Updated: 07/04/22 1152     Color, UA Yellow     Clarity, UA Cloudy     Specific [place], UA 1 020     pH, UA 6 5     Leukocytes, UA Negative     Nitrite, UA Negative     Protein,  (2+) mg/dl      Glucose, UA Negative mg/dl      Ketones, UA Negative mg/dl      Urobilinogen, UA 0 2 E U /dl      Bilirubin, UA Negative     Occult Blood, UA Trace-Intact                 No orders to display         Procedures  Procedures      ED Course SBIRT 20yo+    Flowsheet Row Most Recent Value   SBIRT (25 yo +)    In order to provide better care to our patients, we are screening all of our patients for alcohol and drug use  Would it be okay to ask you these screening questions? No Filed at: 07/04/2022 1106                [facility]  Number of Diagnoses or Management Options     Amount and/or Complexity of Data Reviewed  Clinical lab tests: ordered and reviewed  Discussion of test results with the performing providers: yes        Disposition  Final diagnoses:   Acute viral syndrome   COVID-19   Cellulitis     Time reflects when diagnosis was documented in both MDM as applicable and the Disposition within this note     Time User Action Codes Description Comment    7/4/2022 12:27 PM Vernel Butler Add [B34 9] Acute viral syndrome     7/4/2022 12:27 PM Vernel Butler Add [U07 1] COVID-19     7/4/2022 12:27 PM Vernel Butler Modify [B34 9] Acute viral syndrome     7/4/2022 12:27 PM Vernel Butler Modify [U07 1] COVID-19     7/4/2022 12:28 PM Vernel Butler Add [K40 32] Cellulitis       ED Disposition     ED Disposition   Discharge    Condition   Stable    Date/Time   Mon Jul 4, 2022 12:27 PM    Comment   Reymundo Chen discharge to home/self care                 Follow-up Information     Follow up With Specialties Details Why [address] [facility], DO Internal Medicine In 1 week Follow-up with PCP regarding positive COVID test result [address], DPM Podiatry, Wound Care In 1 week  [address], [place] [address]  [phone]            Discharge Medication List as of 7/4/2022 12:37 PM      START taking these medications    Details   cephalexin (KEFLEX) 500 mg capsule Take 1 capsule (500 mg total) by mouth every 12 (twelve) hours for 7 days, Starting Mon 7/4/2022, Until Mon 7/11/2022, Normal         CONTINUE these medications which have NOT CHANGED    Details acetaminophen (TYLENOL) 325 mg tablet Take 2 tablets by mouth every 6 (six) hours as needed for mild pain, headaches or fever, Starting Thu 11/9/2017, No Print      Ascorbic Acid, Vitamin C, (VITAMIN C) 100 MG tablet Take 100 mg by mouth daily, Historical Med      benzonatate (TESSALON) 200 MG capsule Take 1 capsule (200 mg total) by mouth 3 (three) times a day as needed for cough, Starting Tue 6/21/2022, Normal      cholecalciferol (VITAMIN D3) 1,000 units tablet Take 2,000 Units by mouth daily, Historical Med      ciprofloxacin (CILOXAN) 0 3 % ophthalmic solution 1 gtt q 2 hours while the patient was awake for 2 days in both eyes, then 1 drop q 4 hours while awake on days 3-7, Normal      Coenzyme Q10 200 MG capsule Take 1 capsule by mouth daily, Historical Med      cyanocobalamin (VITAMIN B-12) 500 MCG tablet Take 2 tablets by mouth daily, Starting Wed 3/30/2022, Historical Med      furosemide (LASIX) 40 mg tablet Take 40 mg by mouth, Starting Tue 12/1/2020, Historical Med      insulin NPH-insulin regular (NOVOLIN 70/30 RELION) 100 units/mL subcutaneous injection Inject 15 Units under the skin 2 (two) times a day before meals, Starting Thu 11/9/2017, Print      insulin regular (HumuLIN R,NovoLIN R) 100 units/mL injection once Sliding scale, Historical Med      Insulin Syringe-Needle U-100 31G X 15/64" 1 ML MISC Inject under the skin 2 (two) times a day before meals RELION BRAND, Starting Thu 11/9/2017, Print      metoprolol tartrate (LOPRESSOR) 25 mg tablet Take 2 tablets (50 mg total) by mouth every 12 (twelve) hours, Starting Tue 1/26/2021, Normal      montelukast (SINGULAIR) 10 mg tablet Take 10 mg by mouth daily, Starting Thu 3/18/2021, Historical Med      warfarin (COUMADIN) 5 mg tablet Take 1 and 1/2 tabs by mouth on Mondays and 2 tabs all other days of the week Or as directed by coumadin clinic, Historical Med      zinc sulfate (ZINCATE) 220 mg capsule Take 1 capsule (220 mg total) by mouth daily for 5 doses, Starting Wed 1/27/2021, Until Mon 2/1/2021, Normal           No discharge procedures on file  PDMP Review       Value Time User    PDMP Reviewed  Yes 1/24/2021  5:50 PM Hang Lynn PA-C           ED Provider  Attending physically available and evaluated Karina Stover  I managed the patient along with the ED Attending      Electronically Signed by         Kathrene Riedel, MD  07/04/22 2187

## 2022-07-04 NOTE — Clinical Note
Sheila Rollins was seen and treated in our emergency department on 7/4/2022  Diagnosis:     Elsi Ko  may return to work on return date  She may return on this date:     Quarantine 5 days at home till Friday July 8  After that needs to use mask additionally 5 days      If you have any questions or concerns, please don't hesitate to call        Malathi Jin MD    ______________________________           _______________          _______________  Hospital Representative                              Date                                Time

## 2022-07-04 NOTE — Clinical Note
Michell Shelton was seen and treated in our emergency department on 7/4/2022  Diagnosis:     Orin Bosworth  may return to work on return date  She may return on this date:     Quarantine 5 days at home till Friday July 8  After that needs to use mask additionally 5 days      If you have any questions or concerns, please don't hesitate to call        Yonis Aldana MD    ______________________________           _______________          _______________  Hospital Representative                              Date                                Time

## 2022-07-04 NOTE — ED ATTENDING ATTESTATION
7/4/2022  IDottie DO, saw and evaluated the patient  I have discussed the patient with the resident/non-physician practitioner and agree with the resident's/non-physician practitioner's findings, Plan of Care, and MDM as documented in the resident's/non-physician practitioner's note, except where noted  All available labs and Radiology studies were reviewed  I was present for key portions of any procedure(s) performed by the resident/non-physician practitioner and I was immediately available to provide assistance  At this point I agree with the current assessment done in the Emergency Department  I have conducted an independent evaluation of this patient a history and physical is as follows:    ED Course     Emergency Department Note- Amarjit Pond 79 y o  female MRN: [id number]    Unit/Bed#: AF91 Encounter: [id number]    Amarjit Pond is a 79 y o  female who presents with   Chief Complaint   Patient presents with    Fever - 9 weeks to 74 years     Pt states she has had left sided sinus pressure and fever x4 days  Pt denies cp,sob,n/d/d         History of Present Illness   HPI:  Amarjit Pond is a 79 y o  female who presents with fever  Symtpms began two days ago  Pt was treated with Augmentin for a left sided sinusitis and now states she feels the right sinus Is infected  + Cough, + chills  No recent travel  Not immunnocompromised  Pt is on Coumadin for afib  ROS is otherwise unremarkable      Historical Information   Past Medical History:   Diagnosis Date    A[facility]fib [facility])     [facility]     Diabetes mellitus [facility] )     Hypertension      Past Surgical History:   Procedure Laterality Date    APPENDECTOMY      BREAST LUMPECTOMY Left     CHOLECYSTECTOMY      TOE AMPUTATION Right 11/08/2017    Procedure: Distal phalangectomy, NOT amputationRIGHT THIRD TOE;  Surgeon: Coby Weber DPM;  Location: [facility];  Service: Podiatry    TOE OSTEOTOMY Right 02/15/2019    Procedure: 5th METATARSAL HEAD RESECTION;  Surgeon: John Patel DPM;  Location: [facility];  Service: Podiatry     Social History   Social History     Substance and Sexual Activity   Alcohol Use Never    Alcohol/week: 0 0 standard drinks    Comment: 0     Social History     Substance and Sexual Activity   Drug Use No     Social History     Tobacco Use   Smoking Status Never Smoker   Smokeless Tobacco Never Used       Family History:   Meds/Allergies     Allergies   Allergen Reactions    Penicillins Swelling     Penicillin K    Sulfa Antibiotics Swelling    Cardizem [Diltiazem] Anxiety       Objective   First Vitals:   Blood Pressure: 136/100 (22 1054)  Pulse: 83 (22 1054)  Temperature: 98 3 °F (36 8 °C) (22 1054)  Temp Source: Temporal (22 1054)  Respirations: 18 (22 1054)  SpO2: 95 % (22 1054)    Current Vitals:   Blood Pressure: 136/100 (22 1054)  Pulse: 81 (22 1200)  Temperature: 98 3 °F (36 8 °C) (22 1054)  Temp Source: Temporal (22 1054)  Respirations: 18 (22 1054)  SpO2: 96 % (22 1200)    No intake or output data in the 24 hours ending 22 1612    Invasive Devices  Report    None                       Medical Decision Makin  Labs reviewed  COVID +  Will also treat with Keflex for lower extremity cellulitis  F/u PCP       Recent Results (from the past 36 hour(s))   COVID/FLU/RSV    Collection Time: 22 11:29 AM    Specimen: Nose; Nares   Result Value Ref Range    SARS-CoV-2 Positive (A) Negative    INFLUENZA A PCR Negative Negative    INFLUENZA B PCR Negative Negative    RSV PCR Negative Negative   UA w Reflex to Microscopic w Reflex to Culture    Collection Time: 22 11:47 AM    Specimen: Urine, Clean Catch   Result Value Ref Range    Color, UA Yellow     Clarity, UA Cloudy     Specific [place], UA 1 020 1 003 - 1 030    pH, UA 6 5 4 5, 5 0, 5 5, 6 0, 6 5, 7 0, 7 5, 8 0 Leukocytes, UA Negative Negative    Nitrite, UA Negative Negative    Protein,  (2+) (A) Negative mg/dl    Glucose, UA Negative Negative mg/dl    Ketones, UA Negative Negative mg/dl    Urobilinogen, UA 0 2 0 2, 1 0 E U /dl E U /dl    Bilirubin, UA Negative Negative    Occult Blood, UA Trace-Intact (A) Negative   Urine Microscopic    Collection Time: 07/04/22 11:47 AM   Result Value Ref Range    RBC, UA 0-5 None Seen, 0-1, 1-2, 2-4, 0-5 /hpf    WBC, UA 0-1 None Seen, 0-1, 1-2, 0-5, 2-4 /hpf    Epithelial Cells Occasional None Seen, Occasional /hpf    Bacteria, UA Occasional None Seen, Occasional /hpf     No orders to display         Portions of the record may have been created with voice recognition software  Occasional wrong word or "sound a like" substitutions may have occurred due to the inherent limitations of voice recognition software          Critical Care Time  Procedures

## 2022-08-26 ENCOUNTER — OFFICE VISIT (OUTPATIENT)
Dept: URGENT CARE | Facility: CLINIC | Age: 67
End: 2022-08-26
Payer: MEDICARE

## 2022-08-26 ENCOUNTER — APPOINTMENT (OUTPATIENT)
Dept: RADIOLOGY | Facility: CLINIC | Age: 67
End: 2022-08-26
Payer: MEDICARE

## 2022-08-26 ENCOUNTER — APPOINTMENT (OUTPATIENT)
Dept: URGENT CARE | Facility: CLINIC | Age: 67
End: 2022-08-26
Payer: MEDICARE

## 2022-08-26 VITALS
SYSTOLIC BLOOD PRESSURE: 136 MMHG | TEMPERATURE: 99 F | RESPIRATION RATE: 18 BRPM | DIASTOLIC BLOOD PRESSURE: 80 MMHG | HEART RATE: 64 BPM | OXYGEN SATURATION: 96 %

## 2022-08-26 DIAGNOSIS — M25.561 ACUTE PAIN OF RIGHT KNEE: ICD-10-CM

## 2022-08-26 DIAGNOSIS — M17.11 OSTEOARTHRITIS OF RIGHT KNEE, UNSPECIFIED OSTEOARTHRITIS TYPE: Primary | ICD-10-CM

## 2022-08-26 PROCEDURE — 73564 X-RAY EXAM KNEE 4 OR MORE: CPT

## 2022-08-26 PROCEDURE — G0463 HOSPITAL OUTPT CLINIC VISIT: HCPCS

## 2022-08-26 PROCEDURE — 99213 OFFICE O/P EST LOW 20 MIN: CPT

## 2022-08-26 RX ORDER — HYDROCODONE BITARTRATE AND ACETAMINOPHEN 10; 325 MG/1; MG/1
1 TABLET ORAL EVERY 6 HOURS PRN
Qty: 12 TABLET | Refills: 0 | Status: SHIPPED | OUTPATIENT
Start: 2022-08-26 | End: 2022-08-29

## 2022-08-26 NOTE — PROGRESS NOTES
[address] [facility]        NAME: Karolyn Pierce is a 79 y o  female  : 1955    MRN: [id number]  DATE: 2022  TIME: 11:49 AM    Assessment and Plan   Osteoarthritis of right knee, unspecified osteoarthritis type [M17 11]  1  Osteoarthritis of right knee, unspecified osteoarthritis type  XR knee 4+ vw right injury    Ambulatory Referral to Orthopedic Surgery    HYDROcodone-acetaminophen (Norco)  mg per tablet     xr- no acute osseous abnormality  Osteoarthritis noted  Given patient on coumadin and has been using all other methods of pain management without relief with severe flare up of arthritis pain will write for 3 days of norco  PDMP reviewed  Pt states she has not been on a narcotic for over 6 years, norco  is what helped her in the past  Pt to follow up with PCP and orthopedics  Patient Instructions   Xr- no acute findings, will follow with official radiology read  Continue to use the voltaren gel on the right knee four times a day  Take the norco up to 4 times a day as needed for severe pain  Avoid taking tylenol with this medication  Caution while taking this medication as it may lead to drowsiness  Avoid alcohol while taking this medication  Follow with orthopedics and family provider  If knee pain worsens proceed to ED  Follow up with PCP in 3-5 days  Proceed to  ER if symptoms worsen  Chief Complaint     Chief Complaint   Patient presents with    Knee Pain         History of Present Illness       Patient is a 79year old female who presents to the office today for right knee pain  Has chronic knee pain, states normally takes 2 tylenol and is good for the day but today the pain is worse  denies new injuries, falls  Denies any increased activity  Denies fevers  Does have some numbness and tingling in right leg  Review of Systems   Review of Systems   Constitutional: Negative for chills, fatigue and fever     Respiratory: Negative for cough, shortness of breath and wheezing  Cardiovascular: Negative for chest pain and palpitations  Musculoskeletal: Positive for arthralgias, gait problem and joint swelling  Negative for myalgias  All other systems reviewed and are negative          Current Medications       Current Outpatient Medications:     acetaminophen (TYLENOL) 325 mg tablet, Take 2 tablets by mouth every 6 (six) hours as needed for mild pain, headaches or fever, Disp: 30 tablet, Rfl: 0    Ascorbic Acid, Vitamin C, (VITAMIN C) 100 MG tablet, Take 100 mg by mouth daily, Disp: , Rfl:     cholecalciferol (VITAMIN D3) 1,000 units tablet, Take 2,000 Units by mouth daily, Disp: , Rfl:     Coenzyme Q10 200 MG capsule, Take 1 capsule by mouth daily, Disp: , Rfl:     cyanocobalamin (VITAMIN B-12) 500 MCG tablet, Take 2 tablets by mouth daily, Disp: , Rfl:     Diclofenac Sodium (VOLTAREN) 1 %, Apply 2 g topically 4 (four) times a day, Disp: , Rfl:     furosemide (LASIX) 40 mg tablet, Take 40 mg by mouth, Disp: , Rfl:     HYDROcodone-acetaminophen (Norco)  mg per tablet, Take 1 tablet by mouth every 6 (six) hours as needed for severe pain for up to 3 days Max Daily Amount: 4 tablets, Disp: 12 tablet, Rfl: 0    insulin regular (HumuLIN R,NovoLIN R) 100 units/mL injection, once Sliding scale, Disp: , Rfl:     Insulin Syringe-Needle U-100 31G X 15/64" 1 ML MISC, Inject under the skin 2 (two) times a day before meals RELION BRAND, Disp: 100 each, Rfl: 0    metoprolol tartrate (LOPRESSOR) 25 mg tablet, Take 2 tablets (50 mg total) by mouth every 12 (twelve) hours, Disp: 60 tablet, Rfl: 0    montelukast (SINGULAIR) 10 mg tablet, Take 10 mg by mouth daily, Disp: , Rfl:     warfarin (COUMADIN) 5 mg tablet, Take 1 and 1/2 tabs by mouth on Mondays and 2 tabs all other days of the week Or as directed by coumadin clinic, Disp: , Rfl:     benzonatate (TESSALON) 200 MG capsule, Take 1 capsule (200 mg total) by mouth 3 (three) times a day as needed for cough, Disp: 20 capsule, Rfl: 0    ciprofloxacin (CILOXAN) 0 3 % ophthalmic solution, 1 gtt q 2 hours while the patient was awake for 2 days in both eyes, then 1 drop q 4 hours while awake on days 3-7, Disp: 5 mL, Rfl: 0    insulin NPH-insulin regular (NOVOLIN 70/30 RELION) 100 units/mL subcutaneous injection, Inject 15 Units under the skin 2 (two) times a day before meals, Disp: 10 mL, Rfl: 0    zinc sulfate (ZINCATE) 220 mg capsule, Take 1 capsule (220 mg total) by mouth daily for 5 doses, Disp: 5 capsule, Rfl: 0    Current Allergies     Allergies as of 08/26/2022 - Reviewed 08/26/2022   Allergen Reaction Noted    Penicillins Swelling 01/24/2021    Sulfa antibiotics Swelling 11/05/2017    Cardizem [diltiazem] Anxiety 04/07/2021            The following portions of the patient's history were reviewed and updated as appropriate: allergies, current medications, past family history, past medical history, past social history, past surgical history and problem list      Past Medical History:   Diagnosis Date    A-fib [facility])     CHF [facility]congestive heart failure) ([facility] [address] )     Diabetes mellitus ([facility] [address] )     Hypertension        Past Surgical History:   Procedure Laterality Date    APPENDECTOMY      BREAST LUMPECTOMY Left     CHOLECYSTECTOMY      TOE AMPUTATION Right 11/08/2017    Procedure: Distal phalangectomy, NOT amputationRIGHT THIRD TOE;  Surgeon: Coby Weber DPM;  Location: [facility];  Service: Podiatry    TOE OSTEOTOMY Right 02/15/2019    Procedure: 5th METATARSAL HEAD RESECTION;  Surgeon: Stephanie Loredo DPM;  Location: [facility];  Service: Podiatry       History reviewed  No pertinent family history  Medications have been verified  Objective   /80   Pulse 64   Temp 99 °F (37 2 °C)   Resp 18   SpO2 96%        Physical Exam     Physical Exam  Vitals and nursing note reviewed  Constitutional:       General: She is not in acute distress  Appearance: Normal appearance   She is normal weight  She is not ill-appearing or toxic-appearing  Cardiovascular:      Rate and Rhythm: Normal rate and regular rhythm  Pulses: Normal pulses  Heart sounds: Normal heart sounds  No murmur heard  No friction rub  No gallop  Pulmonary:      Effort: Pulmonary effort is normal  No respiratory distress  Breath sounds: Normal breath sounds  No stridor  No wheezing, rhonchi or rales  Chest:      Chest wall: No tenderness  Musculoskeletal:         General: Swelling and tenderness present  No deformity or signs of injury  Right knee: No lacerations or crepitus  Tenderness present  Normal alignment  Right lower leg: No edema  Left lower leg: No edema  Legs:       Comments: Pain elicited with standing and weight bearing  Unable to perform full evalution of right knee given pt tolerance of examination   Lymphadenopathy:      Cervical: No cervical adenopathy  Skin:     General: Skin is warm and dry  Capillary Refill: Capillary refill takes less than 2 seconds  Neurological:      General: No focal deficit present  Mental Status: She is alert and oriented to person, place, and time

## 2022-08-26 NOTE — PATIENT INSTRUCTIONS
Xr- no acute findings, will follow with official radiology read  Continue to use the voltaren gel on the right knee four times a day  Take the norco up to 4 times a day as needed for severe pain  Avoid taking tylenol with this medication  Caution while taking this medication as it may lead to drowsiness  Avoid alcohol while taking this medication  Follow with orthopedics and family provider  If knee pain worsens proceed to ED

## 2022-10-12 PROBLEM — J12.82 PNEUMONIA DUE TO COVID-19 VIRUS: Status: RESOLVED | Noted: 2021-01-24 | Resolved: 2022-10-12

## 2022-10-12 PROBLEM — U07.1 PNEUMONIA DUE TO COVID-19 VIRUS: Status: RESOLVED | Noted: 2021-01-24 | Resolved: 2022-10-12
